# Patient Record
Sex: MALE | Race: BLACK OR AFRICAN AMERICAN | NOT HISPANIC OR LATINO | URBAN - METROPOLITAN AREA
[De-identification: names, ages, dates, MRNs, and addresses within clinical notes are randomized per-mention and may not be internally consistent; named-entity substitution may affect disease eponyms.]

---

## 2019-06-05 LAB — HBA1C MFR BLD HPLC: 5.1 %

## 2019-06-18 ENCOUNTER — TELEPHONE (OUTPATIENT)
Dept: NEPHROLOGY | Facility: CLINIC | Age: 39
End: 2019-06-18

## 2019-06-26 ENCOUNTER — CONSULT (OUTPATIENT)
Dept: NEPHROLOGY | Facility: CLINIC | Age: 39
End: 2019-06-26
Payer: COMMERCIAL

## 2019-06-26 VITALS
BODY MASS INDEX: 39.17 KG/M2 | HEART RATE: 74 BPM | DIASTOLIC BLOOD PRESSURE: 98 MMHG | WEIGHT: 315 LBS | HEIGHT: 75 IN | SYSTOLIC BLOOD PRESSURE: 136 MMHG

## 2019-06-26 DIAGNOSIS — I10 HYPERTENSION, UNSPECIFIED TYPE: ICD-10-CM

## 2019-06-26 DIAGNOSIS — I10 ESSENTIAL HYPERTENSION: ICD-10-CM

## 2019-06-26 DIAGNOSIS — R80.1 PERSISTENT PROTEINURIA: ICD-10-CM

## 2019-06-26 DIAGNOSIS — N18.30 STAGE 3 CHRONIC KIDNEY DISEASE (HCC): ICD-10-CM

## 2019-06-26 DIAGNOSIS — N17.9 AKI (ACUTE KIDNEY INJURY) (HCC): Primary | ICD-10-CM

## 2019-06-26 LAB
SL AMB  POCT GLUCOSE, UA: NEGATIVE
SL AMB LEUKOCYTE ESTERASE,UA: NEGATIVE
SL AMB POCT BILIRUBIN,UA: NEGATIVE
SL AMB POCT BLOOD,UA: NEGATIVE
SL AMB POCT CLARITY,UA: CLEAR
SL AMB POCT COLOR,UA: YELLOW
SL AMB POCT KETONES,UA: NEGATIVE
SL AMB POCT NITRITE,UA: NEGATIVE
SL AMB POCT PH,UA: 6
SL AMB POCT SPECIFIC GRAVITY,UA: 1.01
SL AMB POCT URINE PROTEIN: ABNORMAL
SL AMB POCT UROBILINOGEN: 0.2

## 2019-06-26 PROCEDURE — 99244 OFF/OP CNSLTJ NEW/EST MOD 40: CPT | Performed by: INTERNAL MEDICINE

## 2019-06-26 PROCEDURE — 81002 URINALYSIS NONAUTO W/O SCOPE: CPT | Performed by: INTERNAL MEDICINE

## 2019-06-26 RX ORDER — CHLORTHALIDONE 25 MG/1
12.5 TABLET ORAL DAILY
Qty: 30 TABLET | Refills: 5 | Status: SHIPPED | OUTPATIENT
Start: 2019-06-26 | End: 2021-06-22 | Stop reason: ALTCHOICE

## 2019-06-26 RX ORDER — AMLODIPINE BESYLATE 10 MG/1
10 TABLET ORAL DAILY
Refills: 3 | COMMUNITY
Start: 2019-06-04 | End: 2021-06-22 | Stop reason: SDUPTHER

## 2019-07-13 LAB
ALBUMIN SERPL-MCNC: 4.9 G/DL (ref 3.6–5.1)
ALBUMIN/GLOB SERPL: 1.6 (CALC) (ref 1–2.5)
ALP SERPL-CCNC: 83 U/L (ref 40–115)
ALT SERPL-CCNC: 31 U/L (ref 9–46)
APPEARANCE UR: CLEAR
AST SERPL-CCNC: 50 U/L (ref 10–40)
BACTERIA UR QL AUTO: ABNORMAL /HPF
BASOPHILS # BLD AUTO: 30 CELLS/UL (ref 0–200)
BASOPHILS NFR BLD AUTO: 0.4 %
BILIRUB SERPL-MCNC: 0.6 MG/DL (ref 0.2–1.2)
BILIRUB UR QL STRIP: NEGATIVE
BUN SERPL-MCNC: 16 MG/DL (ref 7–25)
BUN/CREAT SERPL: 11 (CALC) (ref 6–22)
CALCIUM SERPL-MCNC: 9.7 MG/DL (ref 8.6–10.3)
CHLORIDE SERPL-SCNC: 103 MMOL/L (ref 98–110)
CO2 SERPL-SCNC: 25 MMOL/L (ref 20–32)
COLOR UR: ABNORMAL
CREAT SERPL-MCNC: 1.43 MG/DL (ref 0.6–1.35)
EOSINOPHIL # BLD AUTO: 60 CELLS/UL (ref 15–500)
EOSINOPHIL NFR BLD AUTO: 0.8 %
ERYTHROCYTE [DISTWIDTH] IN BLOOD BY AUTOMATED COUNT: 11.7 % (ref 11–15)
GLOBULIN SER CALC-MCNC: 3.1 G/DL (CALC) (ref 1.9–3.7)
GLUCOSE SERPL-MCNC: 79 MG/DL (ref 65–99)
GLUCOSE UR QL STRIP: NEGATIVE
HCT VFR BLD AUTO: 40.1 % (ref 38.5–50)
HGB BLD-MCNC: 13.5 G/DL (ref 13.2–17.1)
HGB UR QL STRIP: NEGATIVE
HYALINE CASTS #/AREA URNS LPF: ABNORMAL /LPF
KETONES UR QL STRIP: ABNORMAL
LEUKOCYTE ESTERASE UR QL STRIP: NEGATIVE
LYMPHOCYTES # BLD AUTO: 1763 CELLS/UL (ref 850–3900)
LYMPHOCYTES NFR BLD AUTO: 23.5 %
MAGNESIUM SERPL-MCNC: 2.2 MG/DL (ref 1.5–2.5)
MCH RBC QN AUTO: 31.9 PG (ref 27–33)
MCHC RBC AUTO-ENTMCNC: 33.7 G/DL (ref 32–36)
MCV RBC AUTO: 94.8 FL (ref 80–100)
MONOCYTES # BLD AUTO: 578 CELLS/UL (ref 200–950)
MONOCYTES NFR BLD AUTO: 7.7 %
NEUTROPHILS # BLD AUTO: 5070 CELLS/UL (ref 1500–7800)
NEUTROPHILS NFR BLD AUTO: 67.6 %
NITRITE UR QL STRIP: NEGATIVE
PH UR STRIP: 6 [PH] (ref 5–8)
PHOSPHATE SERPL-MCNC: 3.3 MG/DL (ref 2.5–4.5)
PLATELET # BLD AUTO: 219 THOUSAND/UL (ref 140–400)
PMV BLD REES-ECKER: 11.4 FL (ref 7.5–12.5)
POTASSIUM SERPL-SCNC: 3.6 MMOL/L (ref 3.5–5.3)
PROT SERPL-MCNC: 8 G/DL (ref 6.1–8.1)
PROT UR QL STRIP: ABNORMAL
RBC # BLD AUTO: 4.23 MILLION/UL (ref 4.2–5.8)
RBC #/AREA URNS HPF: ABNORMAL /HPF
SERVICE CMNT-IMP: ABNORMAL
SL AMB EGFR AFRICAN AMERICAN: 71 ML/MIN/1.73M2
SL AMB EGFR NON AFRICAN AMERICAN: 62 ML/MIN/1.73M2
SODIUM SERPL-SCNC: 138 MMOL/L (ref 135–146)
SP GR UR STRIP: 1.02 (ref 1–1.03)
SQUAMOUS #/AREA URNS HPF: ABNORMAL /HPF
WBC # BLD AUTO: 7.5 THOUSAND/UL (ref 3.8–10.8)
WBC #/AREA URNS HPF: ABNORMAL /HPF

## 2019-07-15 ENCOUNTER — TELEPHONE (OUTPATIENT)
Dept: NEPHROLOGY | Facility: CLINIC | Age: 39
End: 2019-07-15

## 2019-07-15 NOTE — TELEPHONE ENCOUNTER
1st attempted to contact patient on 7/12  Left message for patient to schedule September follow up appointment with Dr Basilio Paz

## 2019-07-16 ENCOUNTER — TELEPHONE (OUTPATIENT)
Dept: NEPHROLOGY | Facility: CLINIC | Age: 39
End: 2019-07-16

## 2019-07-17 LAB
ALBUMIN/CREAT UR: 7 MCG/MG CREAT
ALDOST SERPL-MCNC: 6 NG/DL
CATECHOLS PLAS-MCNC: 691 PG/ML
CREAT UR-MCNC: 360 MG/DL (ref 20–320)
DOPAMINE 24H UR-MRATE: NORMAL PG/ML
EPINEPH PLAS-MCNC: 62 PG/ML
METANEPH FREE SERPL-MCNC: 49 PG/ML
METANEPHS SERPL-MCNC: 154 PG/ML
MICROALBUMIN UR-MCNC: 2.4 MG/DL
NOREPINEPH PLAS-MCNC: 629 PG/ML
NORMETANEPH FREE SERPL-MCNC: 105 PG/ML
RENIN PLAS-CCNC: 1.12 NG/ML/H (ref 0.25–5.82)

## 2021-06-13 ENCOUNTER — HOSPITAL ENCOUNTER (EMERGENCY)
Facility: HOSPITAL | Age: 41
Discharge: HOME | End: 2021-06-13
Attending: EMERGENCY MEDICINE | Admitting: EMERGENCY MEDICINE
Payer: COMMERCIAL

## 2021-06-13 VITALS
OXYGEN SATURATION: 100 % | TEMPERATURE: 98.1 F | HEART RATE: 111 BPM | HEIGHT: 75 IN | DIASTOLIC BLOOD PRESSURE: 113 MMHG | SYSTOLIC BLOOD PRESSURE: 174 MMHG | BODY MASS INDEX: 39.17 KG/M2 | RESPIRATION RATE: 20 BRPM | WEIGHT: 315 LBS

## 2021-06-13 DIAGNOSIS — L02.31 LEFT BUTTOCK ABSCESS: Primary | ICD-10-CM

## 2021-06-13 PROCEDURE — 99283 EMERGENCY DEPT VISIT LOW MDM: CPT

## 2021-06-13 PROCEDURE — 10061 I&D ABSCESS COMP/MULTIPLE: CPT | Performed by: PHYSICIAN ASSISTANT

## 2021-06-13 PROCEDURE — 99284 EMERGENCY DEPT VISIT MOD MDM: CPT | Performed by: PHYSICIAN ASSISTANT

## 2021-06-13 RX ORDER — BACITRACIN, NEOMYCIN, POLYMYXIN B 400; 3.5; 5 [USP'U]/G; MG/G; [USP'U]/G
1 OINTMENT TOPICAL ONCE
Status: COMPLETED | OUTPATIENT
Start: 2021-06-13 | End: 2021-06-13

## 2021-06-13 RX ORDER — LIDOCAINE HYDROCHLORIDE AND EPINEPHRINE 10; 10 MG/ML; UG/ML
10 INJECTION, SOLUTION INFILTRATION; PERINEURAL ONCE
Status: COMPLETED | OUTPATIENT
Start: 2021-06-13 | End: 2021-06-13

## 2021-06-13 RX ORDER — CLINDAMYCIN HYDROCHLORIDE 300 MG/1
300 CAPSULE ORAL EVERY 8 HOURS SCHEDULED
Qty: 21 CAPSULE | Refills: 0 | Status: SHIPPED | OUTPATIENT
Start: 2021-06-13 | End: 2021-06-20

## 2021-06-13 RX ORDER — OXYCODONE HYDROCHLORIDE AND ACETAMINOPHEN 5; 325 MG/1; MG/1
1 TABLET ORAL EVERY 6 HOURS PRN
Qty: 12 TABLET | Refills: 0 | Status: SHIPPED | OUTPATIENT
Start: 2021-06-13 | End: 2021-06-16

## 2021-06-13 RX ORDER — IBUPROFEN 600 MG/1
600 TABLET ORAL EVERY 6 HOURS PRN
Qty: 20 TABLET | Refills: 0 | Status: SHIPPED | OUTPATIENT
Start: 2021-06-13 | End: 2021-06-22 | Stop reason: ALTCHOICE

## 2021-06-13 RX ADMIN — LIDOCAINE HYDROCHLORIDE,EPINEPHRINE BITARTRATE 10 ML: 10; .01 INJECTION, SOLUTION INFILTRATION; PERINEURAL at 11:27

## 2021-06-13 RX ADMIN — BACITRACIN, NEOMYCIN, POLYMYXIN B 1 SMALL APPLICATION: 400; 3.5; 5 OINTMENT TOPICAL at 11:27

## 2021-06-13 NOTE — ED PROVIDER NOTES
History  Chief Complaint   Patient presents with    Abscess     "anal abscess" x 4 days, given hemorrhoid cream from urgent care, denies fevers, history of same 10 years ago  Patient with PMH: HTN , PSH: back surgery, knee surgery, shoulder arthroplasty, prior I&D left buttocks, presents to ED c/o 4 day history of gradual onset of painful enlarging mass to left perianal, buttock area  No fevers ,no NVD, no discharge, no erythema ; patient denies trying to open area or squeeze area at home  Prior to Admission Medications   Prescriptions Last Dose Informant Patient Reported? Taking? amLODIPine (NORVASC) 10 mg tablet 6/13/2021 at Unknown time  Yes Yes   Sig: Take 10 mg by mouth daily   chlorthalidone 25 mg tablet 6/13/2021 at Unknown time  No Yes   Sig: Take 0 5 tablets (12 5 mg total) by mouth daily   Patient taking differently: Take 25 mg by mouth daily       Facility-Administered Medications: None       Past Medical History:   Diagnosis Date    Hypertension        Past Surgical History:   Procedure Laterality Date    BACK SURGERY      FINGER SURGERY Right     KNEE SURGERY Right     SHOULDER ARTHROPLASTY Right        Family History   Problem Relation Age of Onset    Heart disease Mother     Hypertension Mother     No Known Problems Father      I have reviewed and agree with the history as documented  E-Cigarette/Vaping    E-Cigarette Use Never User      E-Cigarette/Vaping Substances     Social History     Tobacco Use    Smoking status: Current Every Day Smoker     Packs/day: 0 50     Types: Cigarettes    Smokeless tobacco: Never Used   Vaping Use    Vaping Use: Never used   Substance Use Topics    Alcohol use: Yes    Drug use: Yes     Types: Marijuana       Review of Systems   Constitutional: Negative for chills and fever  HENT: Negative for hearing loss and sore throat  Eyes: Negative for visual disturbance  Respiratory: Negative for shortness of breath      Cardiovascular: Negative for chest pain  Gastrointestinal: Negative for abdominal pain, nausea and vomiting  Genitourinary: Negative for dysuria, frequency and genital sores  Musculoskeletal: Positive for gait problem and myalgias  Negative for arthralgias  Skin: Negative for color change and pallor  Neurological: Negative for dizziness, weakness, light-headedness and headaches  Psychiatric/Behavioral: Negative for behavioral problems  All other systems reviewed and are negative  Physical Exam  Physical Exam  Vitals and nursing note reviewed  Constitutional:       General: He is in acute distress  Appearance: He is well-developed  He is obese  HENT:      Head: Normocephalic and atraumatic  Right Ear: External ear normal       Left Ear: External ear normal       Nose: Nose normal       Mouth/Throat:      Mouth: Mucous membranes are moist       Pharynx: Oropharynx is clear  Eyes:      Conjunctiva/sclera: Conjunctivae normal    Cardiovascular:      Rate and Rhythm: Normal rate  Pulmonary:      Effort: Pulmonary effort is normal  No respiratory distress  Abdominal:      General: Bowel sounds are normal       Palpations: Abdomen is soft  Genitourinary:         Comments: + raised, tender, fluctuant mass/abscess noted to left, upper, inner buttock, not involving the anus/rectum/perineum  Musculoskeletal:         General: Normal range of motion  Cervical back: Normal range of motion  Lymphadenopathy:      Cervical: No cervical adenopathy  Skin:     General: Skin is warm and dry  Capillary Refill: Capillary refill takes less than 2 seconds  Findings: Lesion present  Neurological:      General: No focal deficit present  Mental Status: He is alert     Psychiatric:         Behavior: Behavior normal          Vital Signs  ED Triage Vitals [06/13/21 1100]   Temperature Pulse Respirations Blood Pressure SpO2   98 1 °F (36 7 °C) (!) 111 20 (!) 174/113 100 %      Temp Source Heart Rate Source Patient Position - Orthostatic VS BP Location FiO2 (%)   Oral Monitor Sitting Left arm --      Pain Score       Worst Possible Pain           Vitals:    06/13/21 1100   BP: (!) 174/113   Pulse: (!) 111   Patient Position - Orthostatic VS: Sitting         Visual Acuity      ED Medications  Medications   lidocaine-epinephrine (XYLOCAINE/EPINEPHRINE) 1 %-1:100,000 injection 10 mL (10 mL Infiltration Given 6/13/21 1127)   neomycin-bacitracin-polymyxin b (NEOSPORIN) ointment 1 small application (1 small application Topical Given 6/13/21 1127)       Diagnostic Studies  Results Reviewed     None                 No orders to display              Procedures  Incision and drain    Date/Time: 6/13/2021 12:11 PM  Performed by: Cruz Verde PA-C  Authorized by: Cruz Verde PA-C   Universal Protocol:  Consent: Verbal consent obtained  Risks and benefits: risks, benefits and alternatives were discussed  Consent given by: patient  Time out: Immediately prior to procedure a "time out" was called to verify the correct patient, procedure, equipment, support staff and site/side marked as required  Patient understanding: patient states understanding of the procedure being performed  Patient identity confirmed: verbally with patient      Patient location:  ED  Location:     Type:  Abscess    Size:  3    Location: left buttock  Pre-procedure details:     Skin preparation:  Betadine  Anesthesia (see MAR for exact dosages): Anesthesia method:  Local infiltration    Local anesthetic:  Lidocaine 1% WITH epi  Procedure details:     Complexity:  Simple    Incision types:  Single straight    Scalpel blade:  11    Approach:  Open    Incision depth:  Subcutaneous    Wound management:  Probed and deloculated and irrigated with saline    Drainage:  Purulent and bloody    Drainage amount:   Moderate    Wound treatment:  Drain placed    Packing materials:  1/2 in iodoform gauze  Post-procedure details:     Patient tolerance of procedure: Tolerated well, no immediate complications             ED Course                                           MDM    Disposition  Final diagnoses:   Left buttock abscess     Time reflects when diagnosis was documented in both MDM as applicable and the Disposition within this note     Time User Action Codes Description Comment    6/13/2021 11:56 AM Rip Booth [L02 31] Left buttock abscess       ED Disposition     ED Disposition Condition Date/Time Comment    Discharge [place] Jun 13, 2021 11:55 AM Joesph Sacks discharge to home/self care  Follow-up Information     Follow up With Specialties Details Why Contact Info Additional [id number]  Emergency Department Emergency Medicine  For wound re-check/packing removal 24-48 hrs [address] [facility],[address] [facility] Emergency Department, [address], [address]          Discharge Medication List as of 6/13/2021 11:58 AM      START taking these medications    Details   clindamycin (CLEOCIN) 300 MG capsule Take 1 capsule (300 mg total) by mouth every 8 (eight) hours for 7 days, Starting Sun 6/13/2021, Until Sun 6/20/2021, Normal      ibuprofen (MOTRIN) 600 mg tablet Take 1 tablet (600 mg total) by mouth every 6 (six) hours as needed for mild pain, Starting Sun 6/13/2021, Normal      oxyCODONE-acetaminophen (PERCOCET) 5-325 mg per tablet Take 1 tablet by mouth every 6 (six) hours as needed for moderate pain for up to 3 daysMax Daily Amount: 4 tablets, Starting Sun 6/13/2021, Until Wed 6/16/2021 at 2359, Normal         CONTINUE these medications which have NOT CHANGED    Details   amLODIPine (NORVASC) 10 mg tablet Take 10 mg by mouth daily, Starting Tue 6/4/2019, Historical Med      chlorthalidone 25 mg tablet Take 0 5 tablets (12 5 mg total) by mouth daily, Starting Wed 6/26/2019, Normal           No discharge procedures on file      PDMP Review None          ED Provider  Electronically Signed by           Jimmy Horan PA-C  06/13/21 6194

## 2021-06-13 NOTE — DISCHARGE INSTRUCTIONS
Use Motrin every 6 hours for pain, if no improvement add percocet  Take all oral antibiotics until done

## 2021-06-16 ENCOUNTER — RA CDI HCC (OUTPATIENT)
Dept: OTHER | Facility: HOSPITAL | Age: 41
End: 2021-06-16

## 2021-06-16 NOTE — PROGRESS NOTES
[facility]  coding opportunities             Chart reviewed, (number of) suggestions sent to provider: 2            Number of suggestions actually used: 2         Patients insurance company: [employer] (Medicare Advantage and Commercial)     Visit status: Patient arrived for their scheduled appointment        [facility]  coding opportunities             Chart reviewed, (number of) suggestions sent to provider: 2                  Patients insurance company: [employer] (Medicare Advantage and Commercial)           1) N18 3: Stage 3 chronic kidney disease ([facility] ) - last assessed on 6/26/2019 Nephrology  2) E66 01: Morbid (severe) obesity due to excess calories ([facility] ) - please review if this is correct and assess and document if applicable

## 2021-06-22 ENCOUNTER — OFFICE VISIT (OUTPATIENT)
Dept: FAMILY MEDICINE CLINIC | Facility: CLINIC | Age: 41
End: 2021-06-22
Payer: COMMERCIAL

## 2021-06-22 ENCOUNTER — TELEPHONE (OUTPATIENT)
Dept: PSYCHIATRY | Facility: CLINIC | Age: 41
End: 2021-06-22

## 2021-06-22 VITALS
DIASTOLIC BLOOD PRESSURE: 100 MMHG | HEART RATE: 102 BPM | BODY MASS INDEX: 39.17 KG/M2 | WEIGHT: 315 LBS | HEIGHT: 75 IN | SYSTOLIC BLOOD PRESSURE: 168 MMHG | OXYGEN SATURATION: 96 % | RESPIRATION RATE: 16 BRPM

## 2021-06-22 DIAGNOSIS — F17.200 TOBACCO DEPENDENCE: ICD-10-CM

## 2021-06-22 DIAGNOSIS — F10.10 ALCOHOL ABUSE: ICD-10-CM

## 2021-06-22 DIAGNOSIS — Z00.00 ANNUAL PHYSICAL EXAM: Primary | ICD-10-CM

## 2021-06-22 DIAGNOSIS — R94.31 ABNORMAL EKG: ICD-10-CM

## 2021-06-22 DIAGNOSIS — F12.90 MARIJUANA USE: ICD-10-CM

## 2021-06-22 DIAGNOSIS — I10 ESSENTIAL HYPERTENSION: ICD-10-CM

## 2021-06-22 DIAGNOSIS — E66.01 MORBID OBESITY WITH BMI OF 40.0-44.9, ADULT (HCC): ICD-10-CM

## 2021-06-22 DIAGNOSIS — R00.0 TACHYCARDIA: ICD-10-CM

## 2021-06-22 DIAGNOSIS — N18.30 STAGE 3 CHRONIC KIDNEY DISEASE, UNSPECIFIED WHETHER STAGE 3A OR 3B CKD (HCC): ICD-10-CM

## 2021-06-22 DIAGNOSIS — Z13.1 SCREENING FOR DIABETES MELLITUS: ICD-10-CM

## 2021-06-22 DIAGNOSIS — Z13.6 SCREENING FOR CARDIOVASCULAR CONDITION: ICD-10-CM

## 2021-06-22 PROBLEM — N17.9 AKI (ACUTE KIDNEY INJURY) (HCC): Status: RESOLVED | Noted: 2019-06-26 | Resolved: 2021-06-22

## 2021-06-22 PROCEDURE — 99386 PREV VISIT NEW AGE 40-64: CPT | Performed by: FAMILY MEDICINE

## 2021-06-22 PROCEDURE — 4004F PT TOBACCO SCREEN RCVD TLK: CPT | Performed by: FAMILY MEDICINE

## 2021-06-22 PROCEDURE — 93000 ELECTROCARDIOGRAM COMPLETE: CPT | Performed by: FAMILY MEDICINE

## 2021-06-22 PROCEDURE — 99203 OFFICE O/P NEW LOW 30 MIN: CPT | Performed by: FAMILY MEDICINE

## 2021-06-22 PROCEDURE — 3725F SCREEN DEPRESSION PERFORMED: CPT | Performed by: FAMILY MEDICINE

## 2021-06-22 PROCEDURE — 3008F BODY MASS INDEX DOCD: CPT | Performed by: FAMILY MEDICINE

## 2021-06-22 RX ORDER — HYDROCHLOROTHIAZIDE 25 MG/1
TABLET ORAL
COMMUNITY
Start: 2021-05-22 | End: 2022-01-18 | Stop reason: SDUPTHER

## 2021-06-22 RX ORDER — METOPROLOL SUCCINATE 25 MG/1
25 TABLET, EXTENDED RELEASE ORAL DAILY
Qty: 90 TABLET | Refills: 0 | Status: SHIPPED | OUTPATIENT
Start: 2021-06-22 | End: 2021-09-16

## 2021-06-22 RX ORDER — AMLODIPINE BESYLATE 10 MG/1
10 TABLET ORAL DAILY
Qty: 90 TABLET | Refills: 1 | Status: SHIPPED | OUTPATIENT
Start: 2021-06-22 | End: 2022-01-18 | Stop reason: SDUPTHER

## 2021-06-22 NOTE — PROGRESS NOTES
[facility]    NAME: Zen Johnson  AGE: 36 y o  SEX: male  : 1980     DATE: 2021     Assessment and Plan:     Problem List Items Addressed This Visit        Cardiovascular and Mediastinum    Essential hypertension    Relevant Medications    hydrochlorothiazide (HYDRODIURIL) 25 mg tablet    amLODIPine (NORVASC) 10 mg tablet    metoprolol succinate (TOPROL-XL) 25 mg 24 hr tablet    Other Relevant Orders    CBC and Platelet    Microalbumin / creatinine urine ratio    Comprehensive metabolic panel    TSH, 3rd generation with Free T4 reflex    POCT ECG    Ambulatory referral to Cardiology       Genitourinary    Stage 3 chronic kidney disease (HCC)    Relevant Medications    hydrochlorothiazide (HYDRODIURIL) 25 mg tablet    Other Relevant Orders    Microalbumin / creatinine urine ratio    Ambulatory referral to Nephrology       Other    Screening for cardiovascular condition - Primary    Relevant Orders    Lipid panel    Microalbumin / creatinine urine ratio    Morbid obesity with BMI of 40 0-44 9, adult ([address] )    Relevant Orders    Ambulatory referral to Weight Management    Ambulatory referral to Bariatric Surgery    Tobacco dependence    Relevant Orders    Ambulatory referral to [facility]    Alcohol abuse    Relevant Orders    Ambulatory referral to Behavioral Health    Tachycardia    Relevant Orders    POCT ECG    Marijuana use    Abnormal EKG    Relevant Orders    Ambulatory referral to Cardiology       EKG showed nonspecific T-wave abnormality and normal sinus rhythm, he has uncontrolled hypertension will add beta-blocker and advised to see cardiologist for for the evaluation for his heart     he  Refused for tetanus vaccine    Immunizations and preventive care screenings were discussed with patient today  Appropriate education was printed on patient's after visit summary      Counseling:  Alcohol/drug use: discussed moderation in alcohol intake, the recommendations for healthy alcohol use, and avoidance of illicit drug use  Dental Health: discussed importance of regular tooth brushing, flossing, and dental visits  · Exercise: the importance of regular exercise/physical activity was discussed  Recommend exercise 3-5 times per week for at least 30 minutes  BMI Counseling: Body mass index is 41 87 kg/m²  The BMI is above normal  Nutrition recommendations include decreasing portion sizes, decreasing fast food intake, consuming healthier snacks, limiting drinks that contain sugar, moderation in carbohydrate intake and reducing intake of cholesterol  Exercise recommendations include vigorous physical activity 75 minutes/week and exercising 3-5 times per week  Tobacco Cessation Counseling: Tobacco cessation counseling was provided  The patient is sincerely urged to quit consumption of tobacco  He is not ready to quit tobacco  Medication options discussed  No follow-ups on file  Chief Complaint:     Chief Complaint   Patient presents with   [address] Annual Exam      History of Present Illness:     Adult Annual Physical   Patient here for a comprehensive physical exam  The patient reports problems -   [place] Simple He is a new patient, he smokes quarter pack per day cigarettes and his smokes marijuana, and he also use alcohol about half pack per day, and is willing to work on it,  He has chronic kidney disease and he has not seen the nephrologist in 2 years, he says he only takes hydrochlorothiazide and amlodipine and his blood pressure is high, he denies any palpitation or chest pain, he has no sleep problem  He is interested in weight management program   he had recent incision drainage and abscess drainage from his left buttock, he finished the clindamycin and he feels fine    Diet and Physical Activity  · Diet/Nutrition: poor diet  · Exercise: no formal exercise        Depression Screening  PHQ-9 Depression Screening    PHQ-9:   Frequency of the following problems over the past two weeks:      Little interest or pleasure in doing things: 0 - not at all  Feeling down, depressed, or hopeless: 0 - not at all  PHQ-2 Score: 0       General Health  · Sleep: sleeps well  · Hearing: normal - bilateral   · Vision: wears glasses  · Dental: regular dental visits   Health  · Symptoms include: none     Review of Systems:     Review of Systems   Constitutional: Negative for activity change, appetite change, chills, fatigue, fever and unexpected weight change  HENT: Negative for congestion, ear discharge, ear pain, nosebleeds, postnasal drip, rhinorrhea, sinus pressure, sneezing, sore throat, trouble swallowing and voice change  Eyes: Negative for photophobia, pain, discharge, redness and itching  Respiratory: Negative for cough, chest tightness, shortness of breath and wheezing  Cardiovascular: Negative for chest pain, palpitations and leg swelling  Gastrointestinal: Negative for abdominal pain, constipation, diarrhea, nausea and vomiting  Endocrine: Negative for polyuria  Genitourinary: Positive for frequency  Negative for dysuria and urgency  Musculoskeletal: Negative for arthralgias, back pain, myalgias and neck pain  Skin: Negative for color change, pallor and rash  Allergic/Immunologic: Negative for environmental allergies and food allergies  Neurological: Negative for dizziness, weakness, light-headedness and headaches  Hematological: Negative for adenopathy  Does not bruise/bleed easily  Psychiatric/Behavioral: Negative for behavioral problems and dysphoric mood  The patient is not nervous/anxious         Past Medical History:     Past Medical History:   Diagnosis Date    Hypertension       Past Surgical History:     Past Surgical History:   Procedure Laterality Date    BACK SURGERY      FINGER SURGERY Right     KNEE SURGERY Right     SHOULDER ARTHROPLASTY Right Family History:     Family History   Problem Relation Age of Onset    Heart disease Mother     Hypertension Mother     No Known Problems Father       Social History:     Social History     Socioeconomic History    Marital status: Single     Spouse name: None    Number of children: None    Years of education: None    Highest education level: None   Occupational History    None   Tobacco Use    Smoking status: Current Every Day Smoker     Packs/day: 0 25     Types: Cigarettes    Smokeless tobacco: Never Used   Vaping Use    Vaping Use: Never used   Substance and Sexual Activity    Alcohol use: Yes    Drug use: Yes     Types: Marijuana    Sexual activity: None   Other Topics Concern    None   Social History Narrative    None     Social Determinants of Health     Financial Resource Strain:     Difficulty of Paying Living Expenses:    Food Insecurity:     Worried About Running Out of Food in the Last Year:     [address] in the Last Year:    Transportation Needs:     Lack of Transportation (Medical):      Lack of Transportation (Non-Medical):    Physical Activity:     Days of Exercise per Week:     Minutes of Exercise per Session:    Stress:     Feeling of Stress :    Social Connections:     Frequency of Communication with Friends and Family:     Frequency of Social Gatherings with Friends and Family:     Attends Baptism Services:     Active Member of Clubs or Organizations:     Attends Club or Organization Meetings:     Marital Status:    Intimate Partner Violence:     Fear of Current or Ex-Partner:     Emotionally Abused:     Physically Abused:     Sexually Abused:       Current Medications:     Current Outpatient Medications   Medication Sig Dispense Refill    amLODIPine (NORVASC) 10 mg tablet Take 1 tablet (10 mg total) by mouth daily 90 tablet 1    hydrochlorothiazide (HYDRODIURIL) 25 mg tablet TAKE 1 TABLET BY MOUTH EVERY MORNING OR AS DIRECTED      metoprolol succinate (TOPROL-XL) 25 mg 24 hr tablet Take 1 tablet (25 mg total) by mouth daily 90 tablet 0     No current facility-administered medications for this visit  Allergies:     No Known Allergies   Physical Exam:     /100   Pulse 102   Resp 16   Ht 6' 3" (1 905 m)   Wt (!) 152 kg (335 lb)   SpO2 96%   BMI 41 87 kg/m²     Physical Exam  Vitals and nursing note reviewed  Constitutional:       General: He is not in acute distress  Appearance: He is obese  HENT:      Head: Normocephalic and atraumatic  Right Ear: There is impacted cerumen  Left Ear: Tympanic membrane and ear canal normal    Eyes:      Extraocular Movements: Extraocular movements intact  Conjunctiva/sclera: Conjunctivae normal       Pupils: Pupils are equal, round, and reactive to light  Cardiovascular:      Rate and Rhythm: Normal rate and regular rhythm  Heart sounds: No murmur heard  Pulmonary:      Effort: Pulmonary effort is normal       Breath sounds: Normal breath sounds  No wheezing or rales  Abdominal:      General: Abdomen is flat  Bowel sounds are normal  There is no distension  Palpations: Abdomen is soft  There is no mass  Tenderness: There is no abdominal tenderness  There is no guarding  Musculoskeletal:         General: No swelling, tenderness, deformity or signs of injury  Normal range of motion  Cervical back: Normal range of motion and neck supple  Skin:     Coloration: Skin is not jaundiced or pale  Findings: No rash  Neurological:      General: No focal deficit present  Mental Status: He is alert and oriented to person, place, and time  Motor: No weakness  Psychiatric:         Mood and Affect: Mood normal          Behavior: Behavior normal          Thought Content:  Thought content normal          Judgment: Judgment normal           KekeMD Jez Mejias 41

## 2021-06-22 NOTE — PROGRESS NOTES
.posterior vertex pilar cyst ruptured recently and in past.  Is settling down, but too fluctuant for removal.  Will reassess in 5 months to consider removal, or may prefer to just leave it.   Assessment/Plan:    Problem List Items Addressed This Visit        Cardiovascular and Mediastinum    Essential hypertension    Relevant Medications    hydrochlorothiazide (HYDRODIURIL) 25 mg tablet    amLODIPine (NORVASC) 10 mg tablet    metoprolol succinate (TOPROL-XL) 25 mg 24 hr tablet    Other Relevant Orders    CBC and Platelet    Microalbumin / creatinine urine ratio    Comprehensive metabolic panel    TSH, 3rd generation with Free T4 reflex    POCT ECG (Completed)    Ambulatory referral to Cardiology       Genitourinary    Stage 3 chronic kidney disease (HCC)    Relevant Medications    hydrochlorothiazide (HYDRODIURIL) 25 mg tablet    Other Relevant Orders    Microalbumin / creatinine urine ratio    Ambulatory referral to Nephrology       Other    Screening for cardiovascular condition - Primary    Relevant Orders    Lipid panel    Microalbumin / creatinine urine ratio    Morbid obesity with BMI of 40 0-44 9, adult ([facility] [address] )    Relevant Orders    Ambulatory referral to Weight Management    Ambulatory referral to Bariatric Surgery    Tobacco dependence    Relevant Orders    Ambulatory referral to [address] [place] [address]    Alcohol abuse    Relevant Orders    Ambulatory referral to Behavioral Health    Tachycardia    Relevant Orders    POCT ECG (Completed)    Marijuana use    Abnormal EKG    Relevant Orders    Ambulatory referral to Cardiology         after the labs he will follow-up again, he has EKG shows nonspecific T-wave abnormality, advised to see the cardiologist   referral made for be he will therapist to control on alcohol use and marijuana use,   And he will see the weight management program  Chief Complaint   Patient presents with   [facility]     Annual Exam       Subjective:   Patient ID: Abeba Dean is a 36 y o  male   Is a new patient, he has chronic kidney disease he has not seen the nephrologist in 2 years, but is currently taking amlodipine and hydrochlorothiazide, he says he is not taking chlorthalidone  his blood pressure remains uncontrolled, he has history of elevated creatinine in the past, he says he has no new insurance and came here for further treatment, he is a smoker quarter pack per day now and he drinks alcohol and marijuana and needs some help    his baseline creatinine is 1 4, is due for labs      Review of Systems   Constitutional: Negative for activity change, appetite change, chills, fatigue, fever and unexpected weight change  HENT: Negative for congestion, ear discharge, ear pain, nosebleeds, postnasal drip, rhinorrhea, sinus pressure, sneezing, sore throat, trouble swallowing and voice change  Eyes: Negative for photophobia, pain, discharge, redness and itching  Respiratory: Negative for cough, chest tightness, shortness of breath and wheezing  Cardiovascular: Negative for chest pain, palpitations and leg swelling  Gastrointestinal: Negative for abdominal pain, constipation, diarrhea, nausea and vomiting  Endocrine: Negative for polyuria  Genitourinary: Positive for frequency  Negative for dysuria and urgency  Musculoskeletal: Negative for arthralgias, back pain, myalgias and neck pain  Skin: Negative for color change, pallor and rash  Allergic/Immunologic: Negative for environmental allergies and food allergies  Neurological: Negative for dizziness, weakness, light-headedness and headaches  Hematological: Negative for adenopathy  Does not bruise/bleed easily  Psychiatric/Behavioral: Negative for behavioral problems, decreased concentration, dysphoric mood and sleep disturbance  The patient is not nervous/anxious  Objective:  Physical Exam  Vitals and nursing note reviewed  Constitutional:       Appearance: He is well-developed  He is obese  HENT:      Head: Normocephalic and atraumatic  Right Ear: Tympanic membrane and ear canal normal       Left Ear: Tympanic membrane and ear canal normal    Eyes:      General: No scleral icterus  Extraocular Movements: Extraocular movements intact  Conjunctiva/sclera: Conjunctivae normal    Neck:      Thyroid: No thyromegaly  Cardiovascular:      Rate and Rhythm: Normal rate and regular rhythm  Heart sounds: Normal heart sounds  Pulmonary:      Effort: Pulmonary effort is normal       Breath sounds: Normal breath sounds  No wheezing or rales  Musculoskeletal:         General: Normal range of motion  Cervical back: Normal range of motion and neck supple  Right lower leg: No edema  Left lower leg: No edema  Lymphadenopathy:      Cervical: No cervical adenopathy  Skin:     Coloration: Skin is not jaundiced  Findings: No erythema or rash  Neurological:      General: No focal deficit present  Mental Status: He is alert and oriented to person, place, and time        Gait: Gait normal    Psychiatric:         Mood and Affect: Mood normal             Past Surgical History:   Procedure Laterality Date    BACK SURGERY      FINGER SURGERY Right     KNEE SURGERY Right     SHOULDER ARTHROPLASTY Right        Family History   Problem Relation Age of Onset    Heart disease Mother     Hypertension Mother     No Known Problems Father          Current Outpatient Medications:     amLODIPine (NORVASC) 10 mg tablet, Take 1 tablet (10 mg total) by mouth daily, Disp: 90 tablet, Rfl: 1    hydrochlorothiazide (HYDRODIURIL) 25 mg tablet, TAKE 1 TABLET BY MOUTH EVERY MORNING OR AS DIRECTED, Disp: , Rfl:     metoprolol succinate (TOPROL-XL) 25 mg 24 hr tablet, Take 1 tablet (25 mg total) by mouth daily, Disp: 90 tablet, Rfl: 0    No Known Allergies    Vitals:    06/22/21 1256   BP: 168/100   Pulse: 102   Resp: 16   SpO2: 96%   Weight: (!) 152 kg (335 lb)   Height: 6' 3" (1 905 m)

## 2021-06-22 NOTE — PATIENT INSTRUCTIONS
Wellness Visit for Adults   AMBULATORY CARE:   A wellness visit  is when you see your healthcare provider to get screened for health problems  Your healthcare provider will also give you advice on how to stay healthy  Write down your questions so you remember to ask them  Ask your healthcare provider how often you should have a wellness visit  What happens at a wellness visit:  Your healthcare provider will ask about your health, and your family history of health problems  This includes high blood pressure, heart disease, and cancer  He or she will ask if you have symptoms that concern you, if you smoke, and about your mood  You may also be asked about your intake of medicines, supplements, food, and alcohol  Any of the following may be done:  · Your weight  will be checked  Your height may also be checked so your body mass index (BMI) can be calculated  Your BMI shows if you are at a healthy weight  · Your blood pressure  and heart rate will be checked  Your temperature may also be checked  · Blood and urine tests  may be done  Blood tests may be done to check your cholesterol levels  Abnormal cholesterol levels increase your risk for heart disease and stroke  You may also need a blood or urine test to check for diabetes if you are at increased risk  Urine tests may be done to look for signs of an infection or kidney disease  · A physical exam  includes checking your heartbeat and lungs with a stethoscope  Your healthcare provider may also check your skin to look for sun damage  · Screening tests  may be recommended  A screening test is done to check for diseases that may not cause symptoms  The screening tests you may need depend on your age, gender, family history, and lifestyle habits  For example, colorectal screening may be recommended if you are 48years old or older  Screening tests you need if you are a woman:   · A Pap smear  is used to screen for cervical cancer   Pap smears are usually done every 3 to 5 years depending on your age  You may need them more often if you have had abnormal Pap smear test results in the past  Ask your healthcare provider how often you should have a Pap smear  · A mammogram  is an x-ray of your breasts to screen for breast cancer  Experts recommend mammograms every 2 years starting at age 48 years  You may need a mammogram at age 52 years or younger if you have an increased risk for breast cancer  Talk to your healthcare provider about when you should start having mammograms and how often you need them  Vaccines you may need:   · Get an influenza vaccine  every year  The influenza vaccine protects you from the flu  Several types of viruses cause the flu  The viruses change over time, so new vaccines are made each year  · Get a tetanus-diphtheria (Td) booster vaccine  every 10 years  This vaccine protects you against tetanus and diphtheria  Tetanus is a severe infection that may cause painful muscle spasms and lockjaw  Diphtheria is a severe bacterial infection that causes a thick covering in the back of your mouth and throat  · Get a human papillomavirus (HPV) vaccine  if you are female and aged 23 to 32 or male 23 to 24 and never received it  This vaccine protects you from HPV infection  HPV is the most common infection spread by sexual contact  HPV may also cause vaginal, penile, and anal cancers  · Get a pneumococcal vaccine  if you are aged 72 years or older  The pneumococcal vaccine is an injection given to protect you from pneumococcal disease  Pneumococcal disease is an infection caused by pneumococcal bacteria  The infection may cause pneumonia, meningitis, or an ear infection  · Get a shingles vaccine  if you are 60 or older, even if you have had shingles before  The shingles vaccine is an injection to protect you from the varicella-zoster virus  This is the same virus that causes chickenpox   Shingles is a painful rash that develops in people who had chickenpox or have been exposed to the virus  How to eat healthy:  My Plate is a model for planning healthy meals  It shows the types and amounts of foods that should go on your plate  Fruits and vegetables make up about half of your plate, and grains and protein make up the other half  A serving of dairy is included on the side of your plate  The amount of calories and serving sizes you need depends on your age, gender, weight, and height  Examples of healthy foods are listed below:  · Eat a variety of vegetables  such as dark green, red, and orange vegetables  You can also include canned vegetables low in sodium (salt) and frozen vegetables without added butter or sauces  · Eat a variety of fresh fruits , canned fruit in 100% juice, frozen fruit, and dried fruit  · Include whole grains  At least half of the grains you eat should be whole grains  Examples include whole-wheat bread, wheat pasta, brown rice, and whole-grain cereals such as oatmeal     · Eat a variety of protein foods such as seafood (fish and shellfish), lean meat, and poultry without skin (turkey and chicken)  Examples of lean meats include pork leg, shoulder, or tenderloin, and beef round, sirloin, tenderloin, and extra lean ground beef  Other protein foods include eggs and egg substitutes, beans, peas, soy products, nuts, and seeds  · Choose low-fat dairy products such as skim or 1% milk or low-fat yogurt, cheese, and cottage cheese  · Limit unhealthy fats  such as butter, hard margarine, and shortening  Exercise:  Exercise at least 30 minutes per day on most days of the week  Some examples of exercise include walking, biking, dancing, and swimming  You can also fit in more physical activity by taking the stairs instead of the elevator or parking farther away from stores  Include muscle strengthening activities 2 days each week  Regular exercise provides many health benefits   It helps you manage your weight, and decreases your risk for type 2 diabetes, heart disease, stroke, and high blood pressure  Exercise can also help improve your mood  Ask your healthcare provider about the best exercise plan for you  General health and safety guidelines:   · Do not smoke  Nicotine and other chemicals in cigarettes and cigars can cause lung damage  Ask your healthcare provider for information if you currently smoke and need help to quit  E-cigarettes or smokeless tobacco still contain nicotine  Talk to your healthcare provider before you use these products  · Limit alcohol  A drink of alcohol is 12 ounces of beer, 5 ounces of wine, or 1½ ounces of liquor  · Lose weight, if needed  Being overweight increases your risk of certain health conditions  These include heart disease, high blood pressure, type 2 diabetes, and certain types of cancer  · Protect your skin  Do not sunbathe or use tanning beds  Use sunscreen with a SPF 15 or higher  Apply sunscreen at least 15 minutes before you go outside  Reapply sunscreen every 2 hours  Wear protective clothing, hats, and sunglasses when you are outside  · Drive safely  Always wear your seatbelt  Make sure everyone in your car wears a seatbelt  A seatbelt can save your life if you are in an accident  Do not use your cell phone when you are driving  This could distract you and cause an accident  Pull over if you need to make a call or send a text message  · Practice safe sex  Use latex condoms if are sexually active and have more than one partner  Your healthcare provider may recommend screening tests for sexually transmitted infections (STIs)  · Wear helmets, lifejackets, and protective gear  Always wear a helmet when you ride a bike or motorcycle, go skiing, or play sports that could cause a head injury  Wear protective equipment when you play sports  Wear a lifejacket when you are on a boat or doing water sports      © Copyright [employer] 2020 Information is for End User's use only and may not be sold, redistributed or otherwise used for commercial purposes  All illustrations and images included in CareNotes® are the copyrighted property of [employer] D A M [employer] Inc  or Yassine Stern  The above information is an  only  It is not intended as medical advice for individual conditions or treatments  Talk to your doctor, nurse or pharmacist before following any medical regimen to see if it is safe and effective for you  Obesity   AMBULATORY CARE:   Obesity  is when your body mass index (BMI) is greater than 30  Your healthcare provider will use your height and weight to measure your BMI  The risks of obesity include  many health problems, such as injuries or physical disability  You may need tests to check for the following:  · Diabetes    · High blood pressure or high cholesterol    · Heart disease    · Gallbladder or liver disease    · Cancer of the colon, breast, prostate, liver, or kidney    · Sleep apnea    · Arthritis or gout    Seek care immediately if:   · You have a severe headache, confusion, or difficulty speaking  · You have weakness on one side of your body  · You have chest pain, sweating, or shortness of breath  Contact your healthcare provider if:   · You have symptoms of gallbladder or liver disease, such as pain in your upper abdomen  · You have knee or hip pain and discomfort while walking  · You have symptoms of diabetes, such as intense hunger and thirst, and frequent urination  · You have symptoms of sleep apnea, such as snoring or daytime sleepiness  · You have questions or concerns about your condition or care  Treatment for obesity  focuses on helping you lose weight to improve your health  Even a small decrease in BMI can reduce the risk for many health problems  Your healthcare provider will help you set a weight-loss goal   · Lifestyle changes  are the first step in treating obesity   These include making healthy food choices and getting regular physical activity  Your healthcare provider may suggest a weight-loss program that involves coaching, education, and therapy  · Medicine  may help you lose weight when it is used with a healthy diet and physical activity  · Surgery  can help you lose weight if you are very obese and have other health problems  There are several types of weight-loss surgery  Ask your healthcare provider for more information  Be successful losing weight:   · Set small, realistic goals  An example of a small goal is to walk for 20 minutes 5 days a week  Anther goal is to lose 5% of your body weight  · Tell friends, family members, and coworkers about your goals  and ask for their support  Ask a friend to lose weight with you, or join a weight-loss support group  · Identify foods or triggers that may cause you to overeat , and find ways to avoid them  Remove tempting high-calorie foods from your home and workplace  Place a bowl of fresh fruit on your kitchen counter  If stress causes you to eat, then find other ways to cope with stress  · Keep a diary to track what you eat and drink  Also write down how many minutes of physical activity you do each day  Weigh yourself once a week and record it in your diary  Eating changes: You will need to eat 500 to 1,000 fewer calories each day than you currently eat to lose 1 to 2 pounds a week  The following changes will help you cut calories:  · Eat smaller portions  Use small plates, no larger than 9 inches in diameter  Fill your plate half full of fruits and vegetables  Measure your food using measuring cups until you know what a serving size looks like  · Eat 3 meals and 1 or 2 snacks each day  Plan your meals in advance  Ghassan Young and eat at home most of the time  Eat slowly  Do not skip meals  Skipping meals can lead to overeating later in the day  This can make it harder for you to lose weight   Talk with a dietitian to help you make a meal plan and schedule that is right for you  · Eat fruits and vegetables at every meal   They are low in calories and high in fiber, which makes you feel full  Do not add butter, margarine, or cream sauce to vegetables  Use herbs to season steamed vegetables  · Eat less fat and fewer fried foods  Eat more baked or grilled chicken and fish  These protein sources are lower in calories and fat than red meat  Limit fast food  Dress your salads with olive oil and vinegar instead of bottled dressing  · Limit the amount of sugar you eat  Do not drink sugary beverages  Limit alcohol  Activity changes:  Physical activity is good for your body in many ways  It helps you burn calories and build strong muscles  It decreases stress and depression, and improves your mood  It can also help you sleep better  Talk to your healthcare provider before you begin an exercise program   · Exercise for at least 30 minutes 5 days a week  Start slowly  Set aside time each day for physical activity that you enjoy and that is convenient for you  It is best to do both weight training and an activity that increases your heart rate, such as walking, bicycling, or swimming  · Find ways to be more active  Do yard work and housecleaning  Walk up the stairs instead of using elevators  Spend your leisure time going to events that require walking, such as outdoor festivals or fairs  This extra physical activity can help you lose weight and keep it off  Follow up with your healthcare provider as directed: You may need to meet with a dietitian  Write down your questions so you remember to ask them during your visits  © Copyright [address] Information is for End User's use only and may not be sold, redistributed or otherwise used for commercial purposes   All illustrations and images included in CareNotes® are the copyrighted property of A D A M , Inc  or [facility] Martin Frederick   The above information is an  only  It is not intended as medical advice for individual conditions or treatments  Talk to your doctor, nurse or pharmacist before following any medical regimen to see if it is safe and effective for you  Cigarette Smoking and Your Health   AMBULATORY CARE:   Risks to your health if you smoke:  Nicotine and other chemicals found in tobacco and e-cigarettes can damage every cell in your body  Even if you are a light smoker, you have an increased risk for cancer, heart disease, and lung disease  If you are pregnant or have diabetes, smoking increases your risk for complications  Nicotine can affect an adolescent's developing brain  This can lead to trouble thinking, learning, or paying attention  Benefits to your health if you stop smoking:   · You decrease respiratory symptoms such as coughing, wheezing, and shortness of breath  · You reduce your risk for cancers of the lung, mouth, throat, kidney, bladder, pancreas, stomach, and cervix  If you already have cancer, you increase the benefits of chemotherapy  You also reduce your risk for cancer returning or a second cancer from developing  · You reduce your risk for heart disease, blood clots, heart attack, and stroke  · You reduce your risk for lung infections, and diseases such as pneumonia, asthma, chronic bronchitis, and emphysema  · Your circulation improves  More oxygen can be delivered to your body  If you have diabetes, you lower your risk for complications, such as kidney, artery, and eye diseases  You also lower your risk for nerve damage  Nerve damage can lead to amputations, poor vision, and blindness  · You improve your body's ability to heal and to fight infections  · An adolescent can help his or her brain and body develop in a healthy way  Talk to your adolescent about all the health risks of nicotine  If you can, start talking about nicotine when your child is younger than 12 years   This may make it easier for him or her not to start using nicotine as a teenager or adult  Explain to him or her that it is best never to start  It can be hard to try to quit later  Benefits to the health of others if you stop smoking:  Tobacco is harmful to nonsmokers who breathe in your secondhand smoke  The following are ways the health of others around you may improve when you stop smoking:  · You lower the risks for lung cancer and heart disease in nonsmoking adults  · If you are pregnant, you lower the risk for miscarriage, early delivery, low birth weight, and stillbirth  You also lower your baby's risk for SIDS, obesity, developmental delay, and neurobehavioral problems, such as ADHD  · If you have children, you lower their risk for ear infections, colds, pneumonia, bronchitis, and asthma  Follow up with your doctor as directed:  Write down your questions so you remember to ask them during your visits  For more information and support to stop smoking:   · [employer]  Phone: 9- 074 - 904-7268  Web Address: [employer]  [facility] [address] Information is for End User's use only and may not be sold, redistributed or otherwise used for commercial purposes  All illustrations and images included in CareNotes® are the copyrighted property of A D A M , Inc  or [facility] Martin Frederick   The above information is an  only  It is not intended as medical advice for individual conditions or treatments  Talk to your doctor, nurse or pharmacist before following any medical regimen to see if it is safe and effective for you

## 2021-06-23 ENCOUNTER — APPOINTMENT (OUTPATIENT)
Dept: LAB | Facility: CLINIC | Age: 41
End: 2021-06-23
Payer: COMMERCIAL

## 2021-06-23 DIAGNOSIS — N18.30 STAGE 3 CHRONIC KIDNEY DISEASE, UNSPECIFIED WHETHER STAGE 3A OR 3B CKD (HCC): ICD-10-CM

## 2021-06-23 DIAGNOSIS — Z13.1 SCREENING FOR DIABETES MELLITUS: ICD-10-CM

## 2021-06-23 DIAGNOSIS — I10 ESSENTIAL HYPERTENSION: ICD-10-CM

## 2021-06-23 DIAGNOSIS — Z13.6 SCREENING FOR CARDIOVASCULAR CONDITION: ICD-10-CM

## 2021-06-23 LAB
ALBUMIN SERPL BCP-MCNC: 4.2 G/DL (ref 3.5–5)
ALP SERPL-CCNC: 92 U/L (ref 46–116)
ALT SERPL W P-5'-P-CCNC: 57 U/L (ref 12–78)
ANION GAP SERPL CALCULATED.3IONS-SCNC: 4 MMOL/L (ref 4–13)
AST SERPL W P-5'-P-CCNC: 48 U/L (ref 5–45)
BILIRUB SERPL-MCNC: 0.51 MG/DL (ref 0.2–1)
BUN SERPL-MCNC: 16 MG/DL (ref 5–25)
CALCIUM SERPL-MCNC: 9.6 MG/DL (ref 8.3–10.1)
CHLORIDE SERPL-SCNC: 103 MMOL/L (ref 100–108)
CHOLEST SERPL-MCNC: 226 MG/DL (ref 50–200)
CO2 SERPL-SCNC: 31 MMOL/L (ref 21–32)
CREAT SERPL-MCNC: 1.47 MG/DL (ref 0.6–1.3)
CREAT UR-MCNC: 487 MG/DL
ERYTHROCYTE [DISTWIDTH] IN BLOOD BY AUTOMATED COUNT: 11.6 % (ref 11.6–15.1)
EST. AVERAGE GLUCOSE BLD GHB EST-MCNC: 103 MG/DL
GFR SERPL CREATININE-BSD FRML MDRD: 68 ML/MIN/1.73SQ M
GLUCOSE P FAST SERPL-MCNC: 98 MG/DL (ref 65–99)
HBA1C MFR BLD: 5.2 %
HCT VFR BLD AUTO: 42 % (ref 36.5–49.3)
HDLC SERPL-MCNC: 40 MG/DL
HGB BLD-MCNC: 13.7 G/DL (ref 12–17)
LDLC SERPL CALC-MCNC: 136 MG/DL (ref 0–100)
MCH RBC QN AUTO: 32.5 PG (ref 26.8–34.3)
MCHC RBC AUTO-ENTMCNC: 32.6 G/DL (ref 31.4–37.4)
MCV RBC AUTO: 100 FL (ref 82–98)
MICROALBUMIN UR-MCNC: 56.3 MG/L (ref 0–20)
MICROALBUMIN/CREAT 24H UR: 12 MG/G CREATININE (ref 0–30)
NONHDLC SERPL-MCNC: 186 MG/DL
PLATELET # BLD AUTO: 300 THOUSANDS/UL (ref 149–390)
PMV BLD AUTO: 10.9 FL (ref 8.9–12.7)
POTASSIUM SERPL-SCNC: 4 MMOL/L (ref 3.5–5.3)
PROT SERPL-MCNC: 8.5 G/DL (ref 6.4–8.2)
RBC # BLD AUTO: 4.21 MILLION/UL (ref 3.88–5.62)
SODIUM SERPL-SCNC: 138 MMOL/L (ref 136–145)
TRIGL SERPL-MCNC: 252 MG/DL
TSH SERPL DL<=0.05 MIU/L-ACNC: 2.32 UIU/ML (ref 0.36–3.74)
WBC # BLD AUTO: 11.78 THOUSAND/UL (ref 4.31–10.16)

## 2021-06-23 PROCEDURE — 84443 ASSAY THYROID STIM HORMONE: CPT

## 2021-06-23 PROCEDURE — 83036 HEMOGLOBIN GLYCOSYLATED A1C: CPT

## 2021-06-23 PROCEDURE — 82043 UR ALBUMIN QUANTITATIVE: CPT

## 2021-06-23 PROCEDURE — 36415 COLL VENOUS BLD VENIPUNCTURE: CPT

## 2021-06-23 PROCEDURE — 85027 COMPLETE CBC AUTOMATED: CPT

## 2021-06-23 PROCEDURE — 82570 ASSAY OF URINE CREATININE: CPT

## 2021-06-23 PROCEDURE — 80053 COMPREHEN METABOLIC PANEL: CPT

## 2021-06-23 PROCEDURE — 80061 LIPID PANEL: CPT

## 2021-08-10 ENCOUNTER — TELEPHONE (OUTPATIENT)
Dept: NEPHROLOGY | Facility: CLINIC | Age: 41
End: 2021-08-10

## 2021-08-10 DIAGNOSIS — N18.30 STAGE 3 CHRONIC KIDNEY DISEASE, UNSPECIFIED WHETHER STAGE 3A OR 3B CKD (HCC): Primary | ICD-10-CM

## 2021-08-10 NOTE — TELEPHONE ENCOUNTER
Attempted to contact patient at home# it went straight to  and VM box not set up  I called patient contact Griselda Artist at [phone] to [facility] Ieft detailed message regarding labs/urine studies to be done prior to f/u appt  informed if going through [facility] lab scripts are in system if going elsewhere asked to call us so we can send accordingly

## 2021-08-11 ENCOUNTER — APPOINTMENT (OUTPATIENT)
Dept: LAB | Facility: CLINIC | Age: 41
End: 2021-08-11
Payer: COMMERCIAL

## 2021-08-11 DIAGNOSIS — N18.30 STAGE 3 CHRONIC KIDNEY DISEASE, UNSPECIFIED WHETHER STAGE 3A OR 3B CKD (HCC): ICD-10-CM

## 2021-08-11 LAB
ANION GAP SERPL CALCULATED.3IONS-SCNC: 4 MMOL/L (ref 4–13)
BACTERIA UR QL AUTO: ABNORMAL /HPF
BILIRUB UR QL STRIP: NEGATIVE
BUN SERPL-MCNC: 16 MG/DL (ref 5–25)
CALCIUM SERPL-MCNC: 9.1 MG/DL (ref 8.3–10.1)
CHLORIDE SERPL-SCNC: 105 MMOL/L (ref 100–108)
CLARITY UR: CLEAR
CO2 SERPL-SCNC: 27 MMOL/L (ref 21–32)
COLOR UR: ABNORMAL
CREAT SERPL-MCNC: 1.43 MG/DL (ref 0.6–1.3)
GFR SERPL CREATININE-BSD FRML MDRD: 70 ML/MIN/1.73SQ M
GLUCOSE P FAST SERPL-MCNC: 98 MG/DL (ref 65–99)
GLUCOSE UR STRIP-MCNC: NEGATIVE MG/DL
HGB UR QL STRIP.AUTO: NEGATIVE
HYALINE CASTS #/AREA URNS LPF: ABNORMAL /LPF
KETONES UR STRIP-MCNC: ABNORMAL MG/DL
LEUKOCYTE ESTERASE UR QL STRIP: NEGATIVE
NITRITE UR QL STRIP: NEGATIVE
NON-SQ EPI CELLS URNS QL MICRO: ABNORMAL /HPF
PH UR STRIP.AUTO: 6 [PH]
POTASSIUM SERPL-SCNC: 4.3 MMOL/L (ref 3.5–5.3)
PROT UR STRIP-MCNC: ABNORMAL MG/DL
PTH-INTACT SERPL-MCNC: 84.6 PG/ML (ref 18.4–80.1)
RBC #/AREA URNS AUTO: ABNORMAL /HPF
SODIUM SERPL-SCNC: 136 MMOL/L (ref 136–145)
SP GR UR STRIP.AUTO: 1.02 (ref 1–1.03)
UROBILINOGEN UR QL STRIP.AUTO: 0.2 E.U./DL
WBC #/AREA URNS AUTO: ABNORMAL /HPF

## 2021-08-11 PROCEDURE — 81001 URINALYSIS AUTO W/SCOPE: CPT

## 2021-08-11 PROCEDURE — 36415 COLL VENOUS BLD VENIPUNCTURE: CPT

## 2021-08-11 PROCEDURE — 80048 BASIC METABOLIC PNL TOTAL CA: CPT

## 2021-08-11 PROCEDURE — 83970 ASSAY OF PARATHORMONE: CPT

## 2021-08-12 ENCOUNTER — TELEPHONE (OUTPATIENT)
Dept: NEPHROLOGY | Facility: CLINIC | Age: 41
End: 2021-08-12

## 2021-09-15 DIAGNOSIS — I10 ESSENTIAL HYPERTENSION: ICD-10-CM

## 2021-09-16 RX ORDER — METOPROLOL SUCCINATE 25 MG/1
TABLET, EXTENDED RELEASE ORAL
Qty: 90 TABLET | Refills: 0 | Status: SHIPPED | OUTPATIENT
Start: 2021-09-16 | End: 2022-01-18 | Stop reason: DRUGHIGH

## 2022-01-18 ENCOUNTER — OFFICE VISIT (OUTPATIENT)
Dept: FAMILY MEDICINE CLINIC | Facility: CLINIC | Age: 42
End: 2022-01-18
Payer: COMMERCIAL

## 2022-01-18 VITALS
BODY MASS INDEX: 39.17 KG/M2 | OXYGEN SATURATION: 96 % | SYSTOLIC BLOOD PRESSURE: 132 MMHG | HEART RATE: 88 BPM | RESPIRATION RATE: 16 BRPM | HEIGHT: 75 IN | DIASTOLIC BLOOD PRESSURE: 84 MMHG | WEIGHT: 315 LBS

## 2022-01-18 DIAGNOSIS — R00.0 TACHYCARDIA: ICD-10-CM

## 2022-01-18 DIAGNOSIS — N18.30 STAGE 3 CHRONIC KIDNEY DISEASE, UNSPECIFIED WHETHER STAGE 3A OR 3B CKD (HCC): ICD-10-CM

## 2022-01-18 DIAGNOSIS — I10 ESSENTIAL HYPERTENSION: ICD-10-CM

## 2022-01-18 DIAGNOSIS — I10 BENIGN ESSENTIAL HYPERTENSION: Primary | ICD-10-CM

## 2022-01-18 DIAGNOSIS — R80.1 PERSISTENT PROTEINURIA: ICD-10-CM

## 2022-01-18 PROCEDURE — 99214 OFFICE O/P EST MOD 30 MIN: CPT | Performed by: FAMILY MEDICINE

## 2022-01-18 PROCEDURE — 3725F SCREEN DEPRESSION PERFORMED: CPT | Performed by: FAMILY MEDICINE

## 2022-01-18 PROCEDURE — 3008F BODY MASS INDEX DOCD: CPT | Performed by: FAMILY MEDICINE

## 2022-01-18 PROCEDURE — 4004F PT TOBACCO SCREEN RCVD TLK: CPT | Performed by: FAMILY MEDICINE

## 2022-01-18 RX ORDER — AMLODIPINE BESYLATE 10 MG/1
10 TABLET ORAL DAILY
Qty: 90 TABLET | Refills: 1 | Status: SHIPPED | OUTPATIENT
Start: 2022-01-18

## 2022-01-18 RX ORDER — HYDROCHLOROTHIAZIDE 25 MG/1
25 TABLET ORAL DAILY
Qty: 90 TABLET | Refills: 1 | Status: SHIPPED | OUTPATIENT
Start: 2022-01-18

## 2022-01-18 RX ORDER — METOPROLOL SUCCINATE 25 MG/1
25 TABLET, EXTENDED RELEASE ORAL DAILY
Qty: 90 TABLET | Refills: 1 | Status: SHIPPED | OUTPATIENT
Start: 2022-01-18

## 2022-01-18 NOTE — PROGRESS NOTES
Assessment/Plan:    Problem List Items Addressed This Visit        Cardiovascular and Mediastinum    Essential hypertension - Primary    Relevant Medications    metoprolol succinate (TOPROL-XL) 25 mg 24 hr tablet    amLODIPine (NORVASC) 10 mg tablet    hydrochlorothiazide (HYDRODIURIL) 25 mg tablet  Discussed about quitting smoking and low-salt diet and drink more fluids and follow-up after the labs       Genitourinary    Stage 3 chronic kidney disease (HCC)    Relevant Medications    hydrochlorothiazide (HYDRODIURIL) 25 mg tablet    Other Relevant Orders    CBC and differential    Comprehensive metabolic panel       Other    Persistent proteinuria    Tachycardia          Return in about 3 weeks (around 2/8/2022) for Recheck  Chief Complaint   Patient presents with    Follow-up       Subjective:   Patient ID: Mauro Harrington is a 39 y o  male  He is here follow-up on hypertension, he says for last 1 month he was out of amlodipine and the hydrochlorothiazide and for last few days he is out of metoprolol, his blood pressure remains high, he says he saw the eye doctor and he told him he has some effect on his eyes due to hypertension, he still smoke, drinks alcohol and marijuana use and he says there is no change in it, he denies any heart racing palpitation chest pain or leg swelling      Review of Systems   Constitutional: Negative for activity change, appetite change, chills, fatigue, fever and unexpected weight change  HENT: Negative for congestion, ear discharge, ear pain, nosebleeds, postnasal drip, rhinorrhea, sinus pressure, sneezing, sore throat, trouble swallowing and voice change  Eyes: Negative for photophobia, pain, discharge, redness and itching  Respiratory: Negative for cough, chest tightness, shortness of breath and wheezing  Cardiovascular: Negative for chest pain, palpitations and leg swelling  Gastrointestinal: Negative for abdominal pain, constipation, diarrhea, nausea and vomiting  Endocrine: Negative for polyuria  Genitourinary: Negative for dysuria, frequency and urgency  Musculoskeletal: Negative for arthralgias, back pain, myalgias and neck pain  Skin: Negative for color change, pallor and rash  Allergic/Immunologic: Negative for environmental allergies and food allergies  Neurological: Negative for dizziness, weakness, light-headedness and headaches  Hematological: Negative for adenopathy  Does not bruise/bleed easily  Psychiatric/Behavioral: Negative for behavioral problems  The patient is not nervous/anxious  Objective:  Physical Exam  Vitals and nursing note reviewed  Constitutional:       Appearance: He is well-developed  HENT:      Head: Normocephalic and atraumatic  Eyes:      General: No scleral icterus  Conjunctiva/sclera: Conjunctivae normal       Pupils: Pupils are equal, round, and reactive to light  Neck:      Thyroid: No thyromegaly  Cardiovascular:      Rate and Rhythm: Normal rate and regular rhythm  Heart sounds: Normal heart sounds  No murmur heard  Pulmonary:      Effort: Pulmonary effort is normal       Breath sounds: Normal breath sounds  No wheezing or rales  Musculoskeletal:      Cervical back: Normal range of motion and neck supple  Lymphadenopathy:      Cervical: No cervical adenopathy  Skin:     Findings: No erythema or rash  Neurological:      Mental Status: He is alert     Psychiatric:         Mood and Affect: Mood normal             Past Surgical History:   Procedure Laterality Date    BACK SURGERY      FINGER SURGERY Right     KNEE SURGERY Right     SHOULDER ARTHROPLASTY Right        Family History   Problem Relation Age of Onset    Heart disease Mother     Hypertension Mother     No Known Problems Father          Current Outpatient Medications:     amLODIPine (NORVASC) 10 mg tablet, Take 1 tablet (10 mg total) by mouth daily, Disp: 90 tablet, Rfl: 1    hydrochlorothiazide (HYDRODIURIL) 25 mg tablet, Take 1 tablet (25 mg total) by mouth daily, Disp: 90 tablet, Rfl: 1    metoprolol succinate (TOPROL-XL) 25 mg 24 hr tablet, Take 1 tablet (25 mg total) by mouth daily, Disp: 90 tablet, Rfl: 1    No Known Allergies    Vitals:    01/18/22 1047 01/18/22 1105   BP: (!) 182/110 132/84   Pulse: (!) 106 88   Resp: 16    SpO2: 96%    Weight: (!) 151 kg (332 lb)    Height: 6' 3" (1 905 m)

## 2022-02-07 ENCOUNTER — TELEPHONE (OUTPATIENT)
Dept: BARIATRICS | Facility: CLINIC | Age: 42
End: 2022-02-07

## 2022-03-08 ENCOUNTER — TELEPHONE (OUTPATIENT)
Dept: PSYCHIATRY | Facility: CLINIC | Age: 42
End: 2022-03-08

## 2022-09-06 ENCOUNTER — TELEPHONE (OUTPATIENT)
Dept: PSYCHIATRY | Facility: CLINIC | Age: 42
End: 2022-09-06

## 2022-09-24 DIAGNOSIS — I10 ESSENTIAL HYPERTENSION: ICD-10-CM

## 2022-09-24 DIAGNOSIS — I10 BENIGN ESSENTIAL HYPERTENSION: ICD-10-CM

## 2022-09-27 RX ORDER — METOPROLOL SUCCINATE 25 MG/1
25 TABLET, EXTENDED RELEASE ORAL DAILY
Qty: 90 TABLET | Refills: 1 | Status: SHIPPED | OUTPATIENT
Start: 2022-09-27

## 2022-09-27 RX ORDER — HYDROCHLOROTHIAZIDE 25 MG/1
25 TABLET ORAL DAILY
Qty: 90 TABLET | Refills: 1 | Status: SHIPPED | OUTPATIENT
Start: 2022-09-27

## 2022-09-27 RX ORDER — AMLODIPINE BESYLATE 10 MG/1
TABLET ORAL
Qty: 90 TABLET | Refills: 1 | Status: SHIPPED | OUTPATIENT
Start: 2022-09-27

## 2022-09-27 NOTE — TELEPHONE ENCOUNTER
Left a detailed message for patient that he is past due for a follow up with Dr Harley Rm   Needs to schedule an appointment

## 2022-10-12 PROBLEM — Z13.6 SCREENING FOR CARDIOVASCULAR CONDITION: Status: RESOLVED | Noted: 2021-06-22 | Resolved: 2022-10-12

## 2023-09-01 ENCOUNTER — APPOINTMENT (INPATIENT)
Dept: NON INVASIVE DIAGNOSTICS | Facility: HOSPITAL | Age: 43
End: 2023-09-01
Payer: COMMERCIAL

## 2023-09-01 ENCOUNTER — HOSPITAL ENCOUNTER (INPATIENT)
Facility: HOSPITAL | Age: 43
LOS: 3 days | Discharge: HOME | End: 2023-09-04
Attending: EMERGENCY MEDICINE | Admitting: INTERNAL MEDICINE
Payer: COMMERCIAL

## 2023-09-01 ENCOUNTER — APPOINTMENT (EMERGENCY)
Dept: RADIOLOGY | Facility: HOSPITAL | Age: 43
End: 2023-09-01
Payer: COMMERCIAL

## 2023-09-01 DIAGNOSIS — E66.01 MORBID OBESITY WITH BMI OF 40.0-44.9, ADULT (HCC): ICD-10-CM

## 2023-09-01 DIAGNOSIS — R06.01 ORTHOPNEA: ICD-10-CM

## 2023-09-01 DIAGNOSIS — I10 ESSENTIAL HYPERTENSION: ICD-10-CM

## 2023-09-01 DIAGNOSIS — I50.9 ACUTE EXACERBATION OF CHF (CONGESTIVE HEART FAILURE) (HCC): Primary | ICD-10-CM

## 2023-09-01 DIAGNOSIS — I50.9 ACUTE HEART FAILURE (HCC): ICD-10-CM

## 2023-09-01 PROBLEM — R03.0 ELEVATED BLOOD PRESSURE READING: Status: ACTIVE | Noted: 2023-09-01

## 2023-09-01 LAB
4HR DELTA HS TROPONIN: 1 NG/L
ALBUMIN SERPL BCP-MCNC: 4.4 G/DL (ref 3.5–5)
ALP SERPL-CCNC: 75 U/L (ref 34–104)
ALT SERPL W P-5'-P-CCNC: 32 U/L (ref 7–52)
ANION GAP SERPL CALCULATED.3IONS-SCNC: 8 MMOL/L
AORTIC ROOT: 3.9 CM
ASCENDING AORTA: 4.1 CM
AST SERPL W P-5'-P-CCNC: 44 U/L (ref 13–39)
AV REGURGITATION PRESSURE HALF TIME: 386 MS
BACTERIA UR QL AUTO: ABNORMAL /HPF
BASE EX.OXY STD BLDV CALC-SCNC: 94.9 % (ref 60–80)
BASE EXCESS BLDV CALC-SCNC: -0.2 MMOL/L
BASOPHILS # BLD AUTO: 0.05 THOUSANDS/ÂΜL (ref 0–0.1)
BASOPHILS NFR BLD AUTO: 1 % (ref 0–1)
BILIRUB SERPL-MCNC: 0.66 MG/DL (ref 0.2–1)
BILIRUB UR QL STRIP: NEGATIVE
BNP SERPL-MCNC: 713 PG/ML (ref 0–100)
BUN SERPL-MCNC: 14 MG/DL (ref 5–25)
CALCIUM SERPL-MCNC: 9 MG/DL (ref 8.4–10.2)
CARDIAC TROPONIN I PNL SERPL HS: 21 NG/L
CARDIAC TROPONIN I PNL SERPL HS: 22 NG/L
CHLORIDE SERPL-SCNC: 108 MMOL/L (ref 96–108)
CLARITY UR: CLEAR
CO2 SERPL-SCNC: 23 MMOL/L (ref 21–32)
COLOR UR: ABNORMAL
CREAT SERPL-MCNC: 1.38 MG/DL (ref 0.6–1.3)
E WAVE DECELERATION TIME: 96 MS
EOSINOPHIL # BLD AUTO: 0.11 THOUSAND/ÂΜL (ref 0–0.61)
EOSINOPHIL NFR BLD AUTO: 1 % (ref 0–6)
ERYTHROCYTE [DISTWIDTH] IN BLOOD BY AUTOMATED COUNT: 12.4 % (ref 11.6–15.1)
FLUAV RNA RESP QL NAA+PROBE: NEGATIVE
FLUBV RNA RESP QL NAA+PROBE: NEGATIVE
FRACTIONAL SHORTENING: 23 (ref 28–44)
GFR SERPL CREATININE-BSD FRML MDRD: 62 ML/MIN/1.73SQ M
GLUCOSE SERPL-MCNC: 109 MG/DL (ref 65–140)
GLUCOSE UR STRIP-MCNC: NEGATIVE MG/DL
HCO3 BLDV-SCNC: 23.7 MMOL/L (ref 24–30)
HCT VFR BLD AUTO: 41.9 % (ref 36.5–49.3)
HGB BLD-MCNC: 13.2 G/DL (ref 12–17)
HGB UR QL STRIP.AUTO: NEGATIVE
IMM GRANULOCYTES # BLD AUTO: 0.02 THOUSAND/UL (ref 0–0.2)
IMM GRANULOCYTES NFR BLD AUTO: 0 % (ref 0–2)
INTERVENTRICULAR SEPTUM IN DIASTOLE (PARASTERNAL SHORT AXIS VIEW): 1.2 CM
INTERVENTRICULAR SEPTUM: 1.2 CM (ref 0.6–1.1)
KETONES UR STRIP-MCNC: NEGATIVE MG/DL
LAAS-AP2: 33.4 CM2
LAAS-AP4: 33.4 CM2
LACTATE SERPL-SCNC: 1.2 MMOL/L (ref 0.5–2)
LEFT ATRIUM SIZE: 5.6 CM
LEFT ATRIUM VOLUME (MOD BIPLANE): 133 ML
LEFT INTERNAL DIMENSION IN SYSTOLE: 5.1 CM (ref 2.1–4)
LEFT VENTRICULAR INTERNAL DIMENSION IN DIASTOLE: 6.6 CM (ref 3.5–6)
LEFT VENTRICULAR POSTERIOR WALL IN END DIASTOLE: 1.2 CM
LEFT VENTRICULAR STROKE VOLUME: 99 ML
LEUKOCYTE ESTERASE UR QL STRIP: NEGATIVE
LV EF: 41 %
LVSV (TEICH): 99 ML
LYMPHOCYTES # BLD AUTO: 1.8 THOUSANDS/ÂΜL (ref 0.6–4.47)
LYMPHOCYTES NFR BLD AUTO: 20 % (ref 14–44)
MAGNESIUM SERPL-MCNC: 2.2 MG/DL (ref 1.9–2.7)
MCH RBC QN AUTO: 31.7 PG (ref 26.8–34.3)
MCHC RBC AUTO-ENTMCNC: 31.5 G/DL (ref 31.4–37.4)
MCV RBC AUTO: 101 FL (ref 82–98)
MONOCYTES # BLD AUTO: 0.61 THOUSAND/ÂΜL (ref 0.17–1.22)
MONOCYTES NFR BLD AUTO: 7 % (ref 4–12)
MUCOUS THREADS UR QL AUTO: ABNORMAL
MV E'TISSUE VEL-SEP: 9 CM/S
MV PEAK A VEL: 0.51 M/S
MV PEAK E VEL: 113 CM/S
MV STENOSIS PRESSURE HALF TIME: 28 MS
MV VALVE AREA P 1/2 METHOD: 7.86
NEUTROPHILS # BLD AUTO: 6.53 THOUSANDS/ÂΜL (ref 1.85–7.62)
NEUTS SEG NFR BLD AUTO: 71 % (ref 43–75)
NITRITE UR QL STRIP: NEGATIVE
NON-SQ EPI CELLS URNS QL MICRO: ABNORMAL /HPF
NRBC BLD AUTO-RTO: 0 /100 WBCS
O2 CT BLDV-SCNC: 19.5 ML/DL
PCO2 BLDV: 36.2 MM HG (ref 42–50)
PH BLDV: 7.43 [PH] (ref 7.3–7.4)
PH UR STRIP.AUTO: 6 [PH]
PLATELET # BLD AUTO: 189 THOUSANDS/UL (ref 149–390)
PMV BLD AUTO: 11.2 FL (ref 8.9–12.7)
PO2 BLDV: 107.1 MM HG (ref 35–45)
POTASSIUM SERPL-SCNC: 3.7 MMOL/L (ref 3.5–5.3)
PROT SERPL-MCNC: 7.4 G/DL (ref 6.4–8.4)
PROT UR STRIP-MCNC: ABNORMAL MG/DL
RA PRESSURE ESTIMATED: 15 MMHG
RBC # BLD AUTO: 4.16 MILLION/UL (ref 3.88–5.62)
RBC #/AREA URNS AUTO: ABNORMAL /HPF
RIGHT ATRIUM AREA SYSTOLE A4C: 23.4 CM2
RIGHT VENTRICLE ID DIMENSION: 4.1 CM
RSV RNA RESP QL NAA+PROBE: NEGATIVE
RV PSP: 70 MMHG
SARS-COV-2 RNA RESP QL NAA+PROBE: NEGATIVE
SL CV AV DECELERATION TIME RETROGRADE: 1331 MS
SL CV AV PEAK GRADIENT RETROGRADE: 62 MMHG
SL CV LEFT ATRIUM LENGTH A2C: 7 CM
SL CV LV EF: 40
SL CV PED ECHO LEFT VENTRICLE DIASTOLIC VOLUME (MOD BIPLANE) 2D: 224 ML
SL CV PED ECHO LEFT VENTRICLE SYSTOLIC VOLUME (MOD BIPLANE) 2D: 125 ML
SODIUM SERPL-SCNC: 139 MMOL/L (ref 135–147)
SP GR UR STRIP.AUTO: 1.02 (ref 1–1.03)
TR MAX PG: 55 MMHG
TR PEAK VELOCITY: 3.7 M/S
TRICUSPID ANNULAR PLANE SYSTOLIC EXCURSION: 2.1 CM
TRICUSPID VALVE PEAK REGURGITATION VELOCITY: 3.89 M/S
UROBILINOGEN UR STRIP-ACNC: <2 MG/DL
WBC # BLD AUTO: 9.12 THOUSAND/UL (ref 4.31–10.16)
WBC #/AREA URNS AUTO: ABNORMAL /HPF

## 2023-09-01 PROCEDURE — 83735 ASSAY OF MAGNESIUM: CPT

## 2023-09-01 PROCEDURE — 71045 X-RAY EXAM CHEST 1 VIEW: CPT

## 2023-09-01 PROCEDURE — 83880 ASSAY OF NATRIURETIC PEPTIDE: CPT

## 2023-09-01 PROCEDURE — 93306 TTE W/DOPPLER COMPLETE: CPT

## 2023-09-01 PROCEDURE — 99285 EMERGENCY DEPT VISIT HI MDM: CPT | Performed by: EMERGENCY MEDICINE

## 2023-09-01 PROCEDURE — 93306 TTE W/DOPPLER COMPLETE: CPT | Performed by: INTERNAL MEDICINE

## 2023-09-01 PROCEDURE — 82805 BLOOD GASES W/O2 SATURATION: CPT

## 2023-09-01 PROCEDURE — 36415 COLL VENOUS BLD VENIPUNCTURE: CPT

## 2023-09-01 PROCEDURE — 99223 1ST HOSP IP/OBS HIGH 75: CPT | Performed by: INTERNAL MEDICINE

## 2023-09-01 PROCEDURE — 0241U HB NFCT DS VIR RESP RNA 4 TRGT: CPT

## 2023-09-01 PROCEDURE — 99285 EMERGENCY DEPT VISIT HI MDM: CPT

## 2023-09-01 PROCEDURE — 93005 ELECTROCARDIOGRAM TRACING: CPT

## 2023-09-01 PROCEDURE — 96374 THER/PROPH/DIAG INJ IV PUSH: CPT

## 2023-09-01 PROCEDURE — 85025 COMPLETE CBC W/AUTO DIFF WBC: CPT

## 2023-09-01 PROCEDURE — 96375 TX/PRO/DX INJ NEW DRUG ADDON: CPT

## 2023-09-01 PROCEDURE — 84484 ASSAY OF TROPONIN QUANT: CPT

## 2023-09-01 PROCEDURE — 83605 ASSAY OF LACTIC ACID: CPT

## 2023-09-01 PROCEDURE — 80053 COMPREHEN METABOLIC PANEL: CPT

## 2023-09-01 PROCEDURE — 81001 URINALYSIS AUTO W/SCOPE: CPT

## 2023-09-01 RX ORDER — METOPROLOL SUCCINATE 25 MG/1
25 TABLET, EXTENDED RELEASE ORAL DAILY
Status: DISCONTINUED | OUTPATIENT
Start: 2023-09-01 | End: 2023-09-01

## 2023-09-01 RX ORDER — LABETALOL HYDROCHLORIDE 5 MG/ML
20 INJECTION, SOLUTION INTRAVENOUS ONCE
Status: COMPLETED | OUTPATIENT
Start: 2023-09-01 | End: 2023-09-01

## 2023-09-01 RX ORDER — ENOXAPARIN SODIUM 100 MG/ML
40 INJECTION SUBCUTANEOUS 2 TIMES DAILY
Status: DISCONTINUED | OUTPATIENT
Start: 2023-09-01 | End: 2023-09-04 | Stop reason: HOSPADM

## 2023-09-01 RX ORDER — NITROGLYCERIN 0.4 MG/1
0.4 TABLET SUBLINGUAL
Status: DISCONTINUED | OUTPATIENT
Start: 2023-09-01 | End: 2023-09-04 | Stop reason: HOSPADM

## 2023-09-01 RX ORDER — POTASSIUM CHLORIDE 20 MEQ/1
40 TABLET, EXTENDED RELEASE ORAL ONCE
Status: COMPLETED | OUTPATIENT
Start: 2023-09-01 | End: 2023-09-01

## 2023-09-01 RX ORDER — NICOTINE 21 MG/24HR
1 PATCH, TRANSDERMAL 24 HOURS TRANSDERMAL DAILY
Status: DISCONTINUED | OUTPATIENT
Start: 2023-09-01 | End: 2023-09-04 | Stop reason: HOSPADM

## 2023-09-01 RX ORDER — METOPROLOL TARTRATE 5 MG/5ML
2.5 INJECTION INTRAVENOUS EVERY 6 HOURS PRN
Status: DISCONTINUED | OUTPATIENT
Start: 2023-09-01 | End: 2023-09-04 | Stop reason: HOSPADM

## 2023-09-01 RX ORDER — FUROSEMIDE 10 MG/ML
20 INJECTION INTRAMUSCULAR; INTRAVENOUS ONCE
Status: COMPLETED | OUTPATIENT
Start: 2023-09-01 | End: 2023-09-01

## 2023-09-01 RX ORDER — METOPROLOL SUCCINATE 25 MG/1
25 TABLET, EXTENDED RELEASE ORAL DAILY
Status: DISCONTINUED | OUTPATIENT
Start: 2023-09-01 | End: 2023-09-02

## 2023-09-01 RX ORDER — ASPIRIN 81 MG/1
324 TABLET, CHEWABLE ORAL ONCE
Status: DISCONTINUED | OUTPATIENT
Start: 2023-09-01 | End: 2023-09-01

## 2023-09-01 RX ORDER — HYDROCHLOROTHIAZIDE 25 MG/1
25 TABLET ORAL DAILY
Status: DISCONTINUED | OUTPATIENT
Start: 2023-09-01 | End: 2023-09-01

## 2023-09-01 RX ORDER — FUROSEMIDE 10 MG/ML
40 INJECTION INTRAMUSCULAR; INTRAVENOUS
Status: DISCONTINUED | OUTPATIENT
Start: 2023-09-01 | End: 2023-09-03

## 2023-09-01 RX ORDER — ACETAMINOPHEN 325 MG/1
650 TABLET ORAL EVERY 6 HOURS PRN
Status: DISCONTINUED | OUTPATIENT
Start: 2023-09-01 | End: 2023-09-04 | Stop reason: HOSPADM

## 2023-09-01 RX ADMIN — FUROSEMIDE 20 MG: 10 INJECTION, SOLUTION INTRAMUSCULAR; INTRAVENOUS at 04:45

## 2023-09-01 RX ADMIN — Medication 20 MG: at 04:40

## 2023-09-01 RX ADMIN — NICOTINE 1 PATCH: 14 PATCH, EXTENDED RELEASE TRANSDERMAL at 10:19

## 2023-09-01 RX ADMIN — ENOXAPARIN SODIUM 40 MG: 40 INJECTION SUBCUTANEOUS at 10:19

## 2023-09-01 RX ADMIN — METOROPROLOL TARTRATE 2.5 MG: 5 INJECTION, SOLUTION INTRAVENOUS at 16:19

## 2023-09-01 RX ADMIN — METOPROLOL SUCCINATE 25 MG: 25 TABLET, EXTENDED RELEASE ORAL at 06:30

## 2023-09-01 RX ADMIN — NITROGLYCERIN 0.5 INCH: 20 OINTMENT TOPICAL at 03:49

## 2023-09-01 RX ADMIN — FUROSEMIDE 20 MG: 10 INJECTION, SOLUTION INTRAMUSCULAR; INTRAVENOUS at 06:30

## 2023-09-01 RX ADMIN — ACETAMINOPHEN 650 MG: 325 TABLET ORAL at 16:18

## 2023-09-01 RX ADMIN — ENOXAPARIN SODIUM 40 MG: 40 INJECTION SUBCUTANEOUS at 17:32

## 2023-09-01 RX ADMIN — FUROSEMIDE 40 MG: 10 INJECTION, SOLUTION INTRAMUSCULAR; INTRAVENOUS at 15:23

## 2023-09-01 RX ADMIN — POTASSIUM CHLORIDE 40 MEQ: 1500 TABLET, EXTENDED RELEASE ORAL at 06:30

## 2023-09-01 NOTE — PLAN OF CARE
Problem: PAIN - ADULT  Goal: Verbalizes/displays adequate comfort level or baseline comfort level  Description: Interventions:  - Encourage patient to monitor pain and request assistance  - Assess pain using appropriate pain scale  - Administer analgesics based on type and severity of pain and evaluate response  - Implement non-pharmacological measures as appropriate and evaluate response  - Consider cultural and social influences on pain and pain management  - Notify physician/advanced practitioner if interventions unsuccessful or patient reports new pain  Outcome: Progressing     Problem: INFECTION - ADULT  Goal: Absence or prevention of progression during hospitalization  Description: INTERVENTIONS:  - Assess and monitor for signs and symptoms of infection  - Monitor lab/diagnostic results  - Monitor all insertion sites, i.e. indwelling lines, tubes, and drains  - Monitor endotracheal if appropriate and nasal secretions for changes in amount and color  - [place] appropriate cooling/warming therapies per order  - Administer medications as ordered  - Instruct and encourage patient and family to use good hand hygiene technique  - Identify and instruct in appropriate isolation precautions for identified infection/condition  Outcome: Progressing  Goal: Absence of fever/infection during neutropenic period  Description: INTERVENTIONS:  - Monitor WBC    Outcome: Progressing     Problem: SAFETY ADULT  Goal: Patient will remain free of falls  Description: INTERVENTIONS:  - Educate patient/family on patient safety including physical limitations  - Instruct patient to call for assistance with activity   - Consult OT/PT to assist with strengthening/mobility   - Keep Call bell within reach  - Keep bed low and locked with side rails adjusted as appropriate  - Keep care items and personal belongings within reach  - Initiate and maintain comfort rounds  - Make Fall Risk Sign visible to staff  - Offer Toileting every  Hours, in advance of need  - Initiate/Maintain alarm  - Obtain necessary fall risk management equipment:   - Apply yellow socks and bracelet for high fall risk patients  - Consider moving patient to room near nurses station  Outcome: Progressing  Goal: Maintain or return to baseline ADL function  Description: INTERVENTIONS:  -  Assess patient's ability to carry out ADLs; assess patient's baseline for ADL function and identify physical deficits which impact ability to perform ADLs (bathing, care of mouth/teeth, toileting, grooming, dressing, etc.)  - Assess/evaluate cause of self-care deficits   - Assess range of motion  - Assess patient's mobility; develop plan if impaired  - Assess patient's need for assistive devices and provide as appropriate  - Encourage maximum independence but intervene and supervise when necessary  - Involve family in performance of ADLs  - Assess for home care needs following discharge   - Consider OT consult to assist with ADL evaluation and planning for discharge  - Provide patient education as appropriate  Outcome: Progressing  Goal: Maintains/Returns to pre admission functional level  Description: INTERVENTIONS:  - Perform BMAT or MOVE assessment daily.   - Set and communicate daily mobility goal to care team and patient/family/caregiver. - Collaborate with rehabilitation services on mobility goals if consulted  - Perform Range of Motion  times a day. - Reposition patient every  hours.   - Dangle patient  times a day  - Stand patient  times a day  - Ambulate patient  times a day  - Out of bed to chair  times a day   - Out of bed for meals  times a day  - Out of bed for toileting  - Record patient progress and toleration of activity level   Outcome: Progressing     Problem: DISCHARGE PLANNING  Goal: Discharge to home or other facility with appropriate resources  Description: INTERVENTIONS:  - Identify barriers to discharge w/patient and caregiver  - Arrange for needed discharge resources and transportation as appropriate  - Identify discharge learning needs (meds, wound care, etc.)  - Arrange for interpretive services to assist at discharge as needed  - Refer to Case Management Department for coordinating discharge planning if the patient needs post-hospital services based on physician/advanced practitioner order or complex needs related to functional status, cognitive ability, or social support system  Outcome: Progressing

## 2023-09-01 NOTE — H&P
[facility]  H&P  Name: Carmen Bird 43 y.o. male I MRN: [id number]  Unit/Bed#: ED-22 I Date of Admission: 9/1/2023   Date of Service: 9/1/2023 I Hospital Day: 0      Assessment/Plan   * Acute heart failure [facility])  Assessment & Plan  Wt Readings from Last 3 Encounters:   09/01/23 (!) 146 kg (322 lb 1.5 oz)   01/18/22 (!) 151 kg (332 lb)   06/22/21 (!) 152 kg (335 lb)      Presents with increased shortness of breath, orthopnea, denies weight gain. Patient has lost 5 pounds. Lab Results   Component Value Date     (H) 09/01/2023     Plan:  Continue to trend troponins  Lasix 40 IV BID. Patient received 20 ready we will give another 20  Echo   maintain Mg > 2 and K > 4; Replete as needed  Elevate head of bed to at least 30°, Daily weights, Measure I/Os  Consult nutrition services for dietary education  Sodium less than 2 g, fluids less than 1.8 L      Morbid obesity with BMI of 40.0-44.9, adult ([address])  Assessment & Plan  Advised weight loss    Essential hypertension  Assessment & Plan  Patient previously used to take amlodipine, hydrochlorothiazide, metoprolol however he has lost insurance  Currently on Medicaid  Wants to speak to   Restart metoprolol succinate, hydrochlorothiazide    Stage 3 chronic kidney disease [facility])  Assessment & Plan  Lab Results   Component Value Date    EGFR 62 09/01/2023    EGFR 70 08/11/2021    EGFR 68 06/23/2021    CREATININE 1.38 (H) 09/01/2023    CREATININE 1.43 (H) 08/11/2021    CREATININE 1.47 (H) 06/23/2021   Creatinine 1.30-1.4, currently at baseline  Continue to monitor        VTE Pharmacologic Prophylaxis: VTE Score: 6 High Risk (Score >/= 5) - Pharmacological DVT Prophylaxis Ordered: enoxaparin (Lovenox). Sequential Compression Devices Ordered. Code Status: level 1 full code  Discussion with family: Updated  (wife) at bedside.     Anticipated Length of Stay: Patient will be admitted on an inpatient basis with an anticipated length of stay of greater than 2 midnights secondary to acute CHF. Chief Complaint: Shortness of breath    History of Present Illness:  Lis Plaza is a 43 y.o. male with a PMH of hypertension, obesity, stage III CKD, has been lost to follow-up with PCP who presents with shortness of breath. Patient notes 2-day history of orthopnea, worsening dyspnea with exertion, short of breath with short walks. ,  Dry cough. No fevers, no chills, no chest pain, abdominal pain, no lower leg edema. Patient has not been following up with PCP and has stopped taking his hypertension medications since last year because he is no longer insured. Currently however he does have Medicaid  In the emergency department patient had initial blood pressure 195/196. Patient was given 1 dose of labetalol. Blood pressure went down to 167/102. Patient is afebrile. X-ray appears to have pulmonary congestion. Review of Systems   Constitutional: Negative for chills and fever. HENT: Negative for ear pain and sore throat. Eyes: Negative for pain and visual disturbance. Respiratory: Positive for cough and shortness of breath. Cardiovascular: Negative for chest pain and palpitations. Gastrointestinal: Negative for abdominal pain and vomiting. Genitourinary: Negative for dysuria and hematuria. Musculoskeletal: Negative for arthralgias and back pain. Skin: Negative for color change and rash. Neurological: Negative for seizures and syncope. All other systems reviewed and are negative. Past Medical and Surgical History:   Past Medical History:   Diagnosis Date   • Hypertension    • Morbid obesity with BMI of 40.0-44.9, adult [facility])        Past Surgical History:   Procedure Laterality Date   • BACK SURGERY     • FINGER SURGERY Right    • KNEE SURGERY Right    • SHOULDER ARTHROPLASTY Right        Meds/Allergies:  Prior to Admission medications    Medication Sig Start Date End Date Taking?  Authorizing Provider amLODIPine (NORVASC) 10 mg tablet TAKE 1 TABLET BY MOUTH EVERY DAY 9/27/22   Genesis Quarles MD   hydrochlorothiazide (HYDRODIURIL) 25 mg tablet TAKE 1 TABLET (25 MG TOTAL) BY MOUTH DAILY. 9/27/22   Genesis Quarles MD   metoprolol succinate (TOPROL-XL) 25 mg 24 hr tablet TAKE 1 TABLET (25 MG TOTAL) BY MOUTH DAILY. 9/27/22   Genesis Quarles MD     I have reviewed home medications with patient personally. Allergies: No Known Allergies    Social History:  Marital Status: /Civil Union   Occupation: warehouse  Patient Pre-hospital Living Situation: Home  Patient Pre-hospital Level of Mobility: walks  Patient Pre-hospital Diet Restrictions: none  Substance Use History:   Social History     Substance and Sexual Activity   Alcohol Use Yes     Social History     Tobacco Use   Smoking Status Every Day   • Packs/day: 0.25   • Types: Cigarettes   Smokeless Tobacco Never     Social History     Substance and Sexual Activity   Drug Use Yes   • Types: Marijuana       Family History:  Family History   Problem Relation Age of Onset   • Heart disease Mother    • Hypertension Mother    • No Known Problems Father        Physical Exam:     Vitals:   Blood Pressure: (!) 161/108 (09/01/23 0500)  Pulse: 89 (09/01/23 0500)  Temperature: 98.1 °F (36.7 °C) (09/01/23 0329)  Temp Source: Oral (09/01/23 0329)  Respirations: 20 (09/01/23 0500)  Height: 6' 3" (190.5 cm) (09/01/23 0329)  Weight - Scale: (!) 146 kg (322 lb 1.5 oz) (09/01/23 0329)  SpO2: 98 % (09/01/23 0500)    Physical Exam  Vitals and nursing note reviewed. Constitutional:       Appearance: He is well-developed and normal weight. He is ill-appearing. Comments: Patient appears comfortably lying down at a 30 degree angle   HENT:      Head: Normocephalic and atraumatic. Nose: Nose normal.      Mouth/Throat:      Mouth: Mucous membranes are moist.   Eyes:      Conjunctiva/sclera: Conjunctivae normal.   Cardiovascular:      Rate and Rhythm: Normal rate and regular rhythm. Heart sounds: Normal heart sounds. No murmur heard. Pulmonary:      Effort: Pulmonary effort is normal. No respiratory distress. Breath sounds: Rales (Bilateral lung fields) present. Abdominal:      General: Abdomen is flat. Palpations: Abdomen is soft. Tenderness: There is no abdominal tenderness. Musculoskeletal:         General: No swelling. Cervical back: Neck supple. Right lower leg: No edema. Left lower leg: No edema. Skin:     General: Skin is warm and dry. Capillary Refill: Capillary refill takes less than 2 seconds. Neurological:      General: No focal deficit present. Mental Status: He is alert and oriented to person, place, and time. Mental status is at baseline. Psychiatric:         Mood and Affect: Mood normal.          Additional Data:     Lab Results:  Results from last 7 days   Lab Units 09/01/23  0344   WBC Thousand/uL 9.12   HEMOGLOBIN g/dL 13.2   HEMATOCRIT % 41.9   PLATELETS Thousands/uL 189   NEUTROS PCT % 71   LYMPHS PCT % 20   MONOS PCT % 7   EOS PCT % 1     Results from last 7 days   Lab Units 09/01/23  0344   SODIUM mmol/L 139   POTASSIUM mmol/L 3.7   CHLORIDE mmol/L 108   CO2 mmol/L 23   BUN mg/dL 14   CREATININE mg/dL 1.38*   ANION GAP mmol/L 8   CALCIUM mg/dL 9.0   ALBUMIN g/dL 4.4   TOTAL BILIRUBIN mg/dL 0.66   ALK PHOS U/L 75   ALT U/L 32   AST U/L 44*   GLUCOSE RANDOM mg/dL 109                 Results from last 7 days   Lab Units 09/01/23  0344   LACTIC ACID mmol/L 1.2       Lines/Drains:  Invasive Devices     Peripheral Intravenous Line  Duration           Peripheral IV 09/01/23 Right;Ventral (anterior) Forearm <1 day                    Imaging: Reviewed radiology reports from this admission including: chest xray  XR chest 1 view portable   ED Interpretation by Dieudonne Hinkle MD (09/01 [id number])   Diffuse alveolar infiltrates likely 2/2 pulmonary edema          EKG and Other Studies Reviewed on Admission:   · EKG: NSR.  HR 80.    ** Please Note: This note has been constructed using a voice recognition system.  **

## 2023-09-01 NOTE — ASSESSMENT & PLAN NOTE
Lab Results   Component Value Date    EGFR 62 09/01/2023    EGFR 70 08/11/2021    EGFR 68 06/23/2021    CREATININE 1.38 (H) 09/01/2023    CREATININE 1.43 (H) 08/11/2021    CREATININE 1.47 (H) 06/23/2021   Creatinine 1.30-1.4, currently at baseline  Continue to monitor

## 2023-09-01 NOTE — ASSESSMENT & PLAN NOTE
Wt Readings from Last 3 Encounters:   09/01/23 (!) 146 kg (322 lb 1.5 oz)   01/18/22 (!) 151 kg (332 lb)   06/22/21 (!) 152 kg (335 lb)      Presents with increased shortness of breath, orthopnea, denies weight gain. Patient has lost 5 pounds. Lab Results   Component Value Date     (H) 09/01/2023     Plan:  Continue to trend troponins  Lasix 40 IV BID.   Patient received 20 ready we will give another 20  Echo   maintain Mg > 2 and K > 4; Replete as needed  Elevate head of bed to at least 30°, Daily weights, Measure I/Os  Consult nutrition services for dietary education  Sodium less than 2 g, fluids less than 1.8 L

## 2023-09-01 NOTE — ED PROVIDER NOTES
History  Chief Complaint   Patient presents with   • Shortness of Breath     Pt reports SOB x 2 days "at night" with cough and runny nose today; worsened tonight; denies CP/dizziness; hx htn - no medication x 1 year due to loss of insurance     27-year-old male with history of hypertension, obesity, CKD presenting with shortness of breath. Patient states 2-day history of intermittent shortness of breath with associated cough that only occurs while laying flat or going to sleep. Has been unable to sleep laying flat and has now been sleeping sitting up. History of hypertension with noncompliance to medication X1 year due to loss of insurance. 1 pint of beer a day. Denies tobacco use. 2 blunts of marijuana a day, last used 30 minutes ago. Denies fevers, chills, chest pain, leg swelling, abdominal pain, nausea, vomiting, dysuria, frequency, urgency, decreased urine output, constipation, diarrhea, recent long travel, recent surgery, leg pain, hormone use, history of VTE, hemoptysis. Prior to Admission Medications   Prescriptions Last Dose Informant Patient Reported? Taking? amLODIPine (NORVASC) 10 mg tablet   No No   Sig: TAKE 1 TABLET BY MOUTH EVERY DAY   hydrochlorothiazide (HYDRODIURIL) 25 mg tablet   No No   Sig: TAKE 1 TABLET (25 MG TOTAL) BY MOUTH DAILY. metoprolol succinate (TOPROL-XL) 25 mg 24 hr tablet   No No   Sig: TAKE 1 TABLET (25 MG TOTAL) BY MOUTH DAILY. Facility-Administered Medications: None       Past Medical History:   Diagnosis Date   • Hypertension    • Morbid obesity with BMI of 40.0-44.9, adult (HCC)        Past Surgical History:   Procedure Laterality Date   • BACK SURGERY     • FINGER SURGERY Right    • KNEE SURGERY Right    • SHOULDER ARTHROPLASTY Right        Family History   Problem Relation Age of Onset   • Heart disease Mother    • Hypertension Mother    • No Known Problems Father      I have reviewed and agree with the history as documented.     E-Cigarette/Vaping • E-Cigarette Use Never User      E-Cigarette/Vaping Substances     Social History     Tobacco Use   • Smoking status: Every Day     Packs/day: 0.25     Types: Cigarettes   • Smokeless tobacco: Never   Vaping Use   • Vaping Use: Never used   Substance Use Topics   • Alcohol use: Yes   • Drug use: Yes     Types: Marijuana        Review of Systems   All other systems reviewed and are negative. Physical Exam  ED Triage Vitals [09/01/23 0329]   Temperature Pulse Respirations Blood Pressure SpO2   98.1 °F (36.7 °C) (!) 115 (!) 24 (!) 195/126 95 %      Temp Source Heart Rate Source Patient Position - Orthostatic VS BP Location FiO2 (%)   Oral Monitor Sitting Left arm --      Pain Score       No Pain             Orthostatic Vital Signs  Vitals:    09/01/23 0500 09/01/23 0530 09/01/23 0610 09/01/23 0706   BP: (!) 161/108 (!) 177/110 (!) 161/110 (!) 159/105   Pulse: 89 92  90   Patient Position - Orthostatic VS:           Physical Exam  Vitals and nursing note reviewed. Constitutional:       General: He is in acute distress. Appearance: Normal appearance. He is obese. He is ill-appearing and diaphoretic. He is not toxic-appearing. Interventions: He is not intubated. HENT:      Head: Normocephalic and atraumatic. Right Ear: External ear normal.      Left Ear: External ear normal.      Nose: Nose normal.      Mouth/Throat:      Mouth: Mucous membranes are moist.      Pharynx: Oropharynx is clear. Eyes:      General: No scleral icterus. Right eye: No discharge. Left eye: No discharge. Extraocular Movements: Extraocular movements intact. Neck:      Trachea: No tracheal deviation. Cardiovascular:      Rate and Rhythm: Regular rhythm. Tachycardia present. Pulses: Normal pulses. Heart sounds: No murmur heard. Pulmonary:      Effort: Tachypnea and accessory muscle usage present. No bradypnea or respiratory distress. He is not intubated. Breath sounds: No stridor. Examination of the right-lower field reveals rales. Examination of the left-lower field reveals rales. Rales present. Chest:      Chest wall: No mass, deformity, tenderness, crepitus or edema. Abdominal:      Palpations: Abdomen is soft. There is no hepatomegaly, splenomegaly or mass. Tenderness: There is no abdominal tenderness. There is no guarding or rebound. Musculoskeletal:         General: Normal range of motion. Cervical back: Normal range of motion and neck supple. Right lower leg: No tenderness. Edema (1+) present. Left lower leg: No tenderness. Edema (1+) present. Skin:     General: Skin is warm. Capillary Refill: Capillary refill takes less than 2 seconds. Coloration: Skin is not cyanotic, jaundiced or pale. Findings: No bruising, ecchymosis, erythema, lesion, petechiae or rash. Nails: There is no clubbing. Neurological:      General: No focal deficit present. Mental Status: He is alert and oriented to person, place, and time. Mental status is at baseline. Psychiatric:         Mood and Affect: Mood is anxious.          Behavior: Behavior normal.         ED Medications  Medications   metoprolol succinate (TOPROL-XL) 24 hr tablet 25 mg (25 mg Oral Given 9/1/23 0630)   nicotine (NICODERM CQ) 14 mg/24hr TD 24 hr patch 1 patch (has no administration in time range)   enoxaparin (LOVENOX) subcutaneous injection 40 mg (has no administration in time range)   furosemide (LASIX) injection 40 mg (has no administration in time range)   nitroglycerin (NITROSTAT) SL tablet 0.4 mg (has no administration in time range)   nitroglycerin (NITRO-BID) 2 % TD ointment 0.5 inch (0.5 inches Topical Given 9/1/23 8299)   labetalol (NORMODYNE) injection 20 mg (20 mg Intravenous Given 9/1/23 0440)   furosemide (LASIX) injection 20 mg (20 mg Intravenous Given 9/1/23 0445)   potassium chloride (K-DUR,KLOR-CON) CR tablet 40 mEq (40 mEq Oral Given 9/1/23 0630)   furosemide (LASIX) injection 20 mg (20 mg Intravenous Given 9/1/23 0630)       Diagnostic Studies  Results Reviewed     Procedure Component Value Units Date/Time    CBC and differential [[id number]]     Lab Status: No result Specimen: Blood     Magnesium [[id number]]     Lab Status: No result Specimen: Blood     FLU/RSV/COVID - if FLU/RSV clinically relevant [[id number]]  (Normal) Collected: 09/01/23 0344    Lab Status: Final result Specimen: Nares from Nose Updated: 09/01/23 0455     SARS-CoV-2 Negative     INFLUENZA A PCR Negative     INFLUENZA B PCR Negative     RSV PCR Negative    Narrative:      FOR PEDIATRIC PATIENTS - copy/paste COVID Guidelines URL to browser: [email or web address]    SARS-CoV-2 assay is a Nucleic Acid Amplification assay intended for the  qualitative detection of nucleic acid from SARS-CoV-2 in nasopharyngeal  swabs. Results are for the presumptive identification of SARS-CoV-2 RNA. Positive results are indicative of infection with SARS-CoV-2, the virus  causing COVID-19, but do not rule out bacterial infection or co-infection  with other viruses. Laboratories within the [facility] and its  territories are required to report all positive results to the appropriate  public health authorities. Negative results do not preclude SARS-CoV-2  infection and should not be used as the sole basis for treatment or other  patient management decisions. Negative results must be combined with  clinical observations, patient history, and epidemiological information. This test has not been FDA cleared or approved. This test has been authorized by FDA under an Emergency Use Authorization  (EUA).  This test is only authorized for the duration of time the  declaration that circumstances exist justifying the authorization of the  emergency use of an in vitro diagnostic tests for detection of SARS-CoV-2  virus and/or diagnosis of COVID-19 infection under section 564(b)(1) of  the Act, 21 U. S.C. 060AHN-8(T)(0), unless the authorization is terminated  or revoked sooner. The test has been validated but independent review by FDA  and CLIA is pending. Test performed using [employer] GeneXpert: This RT-PCR assay targets N2,  a region unique to SARS-CoV-2. A conserved region in the E-gene was chosen  for pan-Sarbecovirus detection which includes SARS-CoV-2. According to CMS-2020-01-R, this platform meets the definition of high-throughput technology.     B-Type Natriuretic Peptide(BNP) [[id number]]  (Abnormal) Collected: 09/01/23 0344    Lab Status: Final result Specimen: Blood from Arm, Right Updated: 09/01/23 0431      pg/mL     Urine Microscopic [[id number]]  (Abnormal) Collected: 09/01/23 0410    Lab Status: Final result Specimen: Urine, Clean Catch Updated: 09/01/23 0421     RBC, UA None Seen /hpf      WBC, UA 2-4 /hpf      Epithelial Cells None Seen /hpf      Bacteria, UA None Seen /hpf      MUCUS THREADS Occasional    UA w Reflex to Microscopic w Reflex to Culture [[id number]]  (Abnormal) Collected: 09/01/23 0410    Lab Status: Final result Specimen: Urine, Clean Catch Updated: 09/01/23 0420     Color, UA Light Yellow     Clarity, UA Clear     Specific Gravity, UA 1.021     pH, UA 6.0     Leukocytes, UA Negative     Nitrite, UA Negative     Protein, UA Trace mg/dl      Glucose, UA Negative mg/dl      Ketones, UA Negative mg/dl      Urobilinogen, UA <2.0 mg/dl      Bilirubin, UA Negative     Occult Blood, UA Negative    HS Troponin 0hr (reflex protocol) [[id number]]  (Normal) Collected: 09/01/23 0344    Lab Status: Final result Specimen: Blood from Arm, Right Updated: 09/01/23 0415     hs TnI 0hr 21 ng/L     HS Troponin I 2hr [[id number]]     Lab Status: No result Specimen: Blood     HS Troponin I 4hr [[id number]]     Lab Status: No result Specimen: Blood     Comprehensive metabolic panel [[id number]]  (Abnormal) Collected: 09/01/23 0344    Lab Status: Final result Specimen: Blood from Arm, Right Updated: 09/01/23 0406     Sodium 139 mmol/L      Potassium 3.7 mmol/L      Chloride 108 mmol/L      CO2 23 mmol/L      ANION GAP 8 mmol/L      BUN 14 mg/dL      Creatinine 1.38 mg/dL      Glucose 109 mg/dL      Calcium 9.0 mg/dL      AST 44 U/L      ALT 32 U/L      Alkaline Phosphatase 75 U/L      Total Protein 7.4 g/dL      Albumin 4.4 g/dL      Total Bilirubin 0.66 mg/dL      eGFR 62 ml/min/1.73sq m     Narrative:      [place] guidelines for Chronic Kidney Disease (CKD):   •  Stage 1 with normal or high GFR (GFR > 90 mL/min/1.73 square meters)  •  Stage 2 Mild CKD (GFR = 60-89 mL/min/1.73 square meters)  •  Stage 3A Moderate CKD (GFR = 45-59 mL/min/1.73 square meters)  •  Stage 3B Moderate CKD (GFR = 30-44 mL/min/1.73 square meters)  •  Stage 4 Severe CKD (GFR = 15-29 mL/min/1.73 square meters)  •  Stage 5 End Stage CKD (GFR <15 mL/min/1.73 square meters)  Note: GFR calculation is accurate only with a steady state creatinine    Magnesium [[id number]]  (Normal) Collected: 09/01/23 0344    Lab Status: Final result Specimen: Blood from Arm, Right Updated: 09/01/23 0406     Magnesium 2.2 mg/dL     Lactic acid, plasma (w/reflex if result > 2.0) [[id number]]  (Normal) Collected: 09/01/23 0344    Lab Status: Final result Specimen: Blood from Arm, Right Updated: 09/01/23 0406     LACTIC ACID 1.2 mmol/L     Narrative:      Result may be elevated if tourniquet was used during collection.     CBC and differential [[id number]]  (Abnormal) Collected: 09/01/23 0344    Lab Status: Final result Specimen: Blood from Arm, Right Updated: 09/01/23 0351     WBC 9.12 Thousand/uL      RBC 4.16 Million/uL      Hemoglobin 13.2 g/dL      Hematocrit 41.9 %       fL      MCH 31.7 pg      MCHC 31.5 g/dL      RDW 12.4 %      MPV 11.2 fL      Platelets 200 Thousands/uL      nRBC 0 /100 WBCs      Neutrophils Relative 71 %      Immat GRANS % 0 %      Lymphocytes Relative 20 %      Monocytes Relative 7 %      Eosinophils Relative 1 %      Basophils Relative 1 %      Neutrophils Absolute 6.53 Thousands/µL      Immature Grans Absolute 0.02 Thousand/uL      Lymphocytes Absolute 1.80 Thousands/µL      Monocytes Absolute 0.61 Thousand/µL      Eosinophils Absolute 0.11 Thousand/µL      Basophils Absolute 0.05 Thousands/µL     Blood gas, venous [[id number]]  (Abnormal) Collected: 09/01/23 0344    Lab Status: Final result Specimen: Blood from Arm, Right Updated: 09/01/23 0351     pH, Asim 7.433     pCO2, Asim 36.2 mm Hg      pO2, Asim 107.1 mm Hg      HCO3, Asim 23.7 mmol/L      Base Excess, Asim -0.2 mmol/L      O2 Content, Asim 19.5 ml/dL      O2 HGB, VENOUS 94.9 %                  XR chest 1 view portable   ED Interpretation by Darryl Watkins MD (09/01 0405)   Diffuse alveolar infiltrates likely 2/2 pulmonary edema            Procedures  Procedures      ED Course  ED Course as of 09/01/23 0710   Fri Sep 01, 2023   0348 EKG personally interpreted by me sinus tachycardia rate of 110, normal MS interval, normal QRS interval, QTc 495, left axis, no ST elevations, no ST depressions, no STEMI, no previous EKG for comparison. 0507 Reached out to internal medicine team regarding admission                                       Medical Decision Making  80-year-old male presenting with shortness of breath and cough that are exacerbated by lying flat or sleeping. Long history of noncompliance with hypertensive medications. Differential includes but not limited to ACS, CHF exacerbation, renal failure, liver failure    Patient placed on 2 L nasal cannula due to pulse ox in the low 90s. Patient diaphoretic on exam with increased work of breathing and retractions, bibasilar Rales. High high suspicion for heart failure at this time. Started on Nitropaste and given Lasix and labetalol. Kidney function noted to be at baseline on labs. Normal LFTs. BNP in the 700s. EKG without STEMI, initial troponin 21.   Improvement after medication therapy in the emergency department. Patient will benefit from admission for further management and evaluation of CHF exacerbation with new O2 requirement. Discussed case with internal medicine team and in agreement with plan. Amount and/or Complexity of Data Reviewed  Labs: ordered. Radiology: ordered and independent interpretation performed. Risk  Prescription drug management. Disposition  Final diagnoses:   Acute exacerbation of CHF (congestive heart failure) ([address])   Orthopnea     Time reflects when diagnosis was documented in both MDM as applicable and the Disposition within this note     Time User Action Codes Description Comment    9/1/2023  5:31 AM Eliazar Resendiz Add [I50.9] Acute heart failure ([address])     9/1/2023  6:20 AM Hector Mccarthy Add [I50.9] Acute exacerbation of CHF (congestive heart failure) ([address])     9/1/2023  6:20 AM Qamar Mccarthy Add [R06.01] Orthopnea     9/1/2023  6:21 AM Maximiliano Mccarthy Modify [I50.9] Acute heart failure ([address])     9/1/2023  6:21 AM Maximiliano Mccarthy Modify [I50.9] Acute exacerbation of CHF (congestive heart failure) ([address])       ED Disposition     None      Follow-up Information    None         Current Discharge Medication List      CONTINUE these medications which have NOT CHANGED    Details   amLODIPine (NORVASC) 10 mg tablet TAKE 1 TABLET BY MOUTH EVERY DAY  Qty: 90 tablet, Refills: 1    Associated Diagnoses: Essential hypertension      hydrochlorothiazide (HYDRODIURIL) 25 mg tablet TAKE 1 TABLET (25 MG TOTAL) BY MOUTH DAILY. Qty: 90 tablet, Refills: 1    Associated Diagnoses: Benign essential hypertension      metoprolol succinate (TOPROL-XL) 25 mg 24 hr tablet TAKE 1 TABLET (25 MG TOTAL) BY MOUTH DAILY. Qty: 90 tablet, Refills: 1    Associated Diagnoses: Essential hypertension           No discharge procedures on file. PDMP Review     None           ED Provider  Attending physically available and evaluated Adam Dennison.  I managed the patient along with the ED Attending.     Electronically Signed by         Ruddy Blevins MD  09/01/23 7610

## 2023-09-01 NOTE — CONSULTS
Instructed patient on Heart Failure Nutrition therapy from AND diet manual. Previously he was trying to lose wt following Keto Diet. Advised to follow the information provided and not follow fad diet.  Verbalized understanding and interest in compliance

## 2023-09-01 NOTE — CONSULTS
Consultation - Cardiology Team One  Roxanne Talamantes 43 y.o. male MRN: [id number]  Unit/Bed#: S -01 Encounter: [id number]    Inpatient consult to Cardiology  Consult performed by: JESSICA Zhang  Consult ordered by: Amy Snell MD          Physician Requesting Consult: Huyen Hebert MD     Reason for Consult / Principal Problem: CHF      Assessment/ Plan:     1. Hypertensive Urgency: /126 on presentation; improved with Nitropaste and IV labetalol; most recent 159/105  Has hx of htn; has been off meds for over a year due to lack fo insurance, now has medicaid  - previously on metoprolol succinate 25mg daily, HCTZ 25mg daily and Norvasc 10mg daily  Agree with restarting BB and Norvasc; getting IV diuresis for #1  2. Acute CHF/flash pulmonary edema: pt presented with two episodse of failryl caute onset SOB. BP very elevated upon admission. + vascular congestion on cxray  BNP elevated  Likely due to hypertensive urgency; has been off all meds  Agree with IV diuresis; he has responded well thus far from 40mg IV lasix and would continue 40g IV BID  - strict I/os, daily wts, low Na diet  - repeat BMP in AM  Check echo to eval EF, wall motion; has risk factors for both CAD and cardiomyopathy with HTN, smoking/polysubstance abuse and family hx. 3. CKD Stage 3: Cr 1.38; repeat in AM  4. Polysubstance abuse: current 1/2 ppd smoker for many years, smokes marijuana daily, drinks one pint of cognac daily; denies any w/d symptoms at this time. Last drink 2 days ago. Advised to notify RN if any arise; cessation of all discussed and advised. He has nicotine patch in place now. History of Present Illness      HPI: Roxanne Talamantes is a 43y.o. year old male who has a history of hypertension, CKD stage III, obesity and current tobacco use. He does not follow with cardiologist.    Patient presented to the emergency department today with complaints of shortness of breath and orthopnea.   He has been feeling in his normal state of health until 2 nights ago he awoke from sleep with SOB. Could not lay flat. No chest pain. He stayed up the rest of the night and felt somewhat better in the morning and was actually able to go to the gym and do his lifting as he normally does without any significant symptoms. He went to bed that same night and woke up at approximately 2 AM with the same symptoms of significant shortness of breath and orthopnea. This prompted him to come to the emergency department. On presentation EKG showed sinus tachycardia with lateral T wave inversions (seen on prior EKG 2021) he was hypertensive with a blood pressure of 195/126, afebrile, SPO2 98% on 2 L. Chest x-ray showed mild cardiomegaly and vascular congestion  Labs are significant for creatinine 1.38, sodium 139, potassium 3.7 and mag 2.2. NT proBNP 713  High-sensitivity troponin negative x2  CBC unremarkable    Patient was felt to be in acute heart failure, no prior history of this. He was given Lasix 20 mg IV in the emergency department and started on 40 mg IV twice daily. Given labetalol 20 mg IV for elevated blood pressure as well as nitro paste. echocardiogram has been ordered and cardiology was asked to see for further management. At time my exam patient reports feeling somewhat better. He has urinated a lot since getting the Lasix. He denies any prior history of CHF, CAD or MI. He does report a strong family history of hypertension and his mother had heart failure as well. He carries a personal history of hypertension, has been off his medications for at least a year due to lack of insurance and follow-up prior antihypertensive medications included amlodipine 10 mg daily, HCTZ 25 mg daily and metoprolol succinate 25 mg daily. He has not been taking medications for at least a year and has not had his blood pressure checked in that same time period.     He is a longtime half a pack a day smoker, currently still smoking. He smokes marijuana daily and drinks a pint of cognac daily; last drink 2 days ago. 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 EKG reviewed personally:  9/1/2023  Sinus rhythm  Lateral twave inversions; seen on prior OP ekg 2021    Telemetry reviewed personally:   Sinus rhythm, no significant events    Review of Systems   Constitutional: Negative for decreased appetite and fever. Cardiovascular: Positive for chest pain, dyspnea on exertion and orthopnea. Negative for irregular heartbeat, leg swelling, near-syncope, palpitations and syncope. Respiratory: Negative for cough and shortness of breath. Gastrointestinal: Negative for nausea and vomiting. Genitourinary: Negative for dysuria. Neurological: Negative for dizziness and light-headedness. Psychiatric/Behavioral: Negative for altered mental status. All other systems reviewed and are negative.      Historical Information   Past Medical History:   Diagnosis Date   • Hypertension    • Morbid obesity with BMI of 40.0-44.9, adult ([facility])      Past Surgical History:   Procedure Laterality Date   • BACK SURGERY     • FINGER SURGERY Right    • KNEE SURGERY Right    • SHOULDER ARTHROPLASTY Right      Social History     Substance and Sexual Activity   Alcohol Use Yes     Social History     Substance and Sexual Activity   Drug Use Yes   • Types: Marijuana     Social History     Tobacco Use   Smoking Status Every Day   • Packs/day: 0.25   • Types: Cigarettes   Smokeless Tobacco Never     Family History:   Family History   Problem Relation Age of Onset   • Heart disease Mother    • Hypertension Mother    • No Known Problems Father      Meds/Allergies   current meds:   Current Facility-Administered Medications   Medication Dose Route Frequency   • enoxaparin (LOVENOX) subcutaneous injection 40 mg  40 mg Subcutaneous BID   • furosemide (LASIX) injection 40 mg  40 mg Intravenous BID (diuretic)   • metoprolol succinate (TOPROL-XL) 24 hr tablet 25 mg  25 mg Oral Daily   • nicotine (NICODERM CQ) 14 mg/24hr TD 24 hr patch 1 patch  1 patch Transdermal Daily   • nitroglycerin (NITROSTAT) SL tablet 0.4 mg  0.4 mg Sublingual Q5 Min PRN     No Known Allergies    Objective   Vitals: Blood pressure (!) 159/105, pulse 90, temperature 98.3 °F (36.8 °C), resp. rate 20, height 6' 3" (1.905 m), weight (!) 146 kg (322 lb 1.5 oz), SpO2 91 %.,     Body mass index is 40.26 kg/m². ,     Systolic (32DBQ), REU:392 , Min:159 , ZCC:699     Diastolic (98WGK), IDM:916, Min:105, Max:126    No intake or output data in the 24 hours ending 09/01/23 1159  Weight (last 2 days)     Date/Time Weight    09/01/23 0329 146 (322.09)        Invasive Devices     Peripheral Intravenous Line  Duration           Peripheral IV 09/01/23 Right;Ventral (anterior) Forearm <1 day              Physical Exam  Vitals reviewed. Constitutional:       General: He is not in acute distress. Appearance: Normal appearance. He is obese. Comments: Patient lying nearly flat in bed in NAD alert and cooperative with exam   HENT:      Head: Normocephalic. Mouth/Throat:      Mouth: Mucous membranes are moist.   Cardiovascular:      Rate and Rhythm: Normal rate and regular rhythm. Heart sounds: S1 normal and S2 normal. No murmur heard. Pulmonary:      Effort: Pulmonary effort is normal.      Breath sounds: Rales present. Comments: On o2 via NC  Abdominal:      Palpations: Abdomen is soft. Musculoskeletal:      Right lower leg: No edema. Left lower leg: No edema. Skin:     General: Skin is warm. Neurological:      Mental Status: He is alert and oriented to person, place, and time.    Psychiatric:         Mood and Affect: Mood normal.       LABORATORY RESULTS:      CBC with diff:   Results from last 7 days   Lab Units 09/01/23  0344   WBC Thousand/uL 9.12   HEMOGLOBIN g/dL 13.2   HEMATOCRIT % 41.9   MCV fL 101*   PLATELETS Thousands/uL 189   RBC Million/uL 4.16   MCH pg 31.7   MCHC g/dL 31.5   RDW % 12.4   MPV fL 11.2   NRBC AUTO /100 WBCs 0     CMP:  Results from last 7 days   Lab Units 09/01/23  0344   POTASSIUM mmol/L 3.7   CHLORIDE mmol/L 108   CO2 mmol/L 23   BUN mg/dL 14   CREATININE mg/dL 1.38*   CALCIUM mg/dL 9.0   AST U/L 44*   ALT U/L 32   ALK PHOS U/L 75   EGFR ml/min/1.73sq m 62     BMP:  Results from last 7 days   Lab Units 09/01/23  0344   POTASSIUM mmol/L 3.7   CHLORIDE mmol/L 108   CO2 mmol/L 23   BUN mg/dL 14   CREATININE mg/dL 1.38*   CALCIUM mg/dL 9.0      Results from last 7 days   Lab Units 09/01/23  0344   MAGNESIUM mg/dL 2.2         Lipid Profile:   No results found for: "CHOL"  Lab Results   Component Value Date    HDL 40 06/23/2021     Lab Results   Component Value Date    LDLCALC 136 (H) 06/23/2021     Lab Results   Component Value Date    TRIG 252 (H) 06/23/2021     Imaging: I have personally reviewed pertinent reports. XR chest 1 view portable    Result Date: 9/1/2023  Narrative: CHEST INDICATION:   sob. COMPARISON:  None EXAM PERFORMED/VIEWS:  XR CHEST PORTABLE FINDINGS: Mild fairly diffuse bilateral opacity, likely vascular congestion Cardiomediastinal silhouette is mildly enlarged No pneumothorax or pleural effusion. Osseous structures appear within normal limits for patient age. Impression: Mild cardiomegaly and probable vascular congestion Workstation performed: [id number]     Assessment  Principal Problem:    Acute heart failure (HCC)  Active Problems:    Stage 3 chronic kidney disease ([address] )    Essential hypertension    Morbid obesity with BMI of 40.0-44.9, adult ([facility])    Thank you for allowing us to participate in this patient's care. Counseling / Coordination of Care  Total floor / unit time spent today 45 minutes. Greater than 50% of total time was spent with the patient and / or family counseling and / or coordination of care.   A description of the counseling / coordination of care: Review of history, current assessment, development of a plan. Code Status: Level 1 - Full Code    ** Please Note: Dragon 360 Dictation voice to text software may have been used in the creation of this document.  **

## 2023-09-01 NOTE — ASSESSMENT & PLAN NOTE
Patient previously used to take amlodipine, hydrochlorothiazide, metoprolol however he has lost insurance  Currently on Medicaid  Wants to speak to   Restart metoprolol succinate, hydrochlorothiazide

## 2023-09-02 LAB
ALBUMIN SERPL BCP-MCNC: 4.2 G/DL (ref 3.5–5)
ALP SERPL-CCNC: 70 U/L (ref 34–104)
ALT SERPL W P-5'-P-CCNC: 21 U/L (ref 7–52)
ANION GAP SERPL CALCULATED.3IONS-SCNC: 10 MMOL/L
ANION GAP SERPL CALCULATED.3IONS-SCNC: 11 MMOL/L
AST SERPL W P-5'-P-CCNC: 24 U/L (ref 13–39)
ATRIAL RATE: 110 BPM
ATRIAL RATE: 90 BPM
ATRIAL RATE: 90 BPM
BASOPHILS # BLD AUTO: 0.05 THOUSANDS/ÂΜL (ref 0–0.1)
BASOPHILS NFR BLD AUTO: 1 % (ref 0–1)
BILIRUB SERPL-MCNC: 1.29 MG/DL (ref 0.2–1)
BUN SERPL-MCNC: 11 MG/DL (ref 5–25)
BUN SERPL-MCNC: 12 MG/DL (ref 5–25)
CALCIUM SERPL-MCNC: 9 MG/DL (ref 8.4–10.2)
CALCIUM SERPL-MCNC: 9.8 MG/DL (ref 8.4–10.2)
CHLORIDE SERPL-SCNC: 100 MMOL/L (ref 96–108)
CHLORIDE SERPL-SCNC: 102 MMOL/L (ref 96–108)
CO2 SERPL-SCNC: 27 MMOL/L (ref 21–32)
CO2 SERPL-SCNC: 30 MMOL/L (ref 21–32)
CREAT SERPL-MCNC: 1.33 MG/DL (ref 0.6–1.3)
CREAT SERPL-MCNC: 1.39 MG/DL (ref 0.6–1.3)
EOSINOPHIL # BLD AUTO: 0.07 THOUSAND/ÂΜL (ref 0–0.61)
EOSINOPHIL NFR BLD AUTO: 1 % (ref 0–6)
ERYTHROCYTE [DISTWIDTH] IN BLOOD BY AUTOMATED COUNT: 12.2 % (ref 11.6–15.1)
GFR SERPL CREATININE-BSD FRML MDRD: 62 ML/MIN/1.73SQ M
GFR SERPL CREATININE-BSD FRML MDRD: 65 ML/MIN/1.73SQ M
GLUCOSE SERPL-MCNC: 104 MG/DL (ref 65–140)
GLUCOSE SERPL-MCNC: 104 MG/DL (ref 65–140)
HCT VFR BLD AUTO: 44.8 % (ref 36.5–49.3)
HGB BLD-MCNC: 14.4 G/DL (ref 12–17)
IMM GRANULOCYTES # BLD AUTO: 0.01 THOUSAND/UL (ref 0–0.2)
IMM GRANULOCYTES NFR BLD AUTO: 0 % (ref 0–2)
LYMPHOCYTES # BLD AUTO: 1.45 THOUSANDS/ÂΜL (ref 0.6–4.47)
LYMPHOCYTES NFR BLD AUTO: 19 % (ref 14–44)
MAGNESIUM SERPL-MCNC: 2.1 MG/DL (ref 1.9–2.7)
MCH RBC QN AUTO: 31.9 PG (ref 26.8–34.3)
MCHC RBC AUTO-ENTMCNC: 32.1 G/DL (ref 31.4–37.4)
MCV RBC AUTO: 99 FL (ref 82–98)
MONOCYTES # BLD AUTO: 0.6 THOUSAND/ÂΜL (ref 0.17–1.22)
MONOCYTES NFR BLD AUTO: 8 % (ref 4–12)
NEUTROPHILS # BLD AUTO: 5.6 THOUSANDS/ÂΜL (ref 1.85–7.62)
NEUTS SEG NFR BLD AUTO: 71 % (ref 43–75)
NRBC BLD AUTO-RTO: 0 /100 WBCS
P AXIS: 61 DEGREES
P AXIS: 65 DEGREES
P AXIS: 67 DEGREES
PHOSPHATE SERPL-MCNC: 3 MG/DL (ref 2.7–4.5)
PLATELET # BLD AUTO: 191 THOUSANDS/UL (ref 149–390)
PMV BLD AUTO: 11.8 FL (ref 8.9–12.7)
POTASSIUM SERPL-SCNC: 3 MMOL/L (ref 3.5–5.3)
POTASSIUM SERPL-SCNC: 3.6 MMOL/L (ref 3.5–5.3)
PR INTERVAL: 142 MS
PR INTERVAL: 144 MS
PR INTERVAL: 150 MS
PROT SERPL-MCNC: 7.3 G/DL (ref 6.4–8.4)
QRS AXIS: -11 DEGREES
QRS AXIS: -39 DEGREES
QRS AXIS: -9 DEGREES
QRSD INTERVAL: 84 MS
QRSD INTERVAL: 88 MS
QRSD INTERVAL: 88 MS
QT INTERVAL: 366 MS
QT INTERVAL: 372 MS
QT INTERVAL: 390 MS
QTC INTERVAL: 455 MS
QTC INTERVAL: 477 MS
QTC INTERVAL: 495 MS
RBC # BLD AUTO: 4.51 MILLION/UL (ref 3.88–5.62)
SODIUM SERPL-SCNC: 140 MMOL/L (ref 135–147)
SODIUM SERPL-SCNC: 140 MMOL/L (ref 135–147)
T WAVE AXIS: -8 DEGREES
T WAVE AXIS: 0 DEGREES
T WAVE AXIS: 53 DEGREES
VENTRICULAR RATE: 110 BPM
VENTRICULAR RATE: 90 BPM
VENTRICULAR RATE: 90 BPM
WBC # BLD AUTO: 7.78 THOUSAND/UL (ref 4.31–10.16)

## 2023-09-02 PROCEDURE — 80048 BASIC METABOLIC PNL TOTAL CA: CPT

## 2023-09-02 PROCEDURE — 93010 ELECTROCARDIOGRAM REPORT: CPT | Performed by: INTERNAL MEDICINE

## 2023-09-02 PROCEDURE — 99232 SBSQ HOSP IP/OBS MODERATE 35: CPT | Performed by: INTERNAL MEDICINE

## 2023-09-02 PROCEDURE — 85025 COMPLETE CBC W/AUTO DIFF WBC: CPT

## 2023-09-02 PROCEDURE — 83735 ASSAY OF MAGNESIUM: CPT

## 2023-09-02 PROCEDURE — 80053 COMPREHEN METABOLIC PANEL: CPT

## 2023-09-02 PROCEDURE — 84100 ASSAY OF PHOSPHORUS: CPT

## 2023-09-02 RX ORDER — LOSARTAN POTASSIUM 50 MG/1
50 TABLET ORAL DAILY
Status: DISCONTINUED | OUTPATIENT
Start: 2023-09-02 | End: 2023-09-04

## 2023-09-02 RX ORDER — AMLODIPINE BESYLATE 5 MG/1
5 TABLET ORAL DAILY
Status: DISCONTINUED | OUTPATIENT
Start: 2023-09-02 | End: 2023-09-02

## 2023-09-02 RX ORDER — AMLODIPINE BESYLATE 10 MG/1
10 TABLET ORAL DAILY
Status: DISCONTINUED | OUTPATIENT
Start: 2023-09-02 | End: 2023-09-02

## 2023-09-02 RX ORDER — POTASSIUM CHLORIDE 20 MEQ/1
40 TABLET, EXTENDED RELEASE ORAL ONCE
Status: COMPLETED | OUTPATIENT
Start: 2023-09-02 | End: 2023-09-02

## 2023-09-02 RX ORDER — METOPROLOL SUCCINATE 25 MG/1
25 TABLET, EXTENDED RELEASE ORAL 2 TIMES DAILY
Status: DISCONTINUED | OUTPATIENT
Start: 2023-09-02 | End: 2023-09-04

## 2023-09-02 RX ORDER — AMLODIPINE BESYLATE 5 MG/1
5 TABLET ORAL 2 TIMES DAILY
Status: DISCONTINUED | OUTPATIENT
Start: 2023-09-02 | End: 2023-09-04 | Stop reason: HOSPADM

## 2023-09-02 RX ADMIN — POTASSIUM CHLORIDE 40 MEQ: 1500 TABLET, EXTENDED RELEASE ORAL at 14:56

## 2023-09-02 RX ADMIN — ENOXAPARIN SODIUM 40 MG: 40 INJECTION SUBCUTANEOUS at 17:14

## 2023-09-02 RX ADMIN — POTASSIUM CHLORIDE 40 MEQ: 1500 TABLET, EXTENDED RELEASE ORAL at 09:48

## 2023-09-02 RX ADMIN — FUROSEMIDE 40 MG: 10 INJECTION, SOLUTION INTRAMUSCULAR; INTRAVENOUS at 14:56

## 2023-09-02 RX ADMIN — ENOXAPARIN SODIUM 40 MG: 40 INJECTION SUBCUTANEOUS at 08:25

## 2023-09-02 RX ADMIN — FUROSEMIDE 40 MG: 10 INJECTION, SOLUTION INTRAMUSCULAR; INTRAVENOUS at 08:25

## 2023-09-02 RX ADMIN — AMLODIPINE BESYLATE 5 MG: 5 TABLET ORAL at 08:25

## 2023-09-02 RX ADMIN — LOSARTAN POTASSIUM 50 MG: 50 TABLET, FILM COATED ORAL at 08:25

## 2023-09-02 RX ADMIN — POTASSIUM CHLORIDE 40 MEQ: 1500 TABLET, EXTENDED RELEASE ORAL at 08:32

## 2023-09-02 RX ADMIN — METOPROLOL SUCCINATE 25 MG: 25 TABLET, EXTENDED RELEASE ORAL at 21:08

## 2023-09-02 RX ADMIN — NICOTINE 1 PATCH: 14 PATCH, EXTENDED RELEASE TRANSDERMAL at 08:25

## 2023-09-02 RX ADMIN — METOPROLOL SUCCINATE 25 MG: 25 TABLET, EXTENDED RELEASE ORAL at 08:25

## 2023-09-02 RX ADMIN — AMLODIPINE BESYLATE 5 MG: 5 TABLET ORAL at 21:08

## 2023-09-02 NOTE — PLAN OF CARE
Problem: PAIN - ADULT  Goal: Verbalizes/displays adequate comfort level or baseline comfort level  Description: Interventions:  - Encourage patient to monitor pain and request assistance  - Assess pain using appropriate pain scale  - Administer analgesics based on type and severity of pain and evaluate response  - Implement non-pharmacological measures as appropriate and evaluate response  - Consider cultural and social influences on pain and pain management  - Notify physician/advanced practitioner if interventions unsuccessful or patient reports new pain  Outcome: Progressing     Problem: INFECTION - ADULT  Goal: Absence or prevention of progression during hospitalization  Description: INTERVENTIONS:  - Assess and monitor for signs and symptoms of infection  - Monitor lab/diagnostic results  - Monitor all insertion sites, i.e. indwelling lines, tubes, and drains  - Monitor endotracheal if appropriate and nasal secretions for changes in amount and color  - [place] appropriate cooling/warming therapies per order  - Administer medications as ordered  - Instruct and encourage patient and family to use good hand hygiene technique  - Identify and instruct in appropriate isolation precautions for identified infection/condition  Outcome: Progressing  Goal: Absence of fever/infection during neutropenic period  Description: INTERVENTIONS:  - Monitor WBC    Outcome: Progressing     Problem: SAFETY ADULT  Goal: Patient will remain free of falls  Description: INTERVENTIONS:  - Educate patient/family on patient safety including physical limitations  - Instruct patient to call for assistance with activity   - Consult OT/PT to assist with strengthening/mobility   - Keep Call bell within reach  - Keep bed low and locked with side rails adjusted as appropriate  - Keep care items and personal belongings within reach  - Initiate and maintain comfort rounds  - Make Fall Risk Sign visible to staff  - Offer Toileting every  Hours, in advance of need  - Initiate/Maintain alarm  - Obtain necessary fall risk management equipment:   - Apply yellow socks and bracelet for high fall risk patients  - Consider moving patient to room near nurses station  Outcome: Progressing  Goal: Maintain or return to baseline ADL function  Description: INTERVENTIONS:  -  Assess patient's ability to carry out ADLs; assess patient's baseline for ADL function and identify physical deficits which impact ability to perform ADLs (bathing, care of mouth/teeth, toileting, grooming, dressing, etc.)  - Assess/evaluate cause of self-care deficits   - Assess range of motion  - Assess patient's mobility; develop plan if impaired  - Assess patient's need for assistive devices and provide as appropriate  - Encourage maximum independence but intervene and supervise when necessary  - Involve family in performance of ADLs  - Assess for home care needs following discharge   - Consider OT consult to assist with ADL evaluation and planning for discharge  - Provide patient education as appropriate  Outcome: Progressing  Goal: Maintains/Returns to pre admission functional level  Description: INTERVENTIONS:  - Perform BMAT or MOVE assessment daily.   - Set and communicate daily mobility goal to care team and patient/family/caregiver. - Collaborate with rehabilitation services on mobility goals if consulted  - Perform Range of Motion  times a day. - Reposition patient every  hours.   - Dangle patient  times a day  - Stand patient  times a day  - Ambulate patient  times a day  - Out of bed to chair  times a day   - Out of bed for meals  times a day  - Out of bed for toileting  - Record patient progress and toleration of activity level   Outcome: Progressing     Problem: DISCHARGE PLANNING  Goal: Discharge to home or other facility with appropriate resources  Description: INTERVENTIONS:  - Identify barriers to discharge w/patient and caregiver  - Arrange for needed discharge resources and transportation as appropriate  - Identify discharge learning needs (meds, wound care, etc.)  - Arrange for interpretive services to assist at discharge as needed  - Refer to Case Management Department for coordinating discharge planning if the patient needs post-hospital services based on physician/advanced practitioner order or complex needs related to functional status, cognitive ability, or social support system  Outcome: Progressing     Problem: Knowledge Deficit  Goal: Patient/family/caregiver demonstrates understanding of disease process, treatment plan, medications, and discharge instructions  Description: Complete learning assessment and assess knowledge base. Interventions:  - Provide teaching at level of understanding  - Provide teaching via preferred learning methods  Outcome: Progressing     Problem: Nutrition/Hydration-ADULT  Goal: Nutrient/Hydration intake appropriate for improving, restoring or maintaining nutritional needs  Description: Monitor and assess patient's nutrition/hydration status for malnutrition. Collaborate with interdisciplinary team and initiate plan and interventions as ordered. Monitor patient's weight and dietary intake as ordered or per policy. Utilize nutrition screening tool and intervene as necessary. Determine patient's food preferences and provide high-protein, high-caloric foods as appropriate.      INTERVENTIONS:  - Monitor oral intake, urinary output, labs, and treatment plans  - Assess nutrition and hydration status and recommend course of action  - Evaluate amount of meals eaten  - Assist patient with eating if necessary   - Allow adequate time for meals  - Recommend/ encourage appropriate diets, oral nutritional supplements, and vitamin/mineral supplements  - Order, calculate, and assess calorie counts as needed  - Recommend, monitor, and adjust tube feedings and TPN/PPN based on assessed needs  - Assess need for intravenous fluids  - Provide specific nutrition/hydration education as appropriate  - Include patient/family/caregiver in decisions related to nutrition  Outcome: Progressing

## 2023-09-02 NOTE — ASSESSMENT & PLAN NOTE
Lab Results   Component Value Date    EGFR 65 09/02/2023    EGFR 62 09/01/2023    EGFR 70 08/11/2021    CREATININE 1.33 (H) 09/02/2023    CREATININE 1.38 (H) 09/01/2023    CREATININE 1.43 (H) 08/11/2021   Creatinine 1.30-1.4, currently at baseline  Continue to monitor

## 2023-09-02 NOTE — ASSESSMENT & PLAN NOTE
Patient previously used to take amlodipine, hydrochlorothiazide, metoprolol however he has lost insurance  Currently on Medicaid  Wants to speak to     Plan:  Cardiology following  Restart Toprol-XL 25 mg BID, amlodipine 5 mg daily, and add Losartan 50 daily due to cardiomyopathy

## 2023-09-02 NOTE — PROGRESS NOTES
Cardiology Progress Note - Marli Marine 43 y.o. male MRN: [id number]    Unit/Bed#: S -01 Encounter: [id number]  Assessment and plan  #1 hypertensive urgency  #2 cardiomyopathy ejection fraction 40%  #3 severe pulmonary hypertension  #4 moderate mitral regurgitation  #5 acute on chronic combined systolic and diastolic congestive heart failure  #6 CKD stage III creatinine at baseline  #7 polysubstance abuse    Recommendations: Echocardiogram shows cardiomyopathy with elevated filling pressures and pulmonary hypertension. Needs ongoing diuresis. Continue IV Lasix today. Due to cardiomyopathy add losartan 50 mg daily increase Toprol-XL to twice daily dosing. He will have his first dose of amlodipine 5 mg this morning. Follow blood pressure closely. Will eventually need ischemic evaluation. Subjective:    No significant events overnight. Feels much better breathing has been improving he is making a fair amount of urine. Denies any chest pain or anginal sounding symptoms laying flat comfortably in bed. There is been no lightheadedness or dizziness. Denies any lower extremity edema. ROS    Objective:   Vitals: Blood pressure (!) 163/120, pulse 89, temperature 97.8 °F (36.6 °C), temperature source Oral, resp. rate 18, height 6' 3" (1.905 m), weight (!) 139 kg (306 lb), SpO2 99 %. , Body mass index is 38.25 kg/m².,   Orthostatic Blood Pressures    Flowsheet Row Most Recent Value   Blood Pressure 163/120 filed at 09/02/2023 0746   Patient Position - Orthostatic VS Lying filed at 09/02/2023 0672         Systolic (15JRH), USK:511 , Min:159 , ANTOINE:162     Diastolic (53HOY), ZLX:709, Min:105, Max:127      Intake/Output Summary (Last 24 hours) at 9/2/2023 0750  Last data filed at 9/2/2023 0732  Gross per 24 hour   Intake 480 ml   Output 125 ml   Net 355 ml     Weight (last 2 days)     Date/Time Weight    09/02/23 0600 139 (306)    09/01/23 1351 146 (322)    09/01/23 0329 146 (322.09)            Telemetry Review: No significant arrhythmias seen on telemetry review. EKG personally reviewed by Jordan Camacho DO. Physical Exam  Vitals and nursing note reviewed. Constitutional:       General: He is not in acute distress. Appearance: He is well-developed. HENT:      Head: Normocephalic and atraumatic. Eyes:      Conjunctiva/sclera: Conjunctivae normal.      Pupils: Pupils are equal, round, and reactive to light. Cardiovascular:      Rate and Rhythm: Normal rate and regular rhythm. Heart sounds: Normal heart sounds. No murmur heard. No friction rub. Pulmonary:      Effort: Pulmonary effort is normal. No respiratory distress. Breath sounds: Normal breath sounds. No wheezing or rales. Abdominal:      General: Bowel sounds are normal. There is no distension. Palpations: Abdomen is soft. Tenderness: There is no abdominal tenderness. There is no rebound. Musculoskeletal:         General: No tenderness or deformity. Normal range of motion. Cervical back: Neck supple. Skin:     General: Skin is warm and dry. Findings: No erythema. Neurological:      Mental Status: He is alert and oriented to person, place, and time. Cranial Nerves: No cranial nerve deficit.            Laboratory Results:        CBC with diff:   Results from last 7 days   Lab Units 09/02/23  0503 09/01/23  0344   WBC Thousand/uL 7.78 9.12   HEMOGLOBIN g/dL 14.4 13.2   HEMATOCRIT % 44.8 41.9   MCV fL 99* 101*   PLATELETS Thousands/uL 191 189   RBC Million/uL 4.51 4.16   MCH pg 31.9 31.7   MCHC g/dL 32.1 31.5   RDW % 12.2 12.4   MPV fL 11.8 11.2   NRBC AUTO /100 WBCs 0 0         CMP:  Results from last 7 days   Lab Units 09/01/23  0344   POTASSIUM mmol/L 3.7   CHLORIDE mmol/L 108   CO2 mmol/L 23   BUN mg/dL 14   CREATININE mg/dL 1.38*   CALCIUM mg/dL 9.0   AST U/L 44*   ALT U/L 32   ALK PHOS U/L 75   EGFR ml/min/1.73sq m 62         BMP:  Results from last 7 days   Lab Units 09/01/23  0344 POTASSIUM mmol/L 3.7   CHLORIDE mmol/L 108   CO2 mmol/L 23   BUN mg/dL 14   CREATININE mg/dL 1.38*   CALCIUM mg/dL 9.0       BNP:   Recent Labs     09/01/23  0344   *       Magnesium:   Results from last 7 days   Lab Units 09/01/23  0344   MAGNESIUM mg/dL 2.2       Coags:       TSH:        Hemoglobin A1C       Lipid Profile:       Cardiac testing:   No results found for this or any previous visit. No results found for this or any previous visit. No results found for this or any previous visit. No results found for this or any previous visit. Meds/Allergies   all current active meds have been reviewed  Medications Prior to Admission   Medication   • amLODIPine (NORVASC) 10 mg tablet   • hydrochlorothiazide (HYDRODIURIL) 25 mg tablet   • metoprolol succinate (TOPROL-XL) 25 mg 24 hr tablet          Assessment:  Principal Problem:    Acute heart failure (HCC)  Active Problems:    Stage 3 chronic kidney disease ([address])    Essential hypertension    Morbid obesity with BMI of 40.0-44.9, adult ([address])            Counseling / Coordination of Care  Total floor / unit time spent today 25 minutes. Greater than 50% of total time was spent with the patient and / or family counseling and / or coordination of care. A description of the counseling / coordination of care: Toi Stover

## 2023-09-02 NOTE — ED ATTENDING ATTESTATION
9/1/2023  I, Thiago Valentine MD, saw and evaluated the patient. I have discussed the patient with the resident/non-physician practitioner and agree with the resident's/non-physician practitioner's findings, Plan of Care, and MDM as documented in the resident's/non-physician practitioner's note, except where noted. All available labs and Radiology studies were reviewed. I was present for key portions of any procedure(s) performed by the resident/non-physician practitioner and I was immediately available to provide assistance. At this point I agree with the current assessment done in the Emergency Department. I have conducted an independent evaluation of this patient a history and physical is as follows:    Patient is a 12-year-old male who presents to the emergency department for evaluation after experiencing dyspnea and cough 2 minutes on arrival.  He brings up white foamy sputum coughing and notes that symptoms are much worse when supine. He has had trouble sleeping as a result and today had profound diaphoresis. No fevers, nasal congestion, nausea or vomiting. No palpitations. He denies recent change in intake or activity with exception of having felt profoundly fatigued and sleeping during the day for several hours 2 days ago. He has recently been following a keto diet. Past medical history significant for hypertension and CKD. Blood pressure had been well controlled up until approximately a year ago with triple therapy. Medications were not continued after his loss of insurance. He recently did regain this. On exam patient appears comfortable at rest.  He does appear slightly fatigued upon changing position-sitting upright for pulmonary exam.  Back is moist from diaphoresis. Heart sounds regular. Rales appreciated bibasilar. Decreased air movement elsewhere throughout the lungs. O2 sats had been in the upper 80s.   These are in the low to mid 90s on supplemental O2 at 2 L chest wall and abdomen are nontender. Lower extremities nontender nonedematous. Blood pressure is significantly elevated. Differential diagnosis of patient's orthopnea  and rales includes but is not limited to hypertensive urgency/emergency, ACS (especially concerning given fatigue a couple of days ago with resultant night symptoms), volume overload, CHF exacerbation, cardiomyopathy, worsening of renal insufficiency, PE.    ED Course  No evidence of acute ischemia on EKG. Chest x-ray reveals diffuse pulmonary edema. ED Course as of 09/02/23 0313   Fri Sep 01, 2023   0444 Renal function at baseline. IV labetalol ordered to decrease blood pressure and furosemide for diuresis. Currently on 2 L of supplemental O2.      Critical Care Time  Procedures

## 2023-09-02 NOTE — PROGRESS NOTES
[id number]  Progress Note  Name: Mindi Richardson  MRN: [id number]  Unit/Bed#: S -01 I Date of Admission: 9/1/2023   Date of Service: 9/2/2023 I Hospital Day: 1    Assessment/Plan   * Acute heart failure [facility])  Assessment & Plan  Wt Readings from Last 3 Encounters:   09/02/23 (!) 139 kg (306 lb)   01/18/22 (!) 151 kg (332 lb)   06/22/21 (!) 152 kg (335 lb)      Presents with increased shortness of breath, orthopnea, denies weight gain. Patient has lost 5 pounds. Lab Results   Component Value Date     (H) 09/01/2023    LVEF 40 09/01/2023     Plan:  Cardiology  Continue Lasix 40 IV BID through today    Echo completed. Revealed LVEF 40%   maintain Mg > 2 and K > 4; Replete as needed  Elevate head of bed to at least 30°, Daily weights, Measure I/Os  Consult nutrition services for dietary education  Sodium less than 2 g, fluids less than 1.8 L      Essential hypertension  Assessment & Plan  Patient previously used to take amlodipine, hydrochlorothiazide, metoprolol however he has lost insurance  Currently on Medicaid  Wants to speak to     Plan:  Cardiology following  Restart Toprol-XL 25 mg BID, amlodipine 5 mg daily, and add Losartan 50 daily due to cardiomyopathy    Stage 3 chronic kidney disease [facility])  Assessment & Plan  Lab Results   Component Value Date    EGFR 65 09/02/2023    EGFR 62 09/01/2023    EGFR 70 08/11/2021    CREATININE 1.33 (H) 09/02/2023    CREATININE 1.38 (H) 09/01/2023    CREATININE 1.43 (H) 08/11/2021   Creatinine 1.30-1.4, currently at baseline  Continue to monitor    Morbid obesity with BMI of 40.0-44.9, adult ([address])  Assessment & Plan  Advised weight loss      VTE Pharmacologic Prophylaxis: VTE Score: 6 High Risk (Score >/= 5) - Pharmacological DVT Prophylaxis Ordered: enoxaparin (Lovenox). Sequential Compression Devices Ordered. Patient Centered Rounds: I performed bedside rounds with nursing staff today.   Discussions with Specialists or Other Care Team Provider: Cardiology    Education and Discussions with Family / Patient: Updated  (wife) at bedside. Current Length of Stay: 1 day(s)  Current Patient Status: Inpatient   Discharge Plan: Anticipate discharge in 24-48 hrs to home. Code Status: Level 1 - Full Code    Subjective:   Josiane Pastor denies all symptoms this morning, including chest pain, SOB, abdominal pain, and leg swelling. He remains on 1 L NC with O2 sats in high 90s. Objective:     Vitals:   Temp (24hrs), Av.9 °F (36.6 °C), Min:97.8 °F (36.6 °C), Max:98.1 °F (36.7 °C)    Temp:  [97.8 °F (36.6 °C)-98.1 °F (36.7 °C)] 97.8 °F (36.6 °C)  HR:  [87-96] 89  Resp:  [18] 18  BP: (159-183)/(100-127) 162/100  SpO2:  [95 %-100 %] 100 %  Body mass index is 38.25 kg/m². Input and Output Summary (last 24 hours): Intake/Output Summary (Last 24 hours) at 2023 0825  Last data filed at 2023 0732  Gross per 24 hour   Intake 480 ml   Output 125 ml   Net 355 ml       Physical Exam:   Physical Exam  Vitals and nursing note reviewed. Constitutional:       General: He is not in acute distress. Appearance: He is well-developed. HENT:      Head: Normocephalic and atraumatic. Eyes:      Extraocular Movements: Extraocular movements intact. Conjunctiva/sclera: Conjunctivae normal.   Cardiovascular:      Rate and Rhythm: Normal rate and regular rhythm. Heart sounds: No murmur heard. Pulmonary:      Effort: Pulmonary effort is normal. No respiratory distress. Breath sounds: Normal breath sounds. Abdominal:      Palpations: Abdomen is soft. Tenderness: There is no abdominal tenderness. Musculoskeletal:         General: No swelling. Cervical back: Neck supple. Skin:     General: Skin is warm and dry. Neurological:      Mental Status: He is alert.    Psychiatric:         Mood and Affect: Mood normal.          Additional Data:     Labs:  Results from last 7 days   Lab Units 23  0503 WBC Thousand/uL 7.78   HEMOGLOBIN g/dL 14.4   HEMATOCRIT % 44.8   PLATELETS Thousands/uL 191   NEUTROS PCT % 71   LYMPHS PCT % 19   MONOS PCT % 8   EOS PCT % 1     Results from last 7 days   Lab Units 09/02/23  0503   SODIUM mmol/L 140   POTASSIUM mmol/L 3.0*   CHLORIDE mmol/L 102   CO2 mmol/L 27   BUN mg/dL 11   CREATININE mg/dL 1.33*   ANION GAP mmol/L 11   CALCIUM mg/dL 9.0   ALBUMIN g/dL 4.2   TOTAL BILIRUBIN mg/dL 1.29*   ALK PHOS U/L 70   ALT U/L 21   AST U/L 24   GLUCOSE RANDOM mg/dL 104                 Results from last 7 days   Lab Units 09/01/23  0344   LACTIC ACID mmol/L 1.2       Lines/Drains:  Invasive Devices     Peripheral Intravenous Line  Duration           Peripheral IV 09/01/23 Right;Ventral (anterior) Forearm 1 day                  Telemetry:  Telemetry Orders (From admission, onward)             24 Hour Telemetry Monitoring  Continuous x 24 Hours (Telem)        Question:  Reason for 24 Hour Telemetry  Answer:  PCI/EP study (including pacer and ICD implementation), Cardiac surgery, MI, abnormal cardiac cath, and chest pain- rule out MI                 Telemetry Reviewed: Normal Sinus Rhythm  Indication for Continued Telemetry Use: New diagnosis of acute HR with reduced EF and cardiomyopathy              Imaging: Reviewed radiology reports from this admission including: ECHO    Recent Cultures (last 7 days):         Last 24 Hours Medication List:   Current Facility-Administered Medications   Medication Dose Route Frequency Provider Last Rate   • acetaminophen  650 mg Oral Q6H PRN Musa Pandya MD     • amLODIPine  5 mg Oral Daily Yassine Cuadra DO     • enoxaparin  40 mg Subcutaneous BID Jose Martin Brasher MD     • furosemide  40 mg Intravenous BID (diuretic) Jose Martin Brasher MD     • losartan  50 mg Oral Daily Yassine Cuadra DO     • metoprolol  2.5 mg Intravenous Q6H PRN Brianna Zamorano MD     • metoprolol succinate  25 mg Oral BID Jordan Camacho DO • nicotine  1 patch Transdermal Daily Venessa Resendez MD     • nitroglycerin  0.4 mg Sublingual Q5 Min PRN Venessa Resendez MD     • potassium chloride  40 mEq Oral Once Shawnee Amezquita MD          Today, Patient Was Seen By: Shawnee Amezquita MD    **Please Note: This note may have been constructed using a voice recognition system. **

## 2023-09-02 NOTE — PLAN OF CARE
Problem: PAIN - ADULT  Goal: Verbalizes/displays adequate comfort level or baseline comfort level  Description: Interventions:  - Encourage patient to monitor pain and request assistance  - Assess pain using appropriate pain scale  - Administer analgesics based on type and severity of pain and evaluate response  - Implement non-pharmacological measures as appropriate and evaluate response  - Consider cultural and social influences on pain and pain management  - Notify physician/advanced practitioner if interventions unsuccessful or patient reports new pain  Outcome: Progressing     Problem: INFECTION - ADULT  Goal: Absence or prevention of progression during hospitalization  Description: INTERVENTIONS:  - Assess and monitor for signs and symptoms of infection  - Monitor lab/diagnostic results  - Monitor all insertion sites, i.e. indwelling lines, tubes, and drains  - Monitor endotracheal if appropriate and nasal secretions for changes in amount and color  - [place] appropriate cooling/warming therapies per order  - Administer medications as ordered  - Instruct and encourage patient and family to use good hand hygiene technique  - Identify and instruct in appropriate isolation precautions for identified infection/condition  Outcome: Progressing  Goal: Absence of fever/infection during neutropenic period  Description: INTERVENTIONS:  - Monitor WBC    Outcome: Progressing     Problem: SAFETY ADULT  Goal: Patient will remain free of falls  Description: INTERVENTIONS:  - Educate patient/family on patient safety including physical limitations  - Instruct patient to call for assistance with activity   - Consult OT/PT to assist with strengthening/mobility   - Keep Call bell within reach  - Keep bed low and locked with side rails adjusted as appropriate  - Keep care items and personal belongings within reach  - Initiate and maintain comfort rounds  - Make Fall Risk Sign visible to staff  - Apply yellow socks and bracelet for high fall risk patients  - Consider moving patient to room near nurses station  Outcome: Progressing  Goal: Maintain or return to baseline ADL function  Description: INTERVENTIONS:  -  Assess patient's ability to carry out ADLs; assess patient's baseline for ADL function and identify physical deficits which impact ability to perform ADLs (bathing, care of mouth/teeth, toileting, grooming, dressing, etc.)  - Assess/evaluate cause of self-care deficits   - Assess range of motion  - Assess patient's mobility; develop plan if impaired  - Assess patient's need for assistive devices and provide as appropriate  - Encourage maximum independence but intervene and supervise when necessary  - Involve family in performance of ADLs  - Assess for home care needs following discharge   - Consider OT consult to assist with ADL evaluation and planning for discharge  - Provide patient education as appropriate  Outcome: Progressing  Goal: Maintains/Returns to pre admission functional level  Description: INTERVENTIONS:  - Perform BMAT or MOVE assessment daily.   - Set and communicate daily mobility goal to care team and patient/family/caregiver.    - Collaborate with rehabilitation services on mobility goals if consulted  - Out of bed for toileting  - Record patient progress and toleration of activity level   Outcome: Progressing     Problem: DISCHARGE PLANNING  Goal: Discharge to home or other facility with appropriate resources  Description: INTERVENTIONS:  - Identify barriers to discharge w/patient and caregiver  - Arrange for needed discharge resources and transportation as appropriate  - Identify discharge learning needs (meds, wound care, etc.)  - Arrange for interpretive services to assist at discharge as needed  - Refer to Case Management Department for coordinating discharge planning if the patient needs post-hospital services based on physician/advanced practitioner order or complex needs related to functional status, cognitive ability, or social support system  Outcome: Progressing     Problem: Knowledge Deficit  Goal: Patient/family/caregiver demonstrates understanding of disease process, treatment plan, medications, and discharge instructions  Description: Complete learning assessment and assess knowledge base. Interventions:  - Provide teaching at level of understanding  - Provide teaching via preferred learning methods  Outcome: Progressing     Problem: Nutrition/Hydration-ADULT  Goal: Nutrient/Hydration intake appropriate for improving, restoring or maintaining nutritional needs  Description: Monitor and assess patient's nutrition/hydration status for malnutrition. Collaborate with interdisciplinary team and initiate plan and interventions as ordered. Monitor patient's weight and dietary intake as ordered or per policy. Utilize nutrition screening tool and intervene as necessary. Determine patient's food preferences and provide high-protein, high-caloric foods as appropriate.      INTERVENTIONS:  - Monitor oral intake, urinary output, labs, and treatment plans  - Assess nutrition and hydration status and recommend course of action  - Evaluate amount of meals eaten  - Assist patient with eating if necessary   - Allow adequate time for meals  - Recommend/ encourage appropriate diets, oral nutritional supplements, and vitamin/mineral supplements  - Order, calculate, and assess calorie counts as needed  - Recommend, monitor, and adjust tube feedings and TPN/PPN based on assessed needs  - Assess need for intravenous fluids  - Provide specific nutrition/hydration education as appropriate  - Include patient/family/caregiver in decisions related to nutrition  Outcome: Progressing

## 2023-09-02 NOTE — ASSESSMENT & PLAN NOTE
Patient previously used to take amlodipine, hydrochlorothiazide, metoprolol however he has lost insurance  Currently on Medicaid  Wants to speak to     Plan:  Cardiology following  Continue Toprol-XL 25 mg BID, amlodipine 5 mg daily, and Losartan 50 mg daily due to cardiomyopathy

## 2023-09-02 NOTE — ASSESSMENT & PLAN NOTE
Wt Readings from Last 3 Encounters:   09/02/23 (!) 139 kg (306 lb)   01/18/22 (!) 151 kg (332 lb)   06/22/21 (!) 152 kg (335 lb)      Presents with increased shortness of breath, orthopnea, denies weight gain. Patient has lost 5 pounds. Lab Results   Component Value Date     (H) 09/01/2023    LVEF 40 09/01/2023     Plan:  Cardiology consulted  Continue diuresis per cardiology, per my exam appears euvolemic no JVD, no LE edema, no crackles  Echo completed. Revealed LVEF 40%. Systolic function moderate reduced. grade 2 DD.   Maintain Mg > 2 and K > 4; Replete as needed  Elevate head of bed to at least 30°, Daily weights, Measure I/Os  Consult nutrition services for dietary education  Sodium less than 2 g, fluids less than 1.8 L  On 1 L NC O2, ambulatory pulse ox ordered

## 2023-09-02 NOTE — ASSESSMENT & PLAN NOTE
Wt Readings from Last 3 Encounters:   09/02/23 (!) 139 kg (306 lb)   01/18/22 (!) 151 kg (332 lb)   06/22/21 (!) 152 kg (335 lb)      Presents with increased shortness of breath, orthopnea, denies weight gain. Patient has lost 5 pounds. Lab Results   Component Value Date     (H) 09/01/2023    LVEF 40 09/01/2023     Plan:  Cardiology  Continue Lasix 40 IV BID through today    Echo completed.  Revealed LVEF 40%   maintain Mg > 2 and K > 4; Replete as needed  Elevate head of bed to at least 30°, Daily weights, Measure I/Os  Consult nutrition services for dietary education  Sodium less than 2 g, fluids less than 1.8 L

## 2023-09-02 NOTE — ASSESSMENT & PLAN NOTE
Lab Results   Component Value Date    EGFR 62 09/02/2023    EGFR 65 09/02/2023    EGFR 62 09/01/2023    CREATININE 1.39 (H) 09/02/2023    CREATININE 1.33 (H) 09/02/2023    CREATININE 1.38 (H) 09/01/2023   Creatinine 1.30-1.4, currently at baseline  Continue to monitor

## 2023-09-02 NOTE — DISCHARGE SUMMARY
[facility]  Discharge- Yordan Coup 1980, 43 y.o. male MRN: [id number]  Unit/Bed#: S -01 Encounter: [id number]  Primary Care Provider: Alesha Calhoun MD   Date and time admitted to hospital: 9/1/2023  3:26 AM    * Acute heart failure [facility])  Assessment & Plan  Wt Readings from Last 3 Encounters:   09/04/23 135 kg (297 lb 6.4 oz)   01/18/22 (!) 151 kg (332 lb)   06/22/21 (!) 152 kg (335 lb)      Presents with increased shortness of breath, orthopnea, denies weight gain. Patient has lost about 25 pounds. His dry weight appears to be around 297 pounds    Lab Results   Component Value Date     (H) 09/01/2023    LVEF 40 09/01/2023     Plan:  Cardiology consulted  Continue Lasix 40 mg oral  Echo completed. Revealed LVEF 40%. Systolic function moderate reduced. grade 2 DD. Potassium mildly decreased. Will prescribe 5 days of K-Dur 40 mEq. We will give 1 dose K-Dur 40 mEq before he goes.         Essential hypertension  Assessment & Plan  Patient previously used to take amlodipine, hydrochlorothiazide, metoprolol however he has lost insurance  Currently on Medicaid      Plan:  Cardiology following  Continue Toprol-XL 50 mg BID, amlodipine 5 mg twice daily, and Losartan 100 mg daily due to cardiomyopathy    Stage 3 chronic kidney disease [facility])  Assessment & Plan  Lab Results   Component Value Date    EGFR 58 09/04/2023    EGFR 56 09/03/2023    EGFR 62 09/02/2023    CREATININE 1.46 (H) 09/04/2023    CREATININE 1.51 (H) 09/03/2023    CREATININE 1.39 (H) 09/02/2023   Creatinine 1.30-1.4, currently at baseline  Continue to monitor    Morbid obesity with BMI of 40.0-44.9, adult [facility])  Assessment & Plan  Encouraged lifestyle modification    Medical Problems     Resolved Problems  Date Reviewed: 9/3/2023   None       Discharging Resident: Max Callaway MD  Discharging Attending: Emmanuel May MD  PCP: Alesha Calhoun MD  Admission Date:   Admission Orders (From admission, onward)     Ordered        09/01/23 0532  Inpatient Admission  Once                      Discharge Date: 09/04/23    Consultations During Hospital Stay:  · Cardiology    Procedures Performed:   · Echo    Significant Findings / Test Results:   · Echocardiogram revealing cardiomyopathy with elevated filling pressures and pulmonary hypertension. Left ventricular ejection fraction 40%. Incidental Findings:   · None    Test Results Pending at Discharge (will require follow up): · None     Outpatient Tests Requested:  · BMP in 1 week after discharge    Complications: None    Reason for Admission: Acute heart failure    Hospital Course:   Joneen Angelucci is a 43 y.o. male patient with a past medical history of hypertension, obesity, & stage III CKD who originally presented to the hospital on 9/1/2023 due to a 2-day history of orthopnea, worsening dyspnea with exertion, and dry cough. Avni Palmer reported being noncompliant with his hypertension medications due to previous insurance issues. In the ED, vitals were significant for a blood pressure of 195/196. He was given 1 dose of labetalol and his blood pressure went to 167/102. Labs were significant for a BNP of 713. Creatinine was at baseline of 1.38. Troponins were negative. checks x-ray revealed pulmonary congestion and mild cardiomegaly. Avni Palmer was diagnosed with an acute heart failure exacerbation. An echo was performed and revealed cardiomyopathy, pulmonary hypertension, and LVEF 40%. He was restarted on his home hypertension medications of Toprol XL 25 mg twice daily and amlodipine 5 mg daily. He was started on IV Lasix 40 mg twice daily. Cardiology was consulted and they began losartan 50 mg daily due to his cardiomyopathy. He required 2 L O2 nasal cannula initially but was eventually weaned down to room air. Per cardiology, patient will need eventual Lexiscan to rule out ischemia.   He was transitioned to Lasix 40 mg oral.  On discharge his BP regimen is: Toprol XL 50 mg twice daily, losartan 100 mg daily, amlodipine 5 mg twice daily, and Lasix 40 mg daily. Weight on admission was 322 lb; weight on discharge was 297.5 lb. We will give 5 days of K-Dur 40 mEq given hypokalemia. Recheck BMP in 1 week      Please see above list of diagnoses and related plan for additional information. Condition at Discharge: stable    Discharge Day Visit / Exam:   Subjective: He feels well. Looks forward to possibly having Labor Day barbecue event. He states he feels ready to go home today. Vitals: Blood Pressure: 133/96 (09/04/23 0707)  Pulse: 81 (09/04/23 0707)  Temperature: 98.3 °F (36.8 °C) (09/04/23 0707)  Temp Source: Oral (09/04/23 0706)  Respirations: 17 (09/04/23 0707)  Height: 6' 3" (190.5 cm) (09/01/23 1351)  Weight - Scale: 135 kg (297 lb 6.4 oz) (09/04/23 0600)  SpO2: 91 % (09/04/23 0707)  Exam:   Physical Exam  Vitals and nursing note reviewed. Constitutional:       General: He is not in acute distress. Appearance: He is obese. He is not ill-appearing. HENT:      Mouth/Throat:      Mouth: Mucous membranes are moist.   Eyes:      General: No scleral icterus. Conjunctiva/sclera: Conjunctivae normal.   Cardiovascular:      Rate and Rhythm: Normal rate and regular rhythm. Pulses: Normal pulses. Heart sounds: No murmur heard. Pulmonary:      Effort: Pulmonary effort is normal. No respiratory distress. Breath sounds: Normal breath sounds. No wheezing or rales. Abdominal:      Palpations: Abdomen is soft. Tenderness: There is no abdominal tenderness. Musculoskeletal:         General: No swelling. Right lower leg: No edema. Left lower leg: No edema. Skin:     General: Skin is warm. Findings: No rash. Neurological:      General: No focal deficit present. Mental Status: He is alert and oriented to person, place, and time.    Psychiatric:         Mood and Affect: Mood normal.         Behavior: Behavior normal. Discussion with Family: Patient declined call to . Discharge instructions/Information to patient and family:   See after visit summary for information provided to patient and family. Provisions for Follow-Up Care:  See after visit summary for information related to follow-up care and any pertinent home health orders. Disposition:   Home    Planned Readmission: No    Discharge Medications:  See after visit summary for reconciled discharge medications provided to patient and/or family.       **Please Note: This note may have been constructed using a voice recognition system**

## 2023-09-02 NOTE — UTILIZATION REVIEW
Initial Clinical Review    Admission: Date/Time/Statement:   Admission Orders (From admission, onward)     Ordered        09/01/23 0532  Inpatient Admission  Once                      Orders Placed This Encounter   Procedures   • Inpatient Admission     Standing Status:   Standing     Number of Occurrences:   1     Order Specific Question:   Level of Care     Answer:   Med Surg [16]     Order Specific Question:   Estimated length of stay     Answer:   More than 2 Midnights     Order Specific Question:   Certification     Answer:   I certify that inpatient services are medically necessary for this patient for a duration of greater than two midnights. See H&P and MD Progress Notes for additional information about the patient's course of treatment. ED Arrival Information     Expected   -    Arrival   9/1/2023 03:22    Acuity   Emergent            Means of arrival   Walk-In    Escorted by   Family Member    Service   Hospitalist    Admission type   Emergency            Arrival complaint   SOB           Chief Complaint   Patient presents with   • Shortness of Breath     Pt reports SOB x 2 days "at night" with cough and runny nose today; worsened tonight; denies CP/dizziness; hx htn - no medication x 1 year due to loss of insurance       Initial Presentation: 43 y.o. male from home to ED admitted inpatient due to new heart failure, likely diastolic due to uncontrolled hypertension. Presented due to shortness of breath starting 2 days prior to arrival.  Unable to lay flat.  + cough. Non compliant with medications due to loss of insurance. Drinks 1 pint cognac and smokes 2 blunts marijuana daily. On exam acute distress. Obese. Diaphoretic. Tachycardic. Hypertensive. Tachypnea and accessory muscle use. Lungs rales. Bilateral + 1 edema. Anxious. . Bun 14. Creatinine 1.38 with baseline of 1.3 - 1.4. CxR pulmonary edema. In the ED placed on oxygen 2 liters.   Started on Nitropaste and given Lasix and labetalol. Plan is trend troponin. Check echo. Telemetry. Diuresis with IV lasix. Sodium and fluid restriction. Restart metoprolol succinate and hctz. 9/1/23 per Cardiology - patient with hypertensive urgency/acute CHF/flash pulmonary edema/CKD stage 3. Plan is increase antihypertensives. Continue diuresis.  echo    Date: 9/2/23    Day 2: feels better than admission. titrating oxygen down. On exam bilateral trace edema LE. Echocardiogram shows cardiomyopathy with elevated filling pressures and pulmonary hypertension Plan is diuresis. Add losartan. Increase toprol XL to twice daily. Follow BP  Eventual ischemic evaluation.       ED Triage Vitals [09/01/23 0329]   Temperature Pulse Respirations Blood Pressure SpO2   98.1 °F (36.7 °C) (!) 115 (!) 24 (!) 195/126 95 %      Temp Source Heart Rate Source Patient Position - Orthostatic VS BP Location FiO2 (%)   Oral Monitor Sitting Left arm --      Pain Score       No Pain          Wt Readings from Last 1 Encounters:   09/02/23 (!) 139 kg (306 lb)     Additional Vital Signs:   09/02/23 14:57:12 98.7 °F (37.1 °C) 95 18 154/110 Abnormal  125 92 % -- -- -- --   09/02/23 1104 -- -- -- 148/100 -- -- -- -- -- Lying   09/02/23 11:02:27 98.4 °F (36.9 °C) 94 -- 157/109 Abnormal  125 99 % -- -- -- Lying   09/02/23 0752 -- -- -- 162/100 -- 100 % -- -- -- Lying   09/02/23 07:46:03 97.8 °F (36.6 °C) 89 18 163/120 Abnormal  134 99 % 24 1 L/min Nasal cannula Lying   09/02/23 0734 -- -- -- -- -- -- -- -- None (Room air) --   09/01/23 21:56:34 98.1 °F (36.7 °C) 87 -- 164/116 Abnormal  132 95 % -- -- -- --   09/01/23 2130 -- -- -- -- -- 96 % -- -- None (Room air) --   09/01/23 1608 -- -- -- 170/118 Abnormal  -- -- -- -- -- Sitting   09/01/23 15:55:49 97.8 °F (36.6 °C) 91 18 183/127 Abnormal  146 96 % -- -- None (Room air) Sitting   09/01/23 1351 -- 96 -- 159/105 Abnormal  -- -- -- -- -- --   09/01/23 1000 -- -- -- -- -- -- -- -- None (Room air) --   09/01/23 07:06:03 98.3 °F (36.8 °C) 90 -- 159/105 Abnormal  123 91 % -- -- -- --   09/01/23 06:10:19 -- -- -- 161/110 Abnormal  127 -- -- -- -- --   09/01/23 0500 -- 89 20 161/108 Abnormal  129 98 % 28 2 L/min Nasal cannula      Pertinent Labs/Diagnostic Test Results:   XR chest 1 view portable   ED Interpretation by Penny Wood MD (09/01 0405)   Diffuse alveolar infiltrates likely 2/2 pulmonary edema      Final Result by Lucinda Prakash MD (09/01 0720)   Mild cardiomegaly and probable vascular congestion                  Workstation performed: [id number]           9/1/23 ecg  Sinus tachycardia  Left axis deviation  Poor anterior R wave progression is noted  Abnormal ECG  No previous ECGs available    9/1/23 ecg Normal sinus rhythm  Possible Left atrial enlargement  T wave abnormality, consider lateral ischemia  Abnormal ECG  When compared with ECG of 01-SEP-2023 03:36, (unconfirmed)  QRS axis Shifted right  Nonspecific T wave abnormality now evident in Inferior leads  T wave inversion now evident in Anterior leads    9/1/23 ecg Normal sinus rhythm  Possible Left atrial enlargement  T wave abnormality, consider lateral ischemia  Abnormal ECG  When compared with ECG of 01-SEP-2023 06:15, (unconfirmed)  No significant change was found    9/1/23 echo Left Ventricle: Left ventricular cavity size is moderately dilated. Wall thickness is mildly increased. There is eccentric hypertrophy. The left ventricular ejection fraction is 40%. Systolic function is moderately reduced. There is moderate global hypokinesis. Diastolic function is moderately abnormal, consistent with grade II (pseudonormal) relaxation. •  Right Ventricle: Right ventricular cavity size is at the upper limit of normal. Systolic function is normal.  •  Left Atrium: The atrium is moderately dilated. •  Right Atrium: The atrium is mildly dilated. •  Aortic Valve: There is mild regurgitation. •  Mitral Valve: There is moderate regurgitation.   •  Tricuspid Valve: There is mild regurgitation.  The estimated right ventricular systolic pressure is 74.50 mmHg    Results from last 7 days   Lab Units 09/01/23  0344   SARS-COV-2  Negative     Results from last 7 days   Lab Units 09/02/23  0503 09/01/23  0344   WBC Thousand/uL 7.78 9.12   HEMOGLOBIN g/dL 14.4 13.2   HEMATOCRIT % 44.8 41.9   PLATELETS Thousands/uL 191 189   NEUTROS ABS Thousands/µL 5.60 6.53     Results from last 7 days   Lab Units 09/02/23  1154 09/02/23  0503 09/01/23  0344   SODIUM mmol/L 140 140 139   POTASSIUM mmol/L 3.6 3.0* 3.7   CHLORIDE mmol/L 100 102 108   CO2 mmol/L 30 27 23   ANION GAP mmol/L 10 11 8   BUN mg/dL 12 11 14   CREATININE mg/dL 1.39* 1.33* 1.38*   EGFR ml/min/1.73sq m 62 65 62   CALCIUM mg/dL 9.8 9.0 9.0   MAGNESIUM mg/dL  --  2.1 2.2   PHOSPHORUS mg/dL  --  3.0  --      Results from last 7 days   Lab Units 09/02/23  0503 09/01/23  0344   AST U/L 24 44*   ALT U/L 21 32   ALK PHOS U/L 70 75   TOTAL PROTEIN g/dL 7.3 7.4   ALBUMIN g/dL 4.2 4.4   TOTAL BILIRUBIN mg/dL 1.29* 0.66     Results from last 7 days   Lab Units 09/02/23  1154 09/02/23  0503 09/01/23  0344   GLUCOSE RANDOM mg/dL 104 104 109     Results from last 7 days   Lab Units 09/01/23  0344   PH CLARISSA  7.433*   PCO2 CLARISSA mm Hg 36.2*   PO2 CLARISSA mm Hg 107.1*   HCO3 CLARISSA mmol/L 23.7*   BASE EXC CLARISSA mmol/L -0.2   O2 CONTENT CLARISSA ml/dL 19.5   O2 HGB, VENOUS % 94.9*     Results from last 7 days   Lab Units 09/01/23  0903 09/01/23  0344   HS TNI 0HR ng/L  --  21   HS TNI 4HR ng/L 22  --    HSTNI D4 ng/L 1  --      Results from last 7 days   Lab Units 09/01/23  0344   LACTIC ACID mmol/L 1.2     Results from last 7 days   Lab Units 09/01/23  0344   BNP pg/mL 713*     Results from last 7 days   Lab Units 09/01/23  0410   CLARITY UA  Clear   COLOR UA  Light Yellow   SPEC GRAV UA  1.021   PH UA  6.0   GLUCOSE UA mg/dl Negative   KETONES UA mg/dl Negative   BLOOD UA  Negative   PROTEIN UA mg/dl Trace*   NITRITE UA  Negative   BILIRUBIN UA  Negative UROBILINOGEN UA (BE) mg/dl <2.0   LEUKOCYTES UA  Negative   WBC UA /hpf 2-4*   RBC UA /hpf None Seen   BACTERIA UA /hpf None Seen   EPITHELIAL CELLS WET PREP /hpf None Seen   MUCUS THREADS  Occasional*     Results from last 7 days   Lab Units 09/01/23  0344   INFLUENZA A PCR  Negative   INFLUENZA B PCR  Negative   RSV PCR  Negative       ED Treatment:   Medication Administration from 09/01/2023 0322 to 09/01/2023 6533       Date/Time Order Dose Route Action Comments     09/01/2023 0349 EDT nitroglycerin (NITRO-BID) 2 % TD ointment 0.5 inch 0.5 inch Topical Given --     09/01/2023 0440 EDT labetalol (NORMODYNE) injection 20 mg 20 mg Intravenous Given --     09/01/2023 0445 EDT furosemide (LASIX) injection 20 mg 20 mg Intravenous Given --        Past Medical History:   Diagnosis Date   • Hypertension    • Morbid obesity with BMI of 40.0-44.9, adult ([address])      Present on Admission:  • Stage 3 chronic kidney disease ([address])  • Essential hypertension      Admitting Diagnosis: Acute heart failure (HCC) [I50.9]  SOB (shortness of breath) [R06.02]  Age/Sex: 43 y.o. male  Admission Orders:  09/01/23 0532 inpatient   Scheduled Medications:  amLODIPine, 5 mg, Oral, BID  enoxaparin, 40 mg, Subcutaneous, BID  furosemide, 40 mg, Intravenous, BID (diuretic)  losartan, 50 mg, Oral, Daily  metoprolol succinate, 25 mg, Oral, BID  nicotine, 1 patch, Transdermal, Daily    amLODIPine (NORVASC) tablet 5 mg  Dose: 5 mg  Freq: Daily Route: PO  Start: 09/02/23 0900 End: 09/02/23 1635  metoprolol succinate (TOPROL-XL) 24 hr tablet 25 mg  Dose: 25 mg  Freq: Daily Route: PO  Start: 09/01/23 0530 End: 09/02/23 0746    potassium chloride (K-DUR,KLOR-CON) CR tablet 40 mEq  Dose: 40 mEq  Freq: Once Route: PO  Start: 09/02/23 1400 End: 09/02/23 1456  potassium chloride (K-DUR,KLOR-CON) CR tablet 40 mEq  Dose: 40 mEq  Freq:  Once Route: PO  Start: 09/02/23 0930 End: 09/02/23 0948  potassium chloride (K-DUR,KLOR-CON) CR tablet 40 mEq  Dose: 40 mEq  Freq: Once Route: PO  Start: 09/02/23 0830 End: 09/02/23 0832    Continuous IV Infusions:     PRN Meds:  acetaminophen, 650 mg, Oral, Q6H PRN x 1 9/1  metoprolol, 2.5 mg, Intravenous, Q6H PRN x 1 9/1  nitroglycerin, 0.4 mg, Sublingual, Q5 Min PRN      Telemetry   Fluid restriction     IP CONSULT TO CARDIOLOGY    Network Utilization Review Department  ATTENTION: Please call with any questions or concerns to [phone] and carefully listen to the prompts so that you are directed to the right person. All voicemails are confidential.  Nilesh Miranda all requests for admission clinical reviews, approved or denied determinations and any other requests to dedicated fax number below belonging to the campus where the patient is receiving treatment.  List of dedicated fax numbers for the Facilities:  [place] DENIALS (Administrative/Medical Necessity) [phone] [address] [facility] (Maternity/NICU/Pediatrics) [phone]   [address] [place] [address] Drive [phone]   [facility] [address] [facility] [phone]   [address] [facility] [address] [facility] [address] [address] [facility] [phone]   [address] [facility] [address] [facility]  [address] [phone]

## 2023-09-03 LAB
ANION GAP SERPL CALCULATED.3IONS-SCNC: 6 MMOL/L
BUN SERPL-MCNC: 13 MG/DL (ref 5–25)
CALCIUM SERPL-MCNC: 9.7 MG/DL (ref 8.4–10.2)
CHLORIDE SERPL-SCNC: 103 MMOL/L (ref 96–108)
CO2 SERPL-SCNC: 30 MMOL/L (ref 21–32)
CREAT SERPL-MCNC: 1.51 MG/DL (ref 0.6–1.3)
ERYTHROCYTE [DISTWIDTH] IN BLOOD BY AUTOMATED COUNT: 12.1 % (ref 11.6–15.1)
GFR SERPL CREATININE-BSD FRML MDRD: 56 ML/MIN/1.73SQ M
GLUCOSE SERPL-MCNC: 112 MG/DL (ref 65–140)
HCT VFR BLD AUTO: 47.6 % (ref 36.5–49.3)
HGB BLD-MCNC: 15.3 G/DL (ref 12–17)
MCH RBC QN AUTO: 32.4 PG (ref 26.8–34.3)
MCHC RBC AUTO-ENTMCNC: 32.1 G/DL (ref 31.4–37.4)
MCV RBC AUTO: 101 FL (ref 82–98)
PLATELET # BLD AUTO: 198 THOUSANDS/UL (ref 149–390)
PMV BLD AUTO: 11.1 FL (ref 8.9–12.7)
POTASSIUM SERPL-SCNC: 3.7 MMOL/L (ref 3.5–5.3)
RBC # BLD AUTO: 4.72 MILLION/UL (ref 3.88–5.62)
SODIUM SERPL-SCNC: 139 MMOL/L (ref 135–147)
WBC # BLD AUTO: 6.97 THOUSAND/UL (ref 4.31–10.16)

## 2023-09-03 PROCEDURE — 99232 SBSQ HOSP IP/OBS MODERATE 35: CPT | Performed by: INTERNAL MEDICINE

## 2023-09-03 PROCEDURE — 85027 COMPLETE CBC AUTOMATED: CPT

## 2023-09-03 PROCEDURE — 80048 BASIC METABOLIC PNL TOTAL CA: CPT

## 2023-09-03 RX ORDER — POTASSIUM CHLORIDE 20 MEQ/1
40 TABLET, EXTENDED RELEASE ORAL ONCE
Status: COMPLETED | OUTPATIENT
Start: 2023-09-03 | End: 2023-09-03

## 2023-09-03 RX ORDER — FUROSEMIDE 40 MG/1
40 TABLET ORAL DAILY
Status: DISCONTINUED | OUTPATIENT
Start: 2023-09-04 | End: 2023-09-04 | Stop reason: HOSPADM

## 2023-09-03 RX ADMIN — ENOXAPARIN SODIUM 40 MG: 40 INJECTION SUBCUTANEOUS at 08:29

## 2023-09-03 RX ADMIN — METOPROLOL SUCCINATE 25 MG: 25 TABLET, EXTENDED RELEASE ORAL at 20:55

## 2023-09-03 RX ADMIN — FUROSEMIDE 40 MG: 10 INJECTION, SOLUTION INTRAMUSCULAR; INTRAVENOUS at 08:29

## 2023-09-03 RX ADMIN — AMLODIPINE BESYLATE 5 MG: 5 TABLET ORAL at 20:56

## 2023-09-03 RX ADMIN — METOPROLOL SUCCINATE 25 MG: 25 TABLET, EXTENDED RELEASE ORAL at 08:29

## 2023-09-03 RX ADMIN — POTASSIUM CHLORIDE 40 MEQ: 1500 TABLET, EXTENDED RELEASE ORAL at 08:29

## 2023-09-03 RX ADMIN — NICOTINE 1 PATCH: 14 PATCH, EXTENDED RELEASE TRANSDERMAL at 08:29

## 2023-09-03 RX ADMIN — LOSARTAN POTASSIUM 50 MG: 50 TABLET, FILM COATED ORAL at 08:29

## 2023-09-03 RX ADMIN — AMLODIPINE BESYLATE 5 MG: 5 TABLET ORAL at 08:29

## 2023-09-03 RX ADMIN — ENOXAPARIN SODIUM 40 MG: 40 INJECTION SUBCUTANEOUS at 17:18

## 2023-09-03 NOTE — PLAN OF CARE
Problem: PAIN - ADULT  Goal: Verbalizes/displays adequate comfort level or baseline comfort level  Description: Interventions:  - Encourage patient to monitor pain and request assistance  - Assess pain using appropriate pain scale  - Administer analgesics based on type and severity of pain and evaluate response  - Implement non-pharmacological measures as appropriate and evaluate response  - Consider cultural and social influences on pain and pain management  - Notify physician/advanced practitioner if interventions unsuccessful or patient reports new pain  Outcome: Progressing     Problem: INFECTION - ADULT  Goal: Absence or prevention of progression during hospitalization  Description: INTERVENTIONS:  - Assess and monitor for signs and symptoms of infection  - Monitor lab/diagnostic results  - Monitor all insertion sites, i.e. indwelling lines, tubes, and drains  - Monitor endotracheal if appropriate and nasal secretions for changes in amount and color  - [place] appropriate cooling/warming therapies per order  - Administer medications as ordered  - Instruct and encourage patient and family to use good hand hygiene technique  - Identify and instruct in appropriate isolation precautions for identified infection/condition  Outcome: Progressing  Goal: Absence of fever/infection during neutropenic period  Description: INTERVENTIONS:  - Monitor WBC    Outcome: Progressing     Problem: SAFETY ADULT  Goal: Patient will remain free of falls  Description: INTERVENTIONS:  - Educate patient/family on patient safety including physical limitations  - Instruct patient to call for assistance with activity   - Consult OT/PT to assist with strengthening/mobility   - Keep Call bell within reach  - Keep bed low and locked with side rails adjusted as appropriate  - Keep care items and personal belongings within reach  - Initiate and maintain comfort rounds  - Make Fall Risk Sign visible to staff  - Apply yellow socks and bracelet for high fall risk patients  - Consider moving patient to room near nurses station  Outcome: Progressing  Goal: Maintain or return to baseline ADL function  Description: INTERVENTIONS:  -  Assess patient's ability to carry out ADLs; assess patient's baseline for ADL function and identify physical deficits which impact ability to perform ADLs (bathing, care of mouth/teeth, toileting, grooming, dressing, etc.)  - Assess/evaluate cause of self-care deficits   - Assess range of motion  - Assess patient's mobility; develop plan if impaired  - Assess patient's need for assistive devices and provide as appropriate  - Encourage maximum independence but intervene and supervise when necessary  - Involve family in performance of ADLs  - Assess for home care needs following discharge   - Consider OT consult to assist with ADL evaluation and planning for discharge  - Provide patient education as appropriate  Outcome: Progressing  Goal: Maintains/Returns to pre admission functional level  Description: INTERVENTIONS:  - Perform BMAT or MOVE assessment daily.   - Set and communicate daily mobility goal to care team and patient/family/caregiver.    - Collaborate with rehabilitation services on mobility goals if consulted  - Out of bed for toileting  - Record patient progress and toleration of activity level   Outcome: Progressing     Problem: DISCHARGE PLANNING  Goal: Discharge to home or other facility with appropriate resources  Description: INTERVENTIONS:  - Identify barriers to discharge w/patient and caregiver  - Arrange for needed discharge resources and transportation as appropriate  - Identify discharge learning needs (meds, wound care, etc.)  - Arrange for interpretive services to assist at discharge as needed  - Refer to Case Management Department for coordinating discharge planning if the patient needs post-hospital services based on physician/advanced practitioner order or complex needs related to functional status, cognitive ability, or social support system  Outcome: Progressing     Problem: Knowledge Deficit  Goal: Patient/family/caregiver demonstrates understanding of disease process, treatment plan, medications, and discharge instructions  Description: Complete learning assessment and assess knowledge base. Interventions:  - Provide teaching at level of understanding  - Provide teaching via preferred learning methods  Outcome: Progressing     Problem: Nutrition/Hydration-ADULT  Goal: Nutrient/Hydration intake appropriate for improving, restoring or maintaining nutritional needs  Description: Monitor and assess patient's nutrition/hydration status for malnutrition. Collaborate with interdisciplinary team and initiate plan and interventions as ordered. Monitor patient's weight and dietary intake as ordered or per policy. Utilize nutrition screening tool and intervene as necessary. Determine patient's food preferences and provide high-protein, high-caloric foods as appropriate.      INTERVENTIONS:  - Monitor oral intake, urinary output, labs, and treatment plans  - Assess nutrition and hydration status and recommend course of action  - Evaluate amount of meals eaten  - Assist patient with eating if necessary   - Allow adequate time for meals  - Recommend/ encourage appropriate diets, oral nutritional supplements, and vitamin/mineral supplements  - Order, calculate, and assess calorie counts as needed  - Recommend, monitor, and adjust tube feedings and TPN/PPN based on assessed needs  - Assess need for intravenous fluids  - Provide specific nutrition/hydration education as appropriate  - Include patient/family/caregiver in decisions related to nutrition  Outcome: Progressing

## 2023-09-03 NOTE — PLAN OF CARE
Problem: PAIN - ADULT  Goal: Verbalizes/displays adequate comfort level or baseline comfort level  Description: Interventions:  - Encourage patient to monitor pain and request assistance  - Assess pain using appropriate pain scale  - Administer analgesics based on type and severity of pain and evaluate response  - Implement non-pharmacological measures as appropriate and evaluate response  - Consider cultural and social influences on pain and pain management  - Notify physician/advanced practitioner if interventions unsuccessful or patient reports new pain  Outcome: Progressing     Problem: INFECTION - ADULT  Goal: Absence or prevention of progression during hospitalization  Description: INTERVENTIONS:  - Assess and monitor for signs and symptoms of infection  - Monitor lab/diagnostic results  - Monitor all insertion sites, i.e. indwelling lines, tubes, and drains  - Monitor endotracheal if appropriate and nasal secretions for changes in amount and color  - [place] appropriate cooling/warming therapies per order  - Administer medications as ordered  - Instruct and encourage patient and family to use good hand hygiene technique  - Identify and instruct in appropriate isolation precautions for identified infection/condition  Outcome: Progressing  Goal: Absence of fever/infection during neutropenic period  Description: INTERVENTIONS:  - Monitor WBC    Outcome: Progressing     Problem: SAFETY ADULT  Goal: Patient will remain free of falls  Description: INTERVENTIONS:  - Educate patient/family on patient safety including physical limitations  - Instruct patient to call for assistance with activity   - Consult OT/PT to assist with strengthening/mobility   - Keep Call bell within reach  - Keep bed low and locked with side rails adjusted as appropriate  - Keep care items and personal belongings within reach  - Initiate and maintain comfort rounds  - Make Fall Risk Sign visible to staff  - Apply yellow socks and bracelet for high fall risk patients  - Consider moving patient to room near nurses station  Outcome: Progressing  Goal: Maintain or return to baseline ADL function  Description: INTERVENTIONS:  -  Assess patient's ability to carry out ADLs; assess patient's baseline for ADL function and identify physical deficits which impact ability to perform ADLs (bathing, care of mouth/teeth, toileting, grooming, dressing, etc.)  - Assess/evaluate cause of self-care deficits   - Assess range of motion  - Assess patient's mobility; develop plan if impaired  - Assess patient's need for assistive devices and provide as appropriate  - Encourage maximum independence but intervene and supervise when necessary  - Involve family in performance of ADLs  - Assess for home care needs following discharge   - Consider OT consult to assist with ADL evaluation and planning for discharge  - Provide patient education as appropriate  Outcome: Progressing  Goal: Maintains/Returns to pre admission functional level  Description: INTERVENTIONS:  - Perform BMAT or MOVE assessment daily.   - Set and communicate daily mobility goal to care team and patient/family/caregiver.    - Collaborate with rehabilitation services on mobility goals if consulted  - Out of bed for toileting  - Record patient progress and toleration of activity level   Outcome: Progressing     Problem: DISCHARGE PLANNING  Goal: Discharge to home or other facility with appropriate resources  Description: INTERVENTIONS:  - Identify barriers to discharge w/patient and caregiver  - Arrange for needed discharge resources and transportation as appropriate  - Identify discharge learning needs (meds, wound care, etc.)  - Arrange for interpretive services to assist at discharge as needed  - Refer to Case Management Department for coordinating discharge planning if the patient needs post-hospital services based on physician/advanced practitioner order or complex needs related to functional status, cognitive ability, or social support system  Outcome: Progressing     Problem: Knowledge Deficit  Goal: Patient/family/caregiver demonstrates understanding of disease process, treatment plan, medications, and discharge instructions  Description: Complete learning assessment and assess knowledge base.   Interventions:  - Provide teaching at level of understanding  - Provide teaching via preferred learning methods  Outcome: Progressing

## 2023-09-03 NOTE — PROGRESS NOTES
Cardiology Progress Note - Lis Plaza 43 y.o. male MRN: [id number]    Unit/Bed#: S -01 Encounter: [id number]  Assessment and plan  #1 hypertensive urgency  #2 cardiomyopathy ejection fraction 40%  #3 severe pulmonary hypertension  #4 moderate mitral regurgitation  #5 acute on chronic combined systolic and diastolic congestive heart failure  #6 CKD stage III creatinine at baseline  #7 polysubstance abuse    Recommendations: Volume status improving discontinue IV Lasix today start 40 of oral tomorrow. Continue Toprol-XL twice daily for cardiomyopathy continue losartan 50 mg daily if renal function is stable tomorrow can increase to 50 twice daily. Continue amlodipine. Patient does not want to stay in the hospital more than 1 more day. Will need eventual Lexiscan to rule out ischemia he says he is going to come to the office for follow-up. We will assess how he looks tomorrow and make a determination on stability for discharge. Subjective:    No significant events overnight. Feels better today denies any chest pain, shortness of breath, palpitations. Weights are coming down. He wants to leave the hospital today he was agreeable to stay 1 more day until tomorrow. ROS    Objective:   Vitals: Blood pressure 155/95, pulse 102, temperature 98.1 °F (36.7 °C), temperature source Oral, resp. rate 18, height 6' 3" (1.905 m), weight 136 kg (300 lb), SpO2 99 %. , Body mass index is 37.5 kg/m².,   Orthostatic Blood Pressures    Flowsheet Row Most Recent Value   Blood Pressure 155/95 filed at 09/03/2023 2446   Patient Position - Orthostatic VS Lying filed at 09/03/2023 1568         Systolic (56YYQ), OSL:115 , Min:148 , FQF:431     Diastolic (77BWX), BTO:781, Min:95, Max:117      Intake/Output Summary (Last 24 hours) at 9/3/2023 1053  Last data filed at 9/3/2023 0956  Gross per 24 hour   Intake 360 ml   Output 3025 ml   Net -2665 ml     Weight (last 2 days)     Date/Time Weight    09/03/23 0600 136 (300) 09/02/23 0600 139 (306)    09/01/23 1351 146 (322)    09/01/23 0329 146 (322.09)        Physical Exam:  Vital signs reviewed  General:  Alert and cooperative, appears stated age, no acute distress  HEENT:  PERRLA, EOMI, no scleral icterus, no conjunctival pallor  Neck:  No lymphadenopathy, no thyromegaly, no carotid bruits, no elevated JVP  Heart:  Regular rate and rhythm, normal S1/S2, no S3/S4, no murmur, rubs or gallops. PMI nondisplaced  Lungs:  Clear to auscultation bilaterally, no wheezes rales or rhonchi  Abdomen:  Soft, non-tender, positive bowel sounds, no rebound or guarding,   no organomegaly   Extremities:  Normal range of motion. No clubbing, cyanosis or edema   Vascular:  2+ pedal pulses  Skin:  No rashes or lesions on exposed skin  Neurologic:  Cranial nerves II-XII grossly intact without focal deficits  Psych:  Normal mood and affect      Telemetry Review: No significant arrhythmias seen on telemetry review. EKG personally reviewed by Nusrat Jorge DO.          Laboratory Results:        CBC with diff:   Results from last 7 days   Lab Units 09/03/23  0513 09/02/23  0503 09/01/23  0344   WBC Thousand/uL 6.97 7.78 9.12   HEMOGLOBIN g/dL 15.3 14.4 13.2   HEMATOCRIT % 47.6 44.8 41.9   MCV fL 101* 99* 101*   PLATELETS Thousands/uL 198 191 189   RBC Million/uL 4.72 4.51 4.16   MCH pg 32.4 31.9 31.7   MCHC g/dL 32.1 32.1 31.5   RDW % 12.1 12.2 12.4   MPV fL 11.1 11.8 11.2   NRBC AUTO /100 WBCs  --  0 0         CMP:  Results from last 7 days   Lab Units 09/03/23  0513 09/02/23  1154 09/02/23  0503 09/01/23  0344   POTASSIUM mmol/L 3.7 3.6 3.0* 3.7   CHLORIDE mmol/L 103 100 102 108   CO2 mmol/L 30 30 27 23   BUN mg/dL 13 12 11 14   CREATININE mg/dL 1.51* 1.39* 1.33* 1.38*   CALCIUM mg/dL 9.7 9.8 9.0 9.0   AST U/L  --   --  24 44*   ALT U/L  --   --  21 32   ALK PHOS U/L  --   --  70 75   EGFR ml/min/1.73sq m 56 62 65 62         BMP:  Results from last 7 days   Lab Units 09/03/23  0513 09/02/23  1156 09/02/23  0503 09/01/23  0344   POTASSIUM mmol/L 3.7 3.6 3.0* 3.7   CHLORIDE mmol/L 103 100 102 108   CO2 mmol/L 30 30 27 23   BUN mg/dL 13 12 11 14   CREATININE mg/dL 1.51* 1.39* 1.33* 1.38*   CALCIUM mg/dL 9.7 9.8 9.0 9.0       BNP:   Recent Labs     09/01/23 0344   *       Magnesium:   Results from last 7 days   Lab Units 09/02/23  0503 09/01/23  0344   MAGNESIUM mg/dL 2.1 2.2       Coags:       TSH:        Hemoglobin A1C       Lipid Profile:       Cardiac testing:   No results found for this or any previous visit. No results found for this or any previous visit. No results found for this or any previous visit. No results found for this or any previous visit. Meds/Allergies   all current active meds have been reviewed  Medications Prior to Admission   Medication   • amLODIPine (NORVASC) 10 mg tablet   • hydrochlorothiazide (HYDRODIURIL) 25 mg tablet   • metoprolol succinate (TOPROL-XL) 25 mg 24 hr tablet          Assessment:  Principal Problem:    Acute heart failure (HCC)  Active Problems:    Stage 3 chronic kidney disease ([address])    Essential hypertension    Morbid obesity with BMI of 40.0-44.9, adult ([address])            Counseling / Coordination of Care  Total floor / unit time spent today 25 minutes. Greater than 50% of total time was spent with the patient and / or family counseling and / or coordination of care. A description of the counseling / coordination of care: aMria T Momin

## 2023-09-03 NOTE — PROGRESS NOTES
[id number]  Progress Note  Name: Lamin Sifuentes  MRN: [id number]  Unit/Bed#: S -01 I Date of Admission: 9/1/2023   Date of Service: 9/3/2023 I Hospital Day: 2    Assessment/Plan   * Acute heart failure [facility])  Assessment & Plan  Wt Readings from Last 3 Encounters:   09/02/23 (!) 139 kg (306 lb)   01/18/22 (!) 151 kg (332 lb)   06/22/21 (!) 152 kg (335 lb)      Presents with increased shortness of breath, orthopnea, denies weight gain. Patient has lost 5 pounds. Lab Results   Component Value Date     (H) 09/01/2023    LVEF 40 09/01/2023     Plan:  Cardiology consulted  Continue diuresis per cardiology, per my exam appears euvolemic no JVD, no LE edema, no crackles  Echo completed. Revealed LVEF 40%. Systolic function moderate reduced. grade 2 DD.   Maintain Mg > 2 and K > 4; Replete as needed  Elevate head of bed to at least 30°, Daily weights, Measure I/Os  Consult nutrition services for dietary education  Sodium less than 2 g, fluids less than 1.8 L  On 1 L NC O2, ambulatory pulse ox ordered    Essential hypertension  Assessment & Plan  Patient previously used to take amlodipine, hydrochlorothiazide, metoprolol however he has lost insurance  Currently on Medicaid  Wants to speak to     Plan:  Cardiology following  Continue Toprol-XL 25 mg BID, amlodipine 5 mg daily, and Losartan 50 mg daily due to cardiomyopathy    Stage 3 chronic kidney disease [facility])  Assessment & Plan  Lab Results   Component Value Date    EGFR 62 09/02/2023    EGFR 65 09/02/2023    EGFR 62 09/01/2023    CREATININE 1.39 (H) 09/02/2023    CREATININE 1.33 (H) 09/02/2023    CREATININE 1.38 (H) 09/01/2023   Creatinine 1.30-1.4, currently at baseline  Continue to monitor    Morbid obesity with BMI of 40.0-44.9, adult ([address])  Assessment & Plan  Advised weight loss             VTE Pharmacologic Prophylaxis: VTE Score: 6 High Risk (Score >/= 5) - Pharmacological DVT Prophylaxis Ordered: enoxaparin (Lovenox). Sequential Compression Devices Ordered. Patient Centered Rounds: I performed bedside rounds with nursing staff today. Discussions with Specialists or Other Care Team Provider: Dr. Wiliam Henderson    Education and Discussions with Family / Patient: Updated  (wife) via phone. Current Length of Stay: 2 day(s)  Current Patient Status: Inpatient   Discharge Plan: Anticipate discharge later today or tomorrow to home. Code Status: Level 1 - Full Code    Subjective:   Feels well. Anxious to go home. Denies chest pain, shortness of breath, abdominal pain, any leg swelling. Was on 1 L NC this morning. Objective:     Vitals:   Temp (24hrs), Av.4 °F (36.9 °C), Min:98.1 °F (36.7 °C), Max:98.7 °F (37.1 °C)    Temp:  [98.1 °F (36.7 °C)-98.7 °F (37.1 °C)] 98.1 °F (36.7 °C)  HR:  [81-95] 81  Resp:  [18] 18  BP: (148-166)/() 155/95  SpO2:  [92 %-99 %] 97 %  Body mass index is 37.5 kg/m². Input and Output Summary (last 24 hours): Intake/Output Summary (Last 24 hours) at 9/3/2023 0903  Last data filed at 2023 1820  Gross per 24 hour   Intake 840 ml   Output 3075 ml   Net -2235 ml       Physical Exam:   Physical Exam  Vitals and nursing note reviewed. Constitutional:       General: He is not in acute distress. Appearance: He is obese. He is not ill-appearing. HENT:      Mouth/Throat:      Mouth: Mucous membranes are moist.   Eyes:      General: No scleral icterus. Conjunctiva/sclera: Conjunctivae normal.   Cardiovascular:      Rate and Rhythm: Normal rate and regular rhythm. Pulses: Normal pulses. Heart sounds: No murmur heard. Pulmonary:      Effort: Pulmonary effort is normal. No respiratory distress. Breath sounds: Normal breath sounds. No wheezing or rales. Abdominal:      Palpations: Abdomen is soft. Tenderness: There is no abdominal tenderness. Musculoskeletal:         General: No swelling. Right lower leg: No edema.       Left lower leg: No edema. Skin:     General: Skin is warm. Findings: No rash. Neurological:      General: No focal deficit present. Mental Status: He is alert and oriented to person, place, and time. Psychiatric:         Mood and Affect: Mood normal.         Behavior: Behavior normal.          Additional Data:     Labs:  Results from last 7 days   Lab Units 23  0513 23  0503   WBC Thousand/uL 6.97 7.78   HEMOGLOBIN g/dL 15.3 14.4   HEMATOCRIT % 47.6 44.8   PLATELETS Thousands/uL 198 191   NEUTROS PCT %  --  71   LYMPHS PCT %  --  19   MONOS PCT %  --  8   EOS PCT %  --  1     Results from last 7 days   Lab Units 23  0513 23  1154 23  0503   SODIUM mmol/L 139   < > 140   POTASSIUM mmol/L 3.7   < > 3.0*   CHLORIDE mmol/L 103   < > 102   CO2 mmol/L 30   < > 27   BUN mg/dL 13   < > 11   CREATININE mg/dL 1.51*   < > 1.33*   ANION GAP mmol/L 6   < > 11   CALCIUM mg/dL 9.7   < > 9.0   ALBUMIN g/dL  --   --  4.2   TOTAL BILIRUBIN mg/dL  --   --  1.29*   ALK PHOS U/L  --   --  70   ALT U/L  --   --  21   AST U/L  --   --  24   GLUCOSE RANDOM mg/dL 112   < > 104    < > = values in this interval not displayed. Results from last 7 days   Lab Units 23  0344   LACTIC ACID mmol/L 1.2       Lines/Drains:  Invasive Devices     Peripheral Intravenous Line  Duration           Peripheral IV 23 Right;Ventral (anterior) Forearm 2 days                  Telemetry:  Telemetry Orders (From admission, onward)             24 Hour Telemetry Monitoring  Continuous x 24 Hours (Telem)           Question:  Reason for 24 Hour Telemetry  Answer:  PCI/EP study (including pacer and ICD implementation), Cardiac surgery, MI, abnormal cardiac cath, and chest pain- rule out MI                 Telemetry Reviewed: Normal Sinus Rhythm  Indication for Continued Telemetry Use: No indication for continued use. Will discontinue. Imaging: No pertinent imaging reviewed.     Recent Cultures (last 7 days):         Last 24 Hours Medication List:   Current Facility-Administered Medications   Medication Dose Route Frequency Provider Last Rate   • acetaminophen  650 mg Oral Q6H PRN Oswaldo Chapman MD     • amLODIPine  5 mg Oral BID Yassine Cuadra DO     • enoxaparin  40 mg Subcutaneous BID Marionette Simmonds, MD     • furosemide  40 mg Intravenous BID (diuretic) Marionette Simmonds, MD     • losartan  50 mg Oral Daily Yassine Cuadra DO     • metoprolol  2.5 mg Intravenous Q6H PRN Miranda Gabriel MD     • metoprolol succinate  25 mg Oral BID [facility] Silke, DO     • nicotine  1 patch Transdermal Daily Marionette Simmonds, MD     • nitroglycerin  0.4 mg Sublingual Q5 Min PRN Marionette Simmonds, MD          Today, Patient Was Seen By: Miranda Gabriel MD    **Please Note: This note may have been constructed using a voice recognition system. **

## 2023-09-04 VITALS
TEMPERATURE: 98.3 F | RESPIRATION RATE: 17 BRPM | HEIGHT: 75 IN | HEART RATE: 81 BPM | OXYGEN SATURATION: 91 % | BODY MASS INDEX: 36.98 KG/M2 | SYSTOLIC BLOOD PRESSURE: 133 MMHG | DIASTOLIC BLOOD PRESSURE: 96 MMHG | WEIGHT: 297.4 LBS

## 2023-09-04 LAB
ANION GAP SERPL CALCULATED.3IONS-SCNC: 7 MMOL/L
BUN SERPL-MCNC: 17 MG/DL (ref 5–25)
CALCIUM SERPL-MCNC: 9.7 MG/DL (ref 8.4–10.2)
CHLORIDE SERPL-SCNC: 104 MMOL/L (ref 96–108)
CO2 SERPL-SCNC: 27 MMOL/L (ref 21–32)
CREAT SERPL-MCNC: 1.46 MG/DL (ref 0.6–1.3)
GFR SERPL CREATININE-BSD FRML MDRD: 58 ML/MIN/1.73SQ M
GLUCOSE SERPL-MCNC: 103 MG/DL (ref 65–140)
POTASSIUM SERPL-SCNC: 3.3 MMOL/L (ref 3.5–5.3)
SODIUM SERPL-SCNC: 138 MMOL/L (ref 135–147)

## 2023-09-04 PROCEDURE — 99232 SBSQ HOSP IP/OBS MODERATE 35: CPT | Performed by: INTERNAL MEDICINE

## 2023-09-04 PROCEDURE — 99239 HOSP IP/OBS DSCHRG MGMT >30: CPT | Performed by: INTERNAL MEDICINE

## 2023-09-04 PROCEDURE — 80048 BASIC METABOLIC PNL TOTAL CA: CPT

## 2023-09-04 RX ORDER — LOSARTAN POTASSIUM 100 MG/1
100 TABLET ORAL DAILY
Qty: 30 TABLET | Refills: 0 | Status: SHIPPED | OUTPATIENT
Start: 2023-09-05 | End: 2023-10-02 | Stop reason: SDUPTHER

## 2023-09-04 RX ORDER — LOSARTAN POTASSIUM 50 MG/1
100 TABLET ORAL DAILY
Status: DISCONTINUED | OUTPATIENT
Start: 2023-09-04 | End: 2023-09-04 | Stop reason: HOSPADM

## 2023-09-04 RX ORDER — POTASSIUM CHLORIDE 20 MEQ/1
40 TABLET, EXTENDED RELEASE ORAL ONCE
Status: DISCONTINUED | OUTPATIENT
Start: 2023-09-04 | End: 2023-09-04 | Stop reason: HOSPADM

## 2023-09-04 RX ORDER — METOPROLOL SUCCINATE 50 MG/1
50 TABLET, EXTENDED RELEASE ORAL 2 TIMES DAILY
Status: DISCONTINUED | OUTPATIENT
Start: 2023-09-04 | End: 2023-09-04 | Stop reason: HOSPADM

## 2023-09-04 RX ORDER — AMLODIPINE BESYLATE 5 MG/1
5 TABLET ORAL 2 TIMES DAILY
Qty: 60 TABLET | Refills: 0 | Status: SHIPPED | OUTPATIENT
Start: 2023-09-04 | End: 2023-09-08

## 2023-09-04 RX ORDER — METOPROLOL SUCCINATE 50 MG/1
50 TABLET, EXTENDED RELEASE ORAL 2 TIMES DAILY
Qty: 60 TABLET | Refills: 0 | Status: SHIPPED | OUTPATIENT
Start: 2023-09-04 | End: 2023-09-30 | Stop reason: SDUPTHER

## 2023-09-04 RX ORDER — POTASSIUM CHLORIDE 20 MEQ/1
40 TABLET, EXTENDED RELEASE ORAL DAILY
Qty: 10 TABLET | Refills: 0 | Status: SHIPPED | OUTPATIENT
Start: 2023-09-04 | End: 2023-09-08

## 2023-09-04 RX ORDER — FUROSEMIDE 40 MG/1
40 TABLET ORAL DAILY
Qty: 30 TABLET | Refills: 0 | Status: SHIPPED | OUTPATIENT
Start: 2023-09-05 | End: 2023-10-02 | Stop reason: SDUPTHER

## 2023-09-04 RX ADMIN — LOSARTAN POTASSIUM 100 MG: 50 TABLET, FILM COATED ORAL at 09:06

## 2023-09-04 RX ADMIN — AMLODIPINE BESYLATE 5 MG: 5 TABLET ORAL at 09:07

## 2023-09-04 RX ADMIN — FUROSEMIDE 40 MG: 40 TABLET ORAL at 09:07

## 2023-09-04 RX ADMIN — METOPROLOL SUCCINATE 50 MG: 50 TABLET, EXTENDED RELEASE ORAL at 09:07

## 2023-09-04 RX ADMIN — ENOXAPARIN SODIUM 40 MG: 40 INJECTION SUBCUTANEOUS at 09:07

## 2023-09-04 RX ADMIN — NICOTINE 1 PATCH: 14 PATCH, EXTENDED RELEASE TRANSDERMAL at 09:07

## 2023-09-04 NOTE — ASSESSMENT & PLAN NOTE
Patient previously used to take amlodipine, hydrochlorothiazide, metoprolol however he has lost insurance  Currently on Medicaid      Plan:  Cardiology following  Continue Toprol-XL 50 mg BID, amlodipine 5 mg twice daily, and Losartan 100 mg daily due to cardiomyopathy

## 2023-09-04 NOTE — PLAN OF CARE
Problem: PAIN - ADULT  Goal: Verbalizes/displays adequate comfort level or baseline comfort level  Description: Interventions:  - Encourage patient to monitor pain and request assistance  - Assess pain using appropriate pain scale  - Administer analgesics based on type and severity of pain and evaluate response  - Implement non-pharmacological measures as appropriate and evaluate response  - Consider cultural and social influences on pain and pain management  - Notify physician/advanced practitioner if interventions unsuccessful or patient reports new pain  Outcome: Progressing     Problem: INFECTION - ADULT  Goal: Absence or prevention of progression during hospitalization  Description: INTERVENTIONS:  - Assess and monitor for signs and symptoms of infection  - Monitor lab/diagnostic results  - Monitor all insertion sites, i.e. indwelling lines, tubes, and drains  - Monitor endotracheal if appropriate and nasal secretions for changes in amount and color  - [place] appropriate cooling/warming therapies per order  - Administer medications as ordered  - Instruct and encourage patient and family to use good hand hygiene technique  - Identify and instruct in appropriate isolation precautions for identified infection/condition  Outcome: Progressing  Goal: Absence of fever/infection during neutropenic period  Description: INTERVENTIONS:  - Monitor WBC    Outcome: Progressing     Problem: SAFETY ADULT  Goal: Patient will remain free of falls  Description: INTERVENTIONS:  - Educate patient/family on patient safety including physical limitations  - Instruct patient to call for assistance with activity   - Consult OT/PT to assist with strengthening/mobility   - Keep Call bell within reach  - Keep bed low and locked with side rails adjusted as appropriate  - Keep care items and personal belongings within reach  - Initiate and maintain comfort rounds  - Make Fall Risk Sign visible to staff  - Apply yellow socks and bracelet for high fall risk patients  - Consider moving patient to room near nurses station  Outcome: Progressing  Goal: Maintain or return to baseline ADL function  Description: INTERVENTIONS:  -  Assess patient's ability to carry out ADLs; assess patient's baseline for ADL function and identify physical deficits which impact ability to perform ADLs (bathing, care of mouth/teeth, toileting, grooming, dressing, etc.)  - Assess/evaluate cause of self-care deficits   - Assess range of motion  - Assess patient's mobility; develop plan if impaired  - Assess patient's need for assistive devices and provide as appropriate  - Encourage maximum independence but intervene and supervise when necessary  - Involve family in performance of ADLs  - Assess for home care needs following discharge   - Consider OT consult to assist with ADL evaluation and planning for discharge  - Provide patient education as appropriate  Outcome: Progressing  Goal: Maintains/Returns to pre admission functional level  Description: INTERVENTIONS:  - Perform BMAT or MOVE assessment daily.   - Set and communicate daily mobility goal to care team and patient/family/caregiver.    - Collaborate with rehabilitation services on mobility goals if consulted  - Out of bed for toileting  - Record patient progress and toleration of activity level   Outcome: Progressing     Problem: DISCHARGE PLANNING  Goal: Discharge to home or other facility with appropriate resources  Description: INTERVENTIONS:  - Identify barriers to discharge w/patient and caregiver  - Arrange for needed discharge resources and transportation as appropriate  - Identify discharge learning needs (meds, wound care, etc.)  - Arrange for interpretive services to assist at discharge as needed  - Refer to Case Management Department for coordinating discharge planning if the patient needs post-hospital services based on physician/advanced practitioner order or complex needs related to functional status, cognitive ability, or social support system  Outcome: Progressing     Problem: Knowledge Deficit  Goal: Patient/family/caregiver demonstrates understanding of disease process, treatment plan, medications, and discharge instructions  Description: Complete learning assessment and assess knowledge base. Interventions:  - Provide teaching at level of understanding  - Provide teaching via preferred learning methods  Outcome: Progressing     Problem: Nutrition/Hydration-ADULT  Goal: Nutrient/Hydration intake appropriate for improving, restoring or maintaining nutritional needs  Description: Monitor and assess patient's nutrition/hydration status for malnutrition. Collaborate with interdisciplinary team and initiate plan and interventions as ordered. Monitor patient's weight and dietary intake as ordered or per policy. Utilize nutrition screening tool and intervene as necessary. Determine patient's food preferences and provide high-protein, high-caloric foods as appropriate.      INTERVENTIONS:  - Monitor oral intake, urinary output, labs, and treatment plans  - Assess nutrition and hydration status and recommend course of action  - Evaluate amount of meals eaten  - Assist patient with eating if necessary   - Allow adequate time for meals  - Recommend/ encourage appropriate diets, oral nutritional supplements, and vitamin/mineral supplements  - Order, calculate, and assess calorie counts as needed  - Recommend, monitor, and adjust tube feedings and TPN/PPN based on assessed needs  - Assess need for intravenous fluids  - Provide specific nutrition/hydration education as appropriate  - Include patient/family/caregiver in decisions related to nutrition  Outcome: Progressing

## 2023-09-04 NOTE — PROGRESS NOTES
Cardiology Progress Note - Sarita Hastings 43 y.o. male MRN: [id number]    Unit/Bed#: S -01 Encounter: [id number]  Assessment and plan  #1 hypertensive urgency  #2 cardiomyopathy ejection fraction 40%  #3 severe pulmonary hypertension  #4 moderate mitral regurgitation  #5 acute on chronic combined systolic and diastolic congestive heart failure  #6 CKD stage III creatinine at baseline  #7 polysubstance abuse    Recommendations: Volume status improving discontinue IV Lasix today start 40 of oral tomorrow. Toprol increased to 50 mg twice daily and losartan increased to 100 mg daily. Continue amlodipine. Patient does not want to stay in the hospital more than 1 more day. Will need eventual Lexiscan to rule out ischemia he says he is going to come to the office for follow-up. Blood pressure is much better controlled than coming in, he wants to leave the hospital today with the slight increases he should be much better over the coming days. Office will call tomorrow to set up follow-up and Corinne Lister. We will also need an outpatient sleep study. I suspect he has apnea driving the pulmonary hypertension. I explained to him that he needs to take his medications and be compliant. Subjective:    No significant events overnight. Offers no complaints denies any chest pain, shortness of breath, palpitations, lightheadedness, dizziness. ROS    Objective:   Vitals: Blood pressure 133/96, pulse 81, temperature 98.3 °F (36.8 °C), resp.  rate 17, height 6' 3" (1.905 m), weight 135 kg (297 lb 6.4 oz), SpO2 91 %., Body mass index is 37.17 kg/m².,   Orthostatic Blood Pressures    Flowsheet Row Most Recent Value   Blood Pressure 133/96 filed at 09/04/2023 0707   Patient Position - Orthostatic VS Lying filed at 09/04/2023 8106         Systolic (20XDL), EIS:604 , Min:133 , CYM:343     Diastolic (45ZVI), NIL:519, Min:96, Max:117      Intake/Output Summary (Last 24 hours) at 9/4/2023 4562  Last data filed at 9/3/2023 1701  Gross per 24 hour   Intake 120 ml   Output 1300 ml   Net -1180 ml     Weight (last 2 days)     Date/Time Weight    09/04/23 0600 135 (297.4)    09/03/23 0600 136 (300)    09/02/23 0600 139 (306)        Physical Exam:  Vital signs reviewed  General:  Alert and cooperative, appears stated age, no acute distress  HEENT:  PERRLA, EOMI, no scleral icterus, no conjunctival pallor  Neck:  No lymphadenopathy, no thyromegaly, no carotid bruits, no elevated JVP  Heart:  Regular rate and rhythm, normal S1/S2, no S3/S4, no murmur, rubs or gallops. PMI nondisplaced  Lungs:  Clear to auscultation bilaterally, no wheezes rales or rhonchi  Abdomen:  Soft, non-tender, positive bowel sounds, no rebound or guarding,   no organomegaly   Extremities:  Normal range of motion. No clubbing, cyanosis or edema   Vascular:  2+ pedal pulses  Skin:  No rashes or lesions on exposed skin  Neurologic:  Cranial nerves II-XII grossly intact without focal deficits  Psych:  Normal mood and affect        Telemetry Review: No significant arrhythmias seen on telemetry review. EKG personally reviewed by Chani Ortiz DO.          Laboratory Results:        CBC with diff:   Results from last 7 days   Lab Units 09/03/23  0513 09/02/23  0503 09/01/23  0344   WBC Thousand/uL 6.97 7.78 9.12   HEMOGLOBIN g/dL 15.3 14.4 13.2   HEMATOCRIT % 47.6 44.8 41.9   MCV fL 101* 99* 101*   PLATELETS Thousands/uL 198 191 189   RBC Million/uL 4.72 4.51 4.16   MCH pg 32.4 31.9 31.7   MCHC g/dL 32.1 32.1 31.5   RDW % 12.1 12.2 12.4   MPV fL 11.1 11.8 11.2   NRBC AUTO /100 WBCs  --  0 0         CMP:  Results from last 7 days   Lab Units 09/04/23  0602 09/03/23  0513 09/02/23  1154 09/02/23  0503 09/01/23  0344   POTASSIUM mmol/L 3.3* 3.7 3.6 3.0* 3.7   CHLORIDE mmol/L 104 103 100 102 108   CO2 mmol/L 27 30 30 27 23   BUN mg/dL 17 13 12 11 14   CREATININE mg/dL 1.46* 1.51* 1.39* 1.33* 1.38*   CALCIUM mg/dL 9.7 9.7 9.8 9.0 9.0   AST U/L  --   --   --  24 44*   ALT U/L  --   --   --  21 32   ALK PHOS U/L  --   --   --  70 75   EGFR ml/min/1.73sq m 58 56 62 65 62         BMP:  Results from last 7 days   Lab Units 09/04/23  0602 09/03/23  0513 09/02/23  1154 09/02/23  0503 09/01/23  0344   POTASSIUM mmol/L 3.3* 3.7 3.6 3.0* 3.7   CHLORIDE mmol/L 104 103 100 102 108   CO2 mmol/L 27 30 30 27 23   BUN mg/dL 17 13 12 11 14   CREATININE mg/dL 1.46* 1.51* 1.39* 1.33* 1.38*   CALCIUM mg/dL 9.7 9.7 9.8 9.0 9.0       BNP:   No results for input(s): "BNP" in the last 72 hours. Magnesium:   Results from last 7 days   Lab Units 09/02/23  0503 09/01/23  0344   MAGNESIUM mg/dL 2.1 2.2       Coags:       TSH:        Hemoglobin A1C       Lipid Profile:       Cardiac testing:   No results found for this or any previous visit. No results found for this or any previous visit. No results found for this or any previous visit. No results found for this or any previous visit. Meds/Allergies   all current active meds have been reviewed  Medications Prior to Admission   Medication   • amLODIPine (NORVASC) 10 mg tablet   • hydrochlorothiazide (HYDRODIURIL) 25 mg tablet   • metoprolol succinate (TOPROL-XL) 25 mg 24 hr tablet          Assessment:  Principal Problem:    Acute heart failure (HCC)  Active Problems:    Stage 3 chronic kidney disease ([address] [facility])    Essential hypertension    Morbid obesity with BMI of 40.0-44.9, adult ([address] [facility])            Counseling / Coordination of Care  Total floor / unit time spent today 25 minutes. Greater than 50% of total time was spent with the patient and / or family counseling and / or coordination of care. A description of the counseling / coordination of care: Erasto Stevens

## 2023-09-04 NOTE — DISCHARGE INSTR - AVS FIRST PAGE
Dear Mark Jenkins,     It was our pleasure to care for you here at [facility], [employer]. It is our hope that we were always able to exceed the expected standards for your care during your stay. You were hospitalized due to acute heart failure exacerbation. You were cared for on the third floor by Kamar Palomino MD under the service of Steve Acuña MD with the [facility] who covers for your primary care physician (PCP), Santiago So MD, while you were hospitalized. If you have any questions or concerns related to this hospitalization, you may contact us at [phone]. For follow up as well as any medication refills, we recommend that you follow up with your primary care physician. A registered nurse will reach out to you by phone within a few days after your discharge to answer any additional questions that you may have after going home. However, at this time we provide for you here, the most important instructions / recommendations at discharge:     Notable Medication Adjustments -   Please start taking Lasix 40 mg by mouth once daily. If you begin to notice an increase in water weight, shortness of breath, or swelling of the legs you may double the dose and please call the cardiology hotline. Please start taking Cozaar 100 mg once daily. Please change the way that you are taking your amlodipine. Please take amlodipine 5 mg by mouth twice daily. Please change the way you are taking your Toprol XL. Please take 50 mg by mouth twice daily  Please stop taking hydrochlorothiazide 25 mg daily  Testing Required after Discharge -   Diana Williamson. The cardiology office will call you tomorrow for scheduling. BMP in 1 week. ** Please contact your PCP to request testing orders for any of the testing recommended here **  Important follow up information -   Please follow-up with cardiology.   You should receive a call tomorrow about scheduling the Lexiscan and follow-up visit. Please follow-up with your primary care physician in 1 week. Follow-up with [facility] was initiated. You may have to call to schedule an appointment. Please follow-up with sleep medicine. You have been referred for an ambulatory sleep study. Please call to make an appointment. Other Instructions -   Please take your medications as prescribed and as indicated on the packaging. Please review this entire after visit summary as additional general instructions including medication list, appointments, activity, diet, any pertinent wound care, and other additional recommendations from your care team that may be provided for you.       Sincerely,     Shiva Blackmon MD

## 2023-09-04 NOTE — ASSESSMENT & PLAN NOTE
Lab Results   Component Value Date    EGFR 58 09/04/2023    EGFR 56 09/03/2023    EGFR 62 09/02/2023    CREATININE 1.46 (H) 09/04/2023    CREATININE 1.51 (H) 09/03/2023    CREATININE 1.39 (H) 09/02/2023   Creatinine 1.30-1.4, currently at baseline  Continue to monitor

## 2023-09-04 NOTE — PLAN OF CARE
Problem: PAIN - ADULT  Goal: Verbalizes/displays adequate comfort level or baseline comfort level  Description: Interventions:  - Encourage patient to monitor pain and request assistance  - Assess pain using appropriate pain scale  - Administer analgesics based on type and severity of pain and evaluate response  - Implement non-pharmacological measures as appropriate and evaluate response  - Consider cultural and social influences on pain and pain management  - Notify physician/advanced practitioner if interventions unsuccessful or patient reports new pain  9/4/2023 1041 by Hany Devries RN  Outcome: Progressing  9/4/2023 1041 by Hany Devries RN  Outcome: Progressing     Problem: INFECTION - ADULT  Goal: Absence or prevention of progression during hospitalization  Description: INTERVENTIONS:  - Assess and monitor for signs and symptoms of infection  - Monitor lab/diagnostic results  - Monitor all insertion sites, i.e. indwelling lines, tubes, and drains  - Monitor endotracheal if appropriate and nasal secretions for changes in amount and color  - Independence appropriate cooling/warming therapies per order  - Administer medications as ordered  - Instruct and encourage patient and family to use good hand hygiene technique  - Identify and instruct in appropriate isolation precautions for identified infection/condition  9/4/2023 1041 by Hany Devries RN  Outcome: Progressing  9/4/2023 1041 by Hany Devries RN  Outcome: Progressing  Goal: Absence of fever/infection during neutropenic period  Description: INTERVENTIONS:  - Monitor WBC    9/4/2023 1041 by Hany Devries RN  Outcome: Progressing  9/4/2023 1041 by Hany Devries RN  Outcome: Progressing     Problem: DISCHARGE PLANNING  Goal: Discharge to home or other facility with appropriate resources  Description: INTERVENTIONS:  - Identify barriers to discharge w/patient and caregiver  - Arrange for needed discharge resources and transportation as appropriate  - Identify discharge learning needs (meds, wound care, etc.)  - Arrange for interpretive services to assist at discharge as needed  - Refer to Case Management Department for coordinating discharge planning if the patient needs post-hospital services based on physician/advanced practitioner order or complex needs related to functional status, cognitive ability, or social support system  9/4/2023 1041 by Kim Islas RN  Outcome: Progressing  9/4/2023 1041 by Kim Islas RN  Outcome: Progressing     Problem: Knowledge Deficit  Goal: Patient/family/caregiver demonstrates understanding of disease process, treatment plan, medications, and discharge instructions  Description: Complete learning assessment and assess knowledge base. Interventions:  - Provide teaching at level of understanding  - Provide teaching via preferred learning methods  9/4/2023 1041 by Kim Islas RN  Outcome: Progressing  9/4/2023 1041 by Kim Islas RN  Outcome: Progressing     Problem: Nutrition/Hydration-ADULT  Goal: Nutrient/Hydration intake appropriate for improving, restoring or maintaining nutritional needs  Description: Monitor and assess patient's nutrition/hydration status for malnutrition. Collaborate with interdisciplinary team and initiate plan and interventions as ordered. Monitor patient's weight and dietary intake as ordered or per policy. Utilize nutrition screening tool and intervene as necessary. Determine patient's food preferences and provide high-protein, high-caloric foods as appropriate.      INTERVENTIONS:  - Monitor oral intake, urinary output, labs, and treatment plans  - Assess nutrition and hydration status and recommend course of action  - Evaluate amount of meals eaten  - Assist patient with eating if necessary   - Allow adequate time for meals  - Recommend/ encourage appropriate diets, oral nutritional supplements, and vitamin/mineral supplements  - Order, calculate, and assess calorie counts as needed  - Recommend, monitor, and adjust tube feedings and TPN/PPN based on assessed needs  - Assess need for intravenous fluids  - Provide specific nutrition/hydration education as appropriate  - Include patient/family/caregiver in decisions related to nutrition  9/4/2023 1041 by Marylu Hernandez RN  Outcome: Progressing  9/4/2023 1041 by Marylu Hernandez RN  Outcome: Progressing     Problem: CARDIOVASCULAR - ADULT  Goal: Maintains optimal cardiac output and hemodynamic stability  Description: INTERVENTIONS:  - Monitor I/O, vital signs and rhythm  - Monitor for S/S and trends of decreased cardiac output  - Administer and titrate ordered vasoactive medications to optimize hemodynamic stability  - Assess quality of pulses, skin color and temperature  - Assess for signs of decreased coronary artery perfusion  - Instruct patient to report change in severity of symptoms  Outcome: Progressing  Goal: Absence of cardiac dysrhythmias or at baseline rhythm  Description: INTERVENTIONS:  - Continuous cardiac monitoring, vital signs, obtain 12 lead EKG if ordered  - Administer antiarrhythmic and heart rate control medications as ordered  - Monitor electrolytes and administer replacement therapy as ordered  Outcome: Progressing

## 2023-09-04 NOTE — ASSESSMENT & PLAN NOTE
Wt Readings from Last 3 Encounters:   09/04/23 135 kg (297 lb 6.4 oz)   01/18/22 (!) 151 kg (332 lb)   06/22/21 (!) 152 kg (335 lb)      Presents with increased shortness of breath, orthopnea, denies weight gain. Patient has lost about 25 pounds. His dry weight appears to be around 297 pounds    Lab Results   Component Value Date     (H) 09/01/2023    LVEF 40 09/01/2023     Plan:  Cardiology consulted  Continue Lasix 40 mg oral  Echo completed. Revealed LVEF 40%. Systolic function moderate reduced. grade 2 DD.   Maintain Mg > 2 and K > 4; Replete as needed  Elevate head of bed to at least 30°, Daily weights, Measure I/Os  Consult nutrition services for dietary education  Sodium less than 2 g, fluids less than 1.8 L  On 1 L NC O2, ambulatory pulse ox ordered

## 2023-09-05 ENCOUNTER — TRANSITIONAL CARE MANAGEMENT (OUTPATIENT)
Dept: FAMILY MEDICINE CLINIC | Facility: CLINIC | Age: 43
End: 2023-09-05

## 2023-09-05 ENCOUNTER — TELEPHONE (OUTPATIENT)
Dept: CARDIOLOGY CLINIC | Facility: CLINIC | Age: 43
End: 2023-09-05

## 2023-09-05 NOTE — UTILIZATION REVIEW
NOTIFICATION OF INPATIENT ADMISSION   AUTHORIZATION REQUEST   SERVICING FACILITY:   [address] [place] [address]  Tax ID: [id number]  NPI: [id number]   ATTENDING PROVIDER:  Attending Name and NPI#: Eric Montalvo Md [[id number]]  Address: [address] [place] [address]  Phone: [phone]     ADMISSION INFORMATION:  Place of Service: Inpatient [address] [place] [address] Code: 21  Inpatient Admission Date/Time: 9/1/23  5:32 AM  Discharge Date/Time: 9/4/2023  2:14 PM  Admitting Diagnosis Code/Description:  Acute heart failure ([address]) [I50.9]  SOB (shortness of breath) [R06.02]     UTILIZATION REVIEW CONTACT:  Laura Mejía Utilization   Network Utilization Review Department  Phone: [phone]  Fax: [phone]  Email: [email or web address]  Contact for approvals/pending authorizations, clinical reviews, and discharge. PHYSICIAN ADVISORY SERVICES:  Medical Necessity Denial & Fsxz-dn-Lfnw Review  Phone: [phone]  Fax: [phone]  Email: [email or web address]           Initial Clinical Review    Admission: Date/Time/Statement:   Admission Orders (From admission, onward)     Ordered        09/01/23 0532  Inpatient Admission  Once                      Orders Placed This Encounter   Procedures   • Inpatient Admission     Standing Status:   Standing     Number of Occurrences:   1     Order Specific Question:   Level of Care     Answer:   Med Surg [16]     Order Specific Question:   Estimated length of stay     Answer:   More than 2 Midnights     Order Specific Question:   Certification     Answer:   I certify that inpatient services are medically necessary for this patient for a duration of greater than two midnights. See H&P and MD Progress Notes for additional information about the patient's course of treatment.      ED Arrival Information     Expected   -    Arrival   9/1/2023 03:22    Acuity   Emergent Means of arrival   Walk-In    Escorted by   Family Member    Service   Hospitalist    Admission type   Emergency            Arrival complaint   SOB           Chief Complaint   Patient presents with   • Shortness of Breath     Pt reports SOB x 2 days "at night" with cough and runny nose today; worsened tonight; denies CP/dizziness; hx htn - no medication x 1 year due to loss of insurance       Initial Presentation: 43 y.o. male from home to ED admitted inpatient due to new heart failure, likely diastolic due to uncontrolled hypertension. Presented due to shortness of breath starting 2 days prior to arrival.  Unable to lay flat.  + cough. Non compliant with medications due to loss of insurance. Drinks 1 pint cognac and smokes 2 blunts marijuana daily. On exam acute distress. Obese. Diaphoretic. Tachycardic. Hypertensive. Tachypnea and accessory muscle use. Lungs rales. Bilateral + 1 edema. Anxious. . Bun 14. Creatinine 1.38 with baseline of 1.3 - 1.4. CxR pulmonary edema. In the ED placed on oxygen 2 liters. Started on Nitropaste and given Lasix and labetalol. Plan is trend troponin. Check echo. Telemetry. Diuresis with IV lasix. Sodium and fluid restriction. Restart metoprolol succinate and hctz. 9/1/23 per Cardiology - patient with hypertensive urgency/acute CHF/flash pulmonary edema/CKD stage 3. Plan is increase antihypertensives. Continue diuresis.  echo    Date: 9/2/23    Day 2: feels better than admission. titrating oxygen down. On exam bilateral trace edema LE. Echocardiogram shows cardiomyopathy with elevated filling pressures and pulmonary hypertension Plan is diuresis. Add losartan. Increase toprol XL to twice daily. Follow BP  Eventual ischemic evaluation.       ED Triage Vitals [09/01/23 0329]   Temperature Pulse Respirations Blood Pressure SpO2   98.1 °F (36.7 °C) (!) 115 (!) 24 (!) 195/126 95 %      Temp Source Heart Rate Source Patient Position - Orthostatic VS BP Location FiO2 (%)   Oral Monitor Sitting Left arm --      Pain Score       No Pain          Wt Readings from Last 1 Encounters:   09/04/23 135 kg (297 lb 6.4 oz)     Additional Vital Signs:   09/02/23 14:57:12 98.7 °F (37.1 °C) 95 18 154/110 Abnormal  125 92 % -- -- -- --   09/02/23 1104 -- -- -- 148/100 -- -- -- -- -- Lying   09/02/23 11:02:27 98.4 °F (36.9 °C) 94 -- 157/109 Abnormal  125 99 % -- -- -- Lying   09/02/23 0752 -- -- -- 162/100 -- 100 % -- -- -- Lying   09/02/23 07:46:03 97.8 °F (36.6 °C) 89 18 163/120 Abnormal  134 99 % 24 1 L/min Nasal cannula Lying   09/02/23 0734 -- -- -- -- -- -- -- -- None (Room air) --   09/01/23 21:56:34 98.1 °F (36.7 °C) 87 -- 164/116 Abnormal  132 95 % -- -- -- --   09/01/23 2130 -- -- -- -- -- 96 % -- -- None (Room air) --   09/01/23 1608 -- -- -- 170/118 Abnormal  -- -- -- -- -- Sitting   09/01/23 15:55:49 97.8 °F (36.6 °C) 91 18 183/127 Abnormal  146 96 % -- -- None (Room air) Sitting   09/01/23 1351 -- 96 -- 159/105 Abnormal  -- -- -- -- -- --   09/01/23 1000 -- -- -- -- -- -- -- -- None (Room air) --   09/01/23 07:06:03 98.3 °F (36.8 °C) 90 -- 159/105 Abnormal  123 91 % -- -- -- --   09/01/23 06:10:19 -- -- -- 161/110 Abnormal  127 -- -- -- -- --   09/01/23 0500 -- 89 20 161/108 Abnormal  129 98 % 28 2 L/min Nasal cannula      Pertinent Labs/Diagnostic Test Results:   XR chest 1 view portable   ED Interpretation by Vanda Cohen MD (09/01 0405)   Diffuse alveolar infiltrates likely 2/2 pulmonary edema      Final Result by Jeff Camp MD (09/01 0720)   Mild cardiomegaly and probable vascular congestion                  Workstation performed: [id number]           9/1/23 ecg  Sinus tachycardia  Left axis deviation  Poor anterior R wave progression is noted  Abnormal ECG  No previous ECGs available    9/1/23 ecg Normal sinus rhythm  Possible Left atrial enlargement  T wave abnormality, consider lateral ischemia  Abnormal ECG  When compared with ECG of 01-SEP-2023 03:36, (unconfirmed)  QRS axis Shifted right  Nonspecific T wave abnormality now evident in Inferior leads  T wave inversion now evident in Anterior leads    9/1/23 ecg Normal sinus rhythm  Possible Left atrial enlargement  T wave abnormality, consider lateral ischemia  Abnormal ECG  When compared with ECG of 01-SEP-2023 06:15, (unconfirmed)  No significant change was found    9/1/23 echo Left Ventricle: Left ventricular cavity size is moderately dilated. Wall thickness is mildly increased. There is eccentric hypertrophy. The left ventricular ejection fraction is 40%. Systolic function is moderately reduced. There is moderate global hypokinesis. Diastolic function is moderately abnormal, consistent with grade II (pseudonormal) relaxation. •  Right Ventricle: Right ventricular cavity size is at the upper limit of normal. Systolic function is normal.  •  Left Atrium: The atrium is moderately dilated. •  Right Atrium: The atrium is mildly dilated. •  Aortic Valve: There is mild regurgitation. •  Mitral Valve: There is moderate regurgitation. •  Tricuspid Valve: There is mild regurgitation.  The estimated right ventricular systolic pressure is 29.63 mmHg    Results from last 7 days   Lab Units 09/01/23  0344   SARS-COV-2  Negative     Results from last 7 days   Lab Units 09/03/23  0513 09/02/23  0503 09/01/23  0344   WBC Thousand/uL 6.97 7.78 9.12   HEMOGLOBIN g/dL 15.3 14.4 13.2   HEMATOCRIT % 47.6 44.8 41.9   PLATELETS Thousands/uL 198 191 189   NEUTROS ABS Thousands/µL  --  5.60 6.53     Results from last 7 days   Lab Units 09/04/23  0602 09/03/23  0513 09/02/23  1154 09/02/23  0503 09/01/23  0344   SODIUM mmol/L 138 139 140 140 139   POTASSIUM mmol/L 3.3* 3.7 3.6 3.0* 3.7   CHLORIDE mmol/L 104 103 100 102 108   CO2 mmol/L 27 30 30 27 23   ANION GAP mmol/L 7 6 10 11 8   BUN mg/dL 17 13 12 11 14   CREATININE mg/dL 1.46* 1.51* 1.39* 1.33* 1.38*   EGFR ml/min/1.73sq m 58 56 62 65 62   CALCIUM mg/dL 9.7 9.7 9.8 9.0 9.0   MAGNESIUM mg/dL  --   --   --  2.1 2.2   PHOSPHORUS mg/dL  --   --   --  3.0  --      Results from last 7 days   Lab Units 09/02/23  0503 09/01/23  0344   AST U/L 24 44*   ALT U/L 21 32   ALK PHOS U/L 70 75   TOTAL PROTEIN g/dL 7.3 7.4   ALBUMIN g/dL 4.2 4.4   TOTAL BILIRUBIN mg/dL 1.29* 0.66     Results from last 7 days   Lab Units 09/04/23  0602 09/03/23  0513 09/02/23  1154 09/02/23  0503 09/01/23  0344   GLUCOSE RANDOM mg/dL 103 112 104 104 109     Results from last 7 days   Lab Units 09/01/23  0344   PH CLRAISSA  7.433*   PCO2 CLARISSA mm Hg 36.2*   PO2 CLARISSA mm Hg 107.1*   HCO3 CLARISSA mmol/L 23.7*   BASE EXC CLARISSA mmol/L -0.2   O2 CONTENT CLARISSA ml/dL 19.5   O2 HGB, VENOUS % 94.9*     Results from last 7 days   Lab Units 09/01/23  0903 09/01/23  0344   HS TNI 0HR ng/L  --  21   HS TNI 4HR ng/L 22  --    HSTNI D4 ng/L 1  --      Results from last 7 days   Lab Units 09/01/23  0344   LACTIC ACID mmol/L 1.2     Results from last 7 days   Lab Units 09/01/23  0344   BNP pg/mL 713*     Results from last 7 days   Lab Units 09/01/23  0410   CLARITY UA  Clear   COLOR UA  Light Yellow   SPEC GRAV UA  1.021   PH UA  6.0   GLUCOSE UA mg/dl Negative   KETONES UA mg/dl Negative   BLOOD UA  Negative   PROTEIN UA mg/dl Trace*   NITRITE UA  Negative   BILIRUBIN UA  Negative   UROBILINOGEN UA (BE) mg/dl <2.0   LEUKOCYTES UA  Negative   WBC UA /hpf 2-4*   RBC UA /hpf None Seen   BACTERIA UA /hpf None Seen   EPITHELIAL CELLS WET PREP /hpf None Seen   MUCUS THREADS  Occasional*     Results from last 7 days   Lab Units 09/01/23  0344   INFLUENZA A PCR  Negative   INFLUENZA B PCR  Negative   RSV PCR  Negative       ED Treatment:   Medication Administration from 09/01/2023 0322 to 09/01/2023 0553       Date/Time Order Dose Route Action Comments     09/01/2023 0349 EDT nitroglycerin (NITRO-BID) 2 % TD ointment 0.5 inch 0.5 inch Topical Given --     09/01/2023 1810 EDT labetalol (NORMODYNE) injection 20 mg 20 mg Intravenous Given --     09/01/2023 0445 EDT furosemide (LASIX) injection 20 mg 20 mg Intravenous Given --        Past Medical History:   Diagnosis Date   • Hypertension    • Morbid obesity with BMI of 40.0-44.9, adult ([address])      Present on Admission:  • Stage 3 chronic kidney disease ([address])  • Essential hypertension      Admitting Diagnosis: Acute heart failure (HCC) [I50.9]  SOB (shortness of breath) [R06.02]  Age/Sex: 43 y.o. male  Admission Orders:  09/01/23 0532 inpatient   Scheduled Medications:  amLODIPine, 5 mg, Oral, BID  enoxaparin, 40 mg, Subcutaneous, BID  furosemide, 40 mg, Intravenous, BID (diuretic)  losartan, 50 mg, Oral, Daily  metoprolol succinate, 25 mg, Oral, BID  nicotine, 1 patch, Transdermal, Daily    amLODIPine (NORVASC) tablet 5 mg  Dose: 5 mg  Freq: Daily Route: PO  Start: 09/02/23 0900 End: 09/02/23 1635  metoprolol succinate (TOPROL-XL) 24 hr tablet 25 mg  Dose: 25 mg  Freq: Daily Route: PO  Start: 09/01/23 0530 End: 09/02/23 0746    potassium chloride (K-DUR,KLOR-CON) CR tablet 40 mEq  Dose: 40 mEq  Freq: Once Route: PO  Start: 09/02/23 1400 End: 09/02/23 1456  potassium chloride (K-DUR,KLOR-CON) CR tablet 40 mEq  Dose: 40 mEq  Freq: Once Route: PO  Start: 09/02/23 0930 End: 09/02/23 0948  potassium chloride (K-DUR,KLOR-CON) CR tablet 40 mEq  Dose: 40 mEq  Freq: Once Route: PO  Start: 09/02/23 0830 End: 09/02/23 0832    Continuous IV Infusions:  No current facility-administered medications for this encounter. PRN Meds:  acetaminophen, 650 mg, Oral, Q6H PRN x 1 9/1  metoprolol, 2.5 mg, Intravenous, Q6H PRN x 1 9/1  nitroglycerin, 0.4 mg, Sublingual, Q5 Min PRN      Telemetry   Fluid restriction     IP CONSULT TO CARDIOLOGY    Network Utilization Review Department  ATTENTION: Please call with any questions or concerns to [phone] and carefully listen to the prompts so that you are directed to the right person.  All voicemails are confidential.  Erica Childress all requests for admission clinical reviews, approved or denied determinations and any other requests to dedicated fax number below belonging to the campus where the patient is receiving treatment.  List of dedicated fax numbers for the Facilities:  [place] DENIALS (Administrative/Medical Necessity) [phone] [address] (Maternity/NICU/Pediatrics) [phone]   [address] [place] [address] [phone]   [facility] [address] [facility] [phone]   [facility] [address] [place] [address] [phone]   [id number] [place] [address] [facility] [phone]

## 2023-09-06 NOTE — UTILIZATION REVIEW
NOTIFICATION OF ADMISSION DISCHARGE   This is a Notification of Discharge from [address] [place] [address]. Please be advised that this patient has been discharge from our facility. Below you will find the admission and discharge date and time including the patient’s disposition. UTILIZATION REVIEW CONTACT:  Korin Johnson  Utilization   Network Utilization Review Department  Phone: [phone] x carefully listen to the prompts. All voicemails are confidential.  Email: [email or web address]. [employer]     ADMISSION INFORMATION  PRESENTATION DATE: 9/1/2023  3:26 AM    INPATIENT ADMISSION DATE: 9/1/23  5:32 AM   DISCHARGE DATE: 9/4/2023  2:14 PM   DISPOSITION:Home/Self Care    IMPORTANT INFORMATION:  Send all requests for admission clinical reviews, approved or denied determinations and any other requests to dedicated fax number below belonging to the campus where the patient is receiving treatment.  List of dedicated fax numbers:  [address] [facility] (Administrative/Medical Necessity) [phone]   [facility] [phone]   [address] [facility] [phone]   [facility] [phone] [address] [facility] [phone]   [address] [place] [address] [address] [place] [address] [phone]   [address] [place] [address] [phone] 3[address] [facility] [phone] 2720 [facility] [address] [phone]           Micaela Roa MD  Resident  Hospitalist  Discharge Summary     Attested  Date of Service:  9/4/2023  8:43 AM     Attested            Attestation signed by Hansa Villegas MD at 9/4/2023  3:35 PM     [facility] Hospitalist Service Attending Physician Attestation Note - Discharge     I have seen and examined Andrea Bartholomew personally and have reviewed the medical record independently. I have reviewed the case with the resident physician including all assessments and the plan of care for each. I agree with the resident physician and offer the following addendum to the below statements by the resident physician:      Date Evaluated: 9/4/2023  Time Evaluated: A.m.     Patient is a very pleasant 79-year-old male presented emergency department on 9/1/2023 due to shortness of breath, orthopnea and worsening dyspnea on exertion. Patient was subsequent evaluated in the emergency department, he was noted to have signs of acute heart failure. He was admitted and seen by cardiology. Echocardiogram showed ejection fraction of 40%. Patient admitted to noncompliance with antihypertensive medications. Patient was treated with IV diuretics and antihypertensives. He became euvolemic and his symptoms have resolved. He is being discharged home with p.o. diuretics. And outpatient follow-up with cardiology for [facility].     Discharge Statement:  I spent 35 minutes discharging the patient. This time was spent on the day of discharge. I had direct contact with the patient on the day of discharge. Greater than 50% of the total time was spent examining patient, answering all patient questions, arranging and discussing plan of care with patient as well as directly providing post-discharge instructions.   Additional time then spent on discharge activities.     For detailed history, assessment, and plan of care, please review the statements below by resident.  Isaura Martel MD [address] [facility]  Discharge- Scottie Gaviria 1980, 43 y.o. male MRN: [id number]  Unit/Bed#: S -01 Encounter: [id number]  Primary Care Provider: Tamiko Shipley MD   Date and time admitted to hospital: 9/1/2023  3:26 AM     * Acute heart failure [facility])  Assessment & Plan      Wt Readings from Last 3 Encounters:   09/04/23 135 kg (297 lb 6.4 oz)   01/18/22 (!) 151 kg (332 lb)   06/22/21 (!) 152 kg (335 lb)      Presents with increased shortness of breath, orthopnea, denies weight gain. Patient has lost about 25 pounds. His dry weight appears to be around 297 pounds     Lab Results   Component Value Date      (H) 09/01/2023     LVEF 40 09/01/2023      Plan:  Cardiology consulted  Continue Lasix 40 mg oral  Echo completed. Revealed LVEF 40%. Systolic function moderate reduced. grade 2 DD. Potassium mildly decreased. Will prescribe 5 days of K-Dur 40 mEq.   We will give 1 dose K-Dur 40 mEq before he goes.           Essential hypertension  Assessment & Plan  Patient previously used to take amlodipine, hydrochlorothiazide, metoprolol however he has lost insurance  Currently on Medicaid        Plan:  Cardiology following  Continue Toprol-XL 50 mg BID, amlodipine 5 mg twice daily, and Losartan 100 mg daily due to cardiomyopathy     Stage 3 chronic kidney disease [facility])  Assessment & Plan        Lab Results   Component Value Date     EGFR 58 09/04/2023     EGFR 56 09/03/2023     EGFR 62 09/02/2023     CREATININE 1.46 (H) 09/04/2023     CREATININE 1.51 (H) 09/03/2023     CREATININE 1.39 (H) 09/02/2023   Creatinine 1.30-1.4, currently at baseline  Continue to monitor     Morbid obesity with BMI of 40.0-44.9, adult [facility])  Assessment & Plan  Encouraged lifestyle modification         Medical Problems      Resolved Problems  Date Reviewed: 9/3/2023   None         Discharging Resident: Irina Klein MD  Discharging Attending: Luis Patten MD  PCP: Ysabel Mcdaniel MD  Admission Date:       Admission Orders (From admission, onward)       Ordered         09/01/23 0532   Inpatient Admission  Once                         Discharge Date: 09/04/23     Consultations During Hospital Stay:  • Cardiology     Procedures Performed:   • Echo     Significant Findings / Test Results:   • Echocardiogram revealing cardiomyopathy with elevated filling pressures and pulmonary hypertension. Left ventricular ejection fraction 40%.    Incidental Findings:   • None     Test Results Pending at Discharge (will require follow up): • None     Outpatient Tests Requested:  • BMP in 1 week after discharge     Complications: None     Reason for Admission: Acute heart failure     Hospital Course:   Marli Guo is a 43 y.o. male patient with a past medical history of hypertension, obesity, & stage III CKD who originally presented to the hospital on 9/1/2023 due to a 2-day history of orthopnea, worsening dyspnea with exertion, and dry cough. Archana Garcia reported being noncompliant with his hypertension medications due to previous insurance issues. In the ED, vitals were significant for a blood pressure of 195/196. He was given 1 dose of labetalol and his blood pressure went to 167/102. Labs were significant for a BNP of 713. Creatinine was at baseline of 1.38. Troponins were negative. checks x-ray revealed pulmonary congestion and mild cardiomegaly. Archana Garcia was diagnosed with an acute heart failure exacerbation. An echo was performed and revealed cardiomyopathy, pulmonary hypertension, and LVEF 40%.   He was restarted on his home hypertension medications of Toprol XL 25 mg twice daily and amlodipine 5 mg daily. He was started on IV Lasix 40 mg twice daily. Cardiology was consulted and they began losartan 50 mg daily due to his cardiomyopathy. He required 2 L O2 nasal cannula initially but was eventually weaned down to room air. Per cardiology, patient will need eventual Lexiscan to rule out ischemia. He was transitioned to Lasix 40 mg oral.  On discharge his BP regimen is: Toprol XL 50 mg twice daily, losartan 100 mg daily, amlodipine 5 mg twice daily, and Lasix 40 mg daily. Weight on admission was 322 lb; weight on discharge was 297.5 lb. We will give 5 days of K-Dur 40 mEq given hypokalemia. Recheck BMP in 1 week        Please see above list of diagnoses and related plan for additional information.      Condition at Discharge: stable     Discharge Day Visit / Exam:   Subjective: He feels well. Looks forward to possibly having Labor Day barbecue event. He states he feels ready to go home today. Vitals: Blood Pressure: 133/96 (09/04/23 0707)  Pulse: 81 (09/04/23 0707)  Temperature: 98.3 °F (36.8 °C) (09/04/23 0707)  Temp Source: Oral (09/04/23 0706)  Respirations: 17 (09/04/23 0707)  Height: 6' 3" (190.5 cm) (09/01/23 1351)  Weight - Scale: 135 kg (297 lb 6.4 oz) (09/04/23 0600)  SpO2: 91 % (09/04/23 0707)  Exam:   Physical Exam  Vitals and nursing note reviewed. Constitutional:       General: He is not in acute distress. Appearance: He is obese. He is not ill-appearing. HENT:      Mouth/Throat:      Mouth: Mucous membranes are moist.   Eyes:      General: No scleral icterus. Conjunctiva/sclera: Conjunctivae normal.   Cardiovascular:      Rate and Rhythm: Normal rate and regular rhythm. Pulses: Normal pulses. Heart sounds: No murmur heard. Pulmonary:      Effort: Pulmonary effort is normal. No respiratory distress. Breath sounds: Normal breath sounds. No wheezing or rales. Abdominal:      Palpations: Abdomen is soft. Tenderness: There is no abdominal tenderness. Musculoskeletal:         General: No swelling. Right lower leg: No edema. Left lower leg: No edema. Skin:     General: Skin is warm. Findings: No rash. Neurological:      General: No focal deficit present. Mental Status: He is alert and oriented to person, place, and time. Psychiatric:         Mood and Affect: Mood normal.         Behavior: Behavior normal.            Discussion with Family: Patient declined call to .      Discharge instructions/Information to patient and family:   See after visit summary for information provided to patient and family.       Provisions for Follow-Up Care:  See after visit summary for information related to follow-up care and any pertinent home health orders.        Disposition:   Home     Planned Readmission: No     Discharge Medications:  See after visit summary for reconciled discharge medications provided to patient and/or family.       **Please Note: This note may have been constructed using a voice recognition system**                    Cosigned by: Eve Alonso MD at 9/4/2023  3:35 PM     Revision History

## 2023-09-07 PROBLEM — F12.90 MARIJUANA USE: Chronic | Status: ACTIVE | Noted: 2021-06-22

## 2023-09-07 PROBLEM — I10 ESSENTIAL HYPERTENSION: Chronic | Status: ACTIVE | Noted: 2019-06-26

## 2023-09-07 PROBLEM — F17.200 TOBACCO DEPENDENCE: Chronic | Status: ACTIVE | Noted: 2021-06-22

## 2023-09-07 PROBLEM — N18.30 STAGE 3 CHRONIC KIDNEY DISEASE (HCC): Chronic | Status: ACTIVE | Noted: 2019-06-26

## 2023-09-07 PROBLEM — F10.10 ALCOHOL ABUSE: Chronic | Status: ACTIVE | Noted: 2021-06-22

## 2023-09-07 PROBLEM — I50.22 CHRONIC HFREF (HEART FAILURE WITH REDUCED EJECTION FRACTION) (HCC): Chronic | Status: ACTIVE | Noted: 2023-09-01

## 2023-09-07 NOTE — PROGRESS NOTES
General Cardiology Outpatient Progress Note    Colton Alexandra 43 y.o. male   MRN: [id number]  Encounter: [id number]    Assessment:  Patient Active Problem List    Diagnosis Date Noted   • Chronic HFrEF (heart failure with reduced ejection fraction) ([address] [facility]) 09/01/2023   • Morbid obesity with BMI of 40.0-44.9, adult ([facility] [address] [facility]) 06/22/2021   • Tobacco dependence 06/22/2021   • Alcohol abuse 06/22/2021   • Tachycardia 06/22/2021   • Marijuana use 06/22/2021   • Abnormal EKG 06/22/2021   • Stage 3 chronic kidney disease ([address] [facility]) 06/26/2019   • Essential hypertension 06/26/2019   • Persistent proteinuria 06/26/2019       Today's Plan:  • Volume mildly up on exam. Continue current Lasix dose and will start spironolactone 25 mg daily. o Stop potassium supplementation. o Repeat BMP before next cardiology visit. • Stop amlodipine. • Nuclear stress test ordered for ischemic evaluation. • Will price check ARNi and SGLT2i before next visit. Consider starting if appropriate/afforadable. • Provided patient with "Living With Heart Failure" booklet and "Don't Pass the Salt" cookbook. • Congratulated on abstaining from EtOH. Advised outpatient resources are available to assist with this should he need. Plan:  Newly diagnosed HFrEF; LVEF 40%; LVIDd 6.6 cm; NYHA II; ACC/AHA Stage C   Etiology: unclear. Needs ischemic evaluation. Possible HTN heart disease given poorly controlled BP in setting of stopping medications +/- contribution from daily EtOH use. TTE 09/01/2023: LVEF 40%. LVIDd 6.6 cm. Grade 2 DD. RV size at ULN. Normal RVSF. Mild AI. Moderate MR. Mild TR. Dilated IVC. Weight of 297 lbs on 09/04 (day of discharge). Today, weighs 305 lbs. Most recent BMP from 09/04/2023: sodium 138; potassium 3.3; BUN 17; creatinine 1.46; eGFR 58. Pharmacotherapies / Neurohormonal Blockade:  --Beta Blocker: metoprolol succinate 50 mg q12 hours. --ARNi / ACEi / ARB: losartan 100 mg daily.   --Aldosterone Antagonist: spironolactone 25 mg daily. --SGLT2 Inhibitor: No.   --Diuretic: Lasix 40 mg daily. Sudden Cardiac Death Risk Reduction:  --LVEF 40%. --Plan to reassess LVEF with limited TTE after 3-4 months of uninterrupted and optimized HF GDMT (earliest 12/2023). Electrical Resynchronization:  --Candidacy for BiV device: narrow QRS. Advanced Therapies: Will continue to monitor. Hypertension   BP of 126/88 mmHg in office today. Continues on medications as above. Chronic kidney disease, stage IIIa   Baseline creatinine of 1.3-1.5. Most recent BMP from 09/04/2023: sodium 138; potassium 3.3; BUN 17; creatinine 1.46; eGFR 58. History of daily alcohol abuse: has abstained since 09/2023 admission. Tobacco abuse: now smoking less than 1 cigarette daily. Marijuana use  Concern for LAURIE: referral to sleep medicine active in chart. HPI:   Lottie Zambrano is a 44-year-old man with a PMH as above who presents to the office for hospital follow-up and to establish with practice. Admitted to [facility] from 09/01 to 09/04/2023 after presenting with worsening SOB and new orthopnea. Lost insurance about 1 year prior and had not been taking prescribed medications. Also reported daily EtOH use (1 pint cognac daily).  (no priors). Troponins 21-->22. CXR with "Mild cardiomegaly and probable vascular congestion." BP in ED of 195/126 mmHg. Given nitropaste and IV labetalol. Also started on IV Lasix, and cardiology consulted. TTE revealed new LVEF 40%. Home meds (BB and amlodipine added back). Transitioned to PO Lasix on day of discharge. Patient did not want to remain in hospital and requested outpatient work-up of new HFrEF. Lost >20 lbs and net negative 2.9 L this admission. 09/08/2023: Patient presents for hospital follow-up and to establish with practice. No current complaints other than fatigue. Reviewed hospital course and provided education on low sodium diet and fluid restriction.  Is completing daily weights; notes 3 lbs weight gain on home scale over past 4-5 days. Denies LE swelling, SOB, WADE, PND, and orthopnea. Reviewed patient's medical, family, and social history; EMR updated as appropriate. Since discharge, has been abstaining from EtOH completely and only smoked 2 cigarettes in total.     Past Medical History:   Diagnosis Date   • Alcohol abuse, daily use    • Chronic HFrEF (heart failure with reduced ejection fraction) ([facility]) 09/01/2023   • Chronic kidney disease (CKD)    • Hypertension    • Tobacco abuse        Review of Systems   Constitutional: Positive for fatigue. Negative for activity change, appetite change and unexpected weight change. Eyes: Negative. Respiratory: Negative for cough, chest tightness and shortness of breath. Cardiovascular: Negative for chest pain, palpitations and leg swelling. Gastrointestinal: Negative for abdominal distention, abdominal pain, diarrhea and nausea. Endocrine: Negative. Genitourinary: Negative for decreased urine volume. Musculoskeletal: Negative. Skin: Negative. Allergic/Immunologic: Negative. Neurological: Negative for dizziness, syncope, weakness and light-headedness. Psychiatric/Behavioral: Negative for confusion and sleep disturbance. The patient is not nervous/anxious.         No Known Allergies    Current Outpatient Medications:   •  furosemide (LASIX) 40 mg tablet, Take 1 tablet (40 mg total) by mouth daily Do not start before September 5, 2023., Disp: 30 tablet, Rfl: 0  •  losartan (COZAAR) 100 MG tablet, Take 1 tablet (100 mg total) by mouth daily Do not start before September 5, 2023., Disp: 30 tablet, Rfl: 0  •  metoprolol succinate (TOPROL-XL) 50 mg 24 hr tablet, Take 1 tablet (50 mg total) by mouth 2 (two) times a day, Disp: 60 tablet, Rfl: 0  •  spironolactone (ALDACTONE) 25 mg tablet, Take 1 tablet (25 mg total) by mouth daily, Disp: 30 tablet, Rfl: 1    Social History     Socioeconomic History   • Marital status: /Civil Union     Spouse name: Rosalind Evans   • Number of children: 1   • Years of education: Not on file   • Highest education level: Not on file   Occupational History   • Not on file   Tobacco Use   • Smoking status: Every Day     Packs/day: 0.25     Types: Cigarettes   • Smokeless tobacco: Never   Vaping Use   • Vaping Use: Never used   Substance and Sexual Activity   • Alcohol use: Not Currently     Alcohol/week: 56.0 standard drinks of alcohol     Types: 56 Shots of liquor per week     Comment: Previously drinking pint of liquor (cognac) daily; abstaining since 09/2023 (Updated 09/08/2023). • Drug use: Yes     Types: Marijuana   • Sexual activity: Not on file   Other Topics Concern   • Not on file   Social History Narrative   • Not on file     Social Determinants of Health     Financial Resource Strain: Not on file   Food Insecurity: Not on file   Transportation Needs: Not on file   Physical Activity: Not on file   Stress: Not on file   Social Connections: Not on file   Intimate Partner Violence: Not on file   Housing Stability: Not on file     Family History   Problem Relation Age of Onset   • Heart disease Mother    • Hypertension Mother    • Heart failure Mother    • Substance Abuse Mother    • No Known Problems Father    • No Known Problems Sister    • No Known Problems Sister    • Hypertension Brother    • No Known Problems Maternal Grandmother    • No Known Problems Maternal Grandfather    • No Known Problems Paternal Grandmother    • No Known Problems Paternal Grandfather    • No Known Problems Daughter    • Sudden death Neg Hx        Vitals:   Blood pressure 126/88, pulse 91, height 6' 3" (1.905 m), weight (!) 138 kg (305 lb), SpO2 97 %.     Wt Readings from Last 10 Encounters:   09/08/23 (!) 138 kg (305 lb)   09/04/23 135 kg (297 lb 6.4 oz)   01/18/22 (!) 151 kg (332 lb)   06/22/21 (!) 152 kg (335 lb)   06/13/21 (!) 152 kg (334 lb 1.6 oz)   06/26/19 (!) 144 kg (318 lb 6.4 oz)     Vitals: 09/08/23 1334   BP: 126/88   BP Location: Left arm   Patient Position: Sitting   Cuff Size: Large   Pulse: 91   SpO2: 97%   Weight: (!) 138 kg (305 lb)   Height: 6' 3" (1.905 m)       Physical Exam  Vitals reviewed. Constitutional:       General: He is awake. He is not in acute distress. Appearance: Normal appearance. He is well-developed and overweight. He is not ill-appearing, toxic-appearing or diaphoretic. HENT:      Head: Normocephalic. Nose: Nose normal.      Mouth/Throat:      Mouth: Mucous membranes are moist.   Eyes:      General: No scleral icterus. Conjunctiva/sclera: Conjunctivae normal.   Neck:      Vascular: JVD present. Trachea: No tracheal deviation. Cardiovascular:      Rate and Rhythm: Normal rate and regular rhythm. Heart sounds: No murmur heard. Pulmonary:      Effort: Pulmonary effort is normal. No tachypnea, bradypnea or respiratory distress. Breath sounds: Normal air entry. No decreased air movement. No decreased breath sounds, wheezing or rales. Abdominal:      General: Bowel sounds are normal. There is distension. Palpations: Abdomen is soft. Tenderness: There is no abdominal tenderness. Musculoskeletal:      Cervical back: Neck supple. Right lower leg: Edema (trace) present. Left lower leg: Edema (trace) present. Skin:     General: Skin is warm and dry. Coloration: Skin is not jaundiced or pale. Neurological:      General: No focal deficit present. Mental Status: He is alert and oriented to person, place, and time. Psychiatric:         Attention and Perception: Attention normal.         Mood and Affect: Mood and affect normal.         Speech: Speech normal.         Behavior: Behavior normal. Behavior is cooperative. Thought Content:  Thought content normal.       Labs & Results:  Lab Results   Component Value Date    WBC 6.97 09/03/2023    HGB 15.3 09/03/2023    HCT 47.6 09/03/2023     (H) 09/03/2023  09/03/2023     Lab Results   Component Value Date    SODIUM 138 09/04/2023    K 3.3 (L) 09/04/2023     09/04/2023    CO2 27 09/04/2023    BUN 17 09/04/2023    CREATININE 1.46 (H) 09/04/2023    GLUC 103 09/04/2023    CALCIUM 9.7 09/04/2023     No results found for: "INR", "PROTIME"     No results found for: "NTBNP"     Lab Results   Component Value Date     (H) 09/01/2023      Michelle Anna PA-C

## 2023-09-08 ENCOUNTER — OFFICE VISIT (OUTPATIENT)
Dept: CARDIOLOGY CLINIC | Facility: CLINIC | Age: 43
End: 2023-09-08
Payer: COMMERCIAL

## 2023-09-08 VITALS
HEART RATE: 91 BPM | DIASTOLIC BLOOD PRESSURE: 88 MMHG | SYSTOLIC BLOOD PRESSURE: 126 MMHG | HEIGHT: 75 IN | WEIGHT: 305 LBS | BODY MASS INDEX: 37.92 KG/M2 | OXYGEN SATURATION: 97 %

## 2023-09-08 DIAGNOSIS — I50.22 CHRONIC HFREF (HEART FAILURE WITH REDUCED EJECTION FRACTION) (HCC): ICD-10-CM

## 2023-09-08 DIAGNOSIS — N18.31 STAGE 3A CHRONIC KIDNEY DISEASE (HCC): ICD-10-CM

## 2023-09-08 DIAGNOSIS — I10 PRIMARY HYPERTENSION: ICD-10-CM

## 2023-09-08 DIAGNOSIS — Z09 HOSPITAL DISCHARGE FOLLOW-UP: Primary | ICD-10-CM

## 2023-09-08 PROCEDURE — 99214 OFFICE O/P EST MOD 30 MIN: CPT | Performed by: PHYSICIAN ASSISTANT

## 2023-09-08 RX ORDER — SPIRONOLACTONE 25 MG/1
25 TABLET ORAL DAILY
Qty: 30 TABLET | Refills: 1 | Status: SHIPPED | OUTPATIENT
Start: 2023-09-08

## 2023-09-08 NOTE — PATIENT INSTRUCTIONS
Stop amlodipine. Stop potassium pills.  spironolactone from pharmacy. Begin taking 25 mg once day. Schedule and complete stress test.   Complete blood work in 7-10 days No need to fast.     Please weigh yourself every day (after emptying your bladder) and keep a detailed log of weights. Contact the Heart Failure program at [phone] if you gain 3+ lbs overnight or 5+ lbs in 5-7 days. Limit daily sodium/salt intake to 2000 mg daily to prevent fluid retention. Avoid canned foods, fast food/Chinese food, and processed meats (hot dogs, lunch meat, and sausage etc.). Caution with condiments. Limit fluid intake to 2000 mL or 2 liters (about 60-65 ounces) daily. Avoid electrolyte replacement drinks (such as Gatorade, Pedialyte, Propel, Liquid IV, etc.). Bring complete list of medications and log of daily weights to your follow-up appointment.

## 2023-09-12 ENCOUNTER — HOSPITAL ENCOUNTER (OUTPATIENT)
Dept: NON INVASIVE DIAGNOSTICS | Facility: CLINIC | Age: 43
Discharge: HOME | End: 2023-09-12
Payer: COMMERCIAL

## 2023-09-12 VITALS
HEIGHT: 75 IN | OXYGEN SATURATION: 97 % | WEIGHT: 305 LBS | SYSTOLIC BLOOD PRESSURE: 140 MMHG | HEART RATE: 77 BPM | DIASTOLIC BLOOD PRESSURE: 94 MMHG | BODY MASS INDEX: 37.92 KG/M2

## 2023-09-12 DIAGNOSIS — I50.22 CHRONIC HFREF (HEART FAILURE WITH REDUCED EJECTION FRACTION) (HCC): ICD-10-CM

## 2023-09-12 LAB
MAX HR PERCENT: 88 %
MAX HR: 157 BPM
NUC STRESS EJECTION FRACTION: 27 %
RATE PRESSURE PRODUCT: NORMAL
SL CV REST NUCLEAR ISOTOPE DOSE: 15.66 MCI
SL CV STRESS NUCLEAR ISOTOPE DOSE: 46.9 MCI
SL CV STRESS RECOVERY BP: NORMAL MMHG
SL CV STRESS RECOVERY HR: 99 BPM
SL CV STRESS RECOVERY O2 SAT: 98 %
SL CV STRESS STAGE REACHED: 4
STRESS ANGINA INDEX: 0
STRESS BASELINE BP: NORMAL MMHG
STRESS BASELINE HR: 77 BPM
STRESS O2 SAT REST: 97 %
STRESS PEAK HR: 157 BPM
STRESS POST ESTIMATED WORKLOAD: 13.4 METS
STRESS POST EXERCISE DUR MIN: 10 MIN
STRESS POST EXERCISE DUR SEC: 30 SEC
STRESS POST O2 SAT PEAK: 96 %
STRESS POST PEAK BP: 192 MMHG
STRESS/REST PERFUSION RATIO: 0.97

## 2023-09-12 PROCEDURE — 93018 CV STRESS TEST I&R ONLY: CPT | Performed by: INTERNAL MEDICINE

## 2023-09-12 PROCEDURE — A9502 TC99M TETROFOSMIN: HCPCS

## 2023-09-12 PROCEDURE — 78452 HT MUSCLE IMAGE SPECT MULT: CPT | Performed by: INTERNAL MEDICINE

## 2023-09-12 PROCEDURE — 78452 HT MUSCLE IMAGE SPECT MULT: CPT

## 2023-09-12 PROCEDURE — 93017 CV STRESS TEST TRACING ONLY: CPT

## 2023-09-12 PROCEDURE — 93016 CV STRESS TEST SUPVJ ONLY: CPT | Performed by: INTERNAL MEDICINE

## 2023-09-12 PROCEDURE — G1004 CDSM NDSC: HCPCS

## 2023-09-13 ENCOUNTER — TELEPHONE (OUTPATIENT)
Dept: CARDIOLOGY CLINIC | Facility: CLINIC | Age: 43
End: 2023-09-13

## 2023-09-13 NOTE — TELEPHONE ENCOUNTER
Both Entresto and jardiance should be covered for an estimated $3.00 through [employer]

## 2023-09-14 LAB
ARRHY DURING EX: NORMAL
CHEST PAIN STATEMENT: NORMAL
MAX DIASTOLIC BP: 82 MMHG
MAX HEART RATE: 157 BPM
MAX PREDICTED HEART RATE: 178 BPM
MAX. SYSTOLIC BP: 192 MMHG
PROTOCOL NAME: NORMAL
REASON FOR TERMINATION: NORMAL
TARGET HR FORMULA: NORMAL
TEST INDICATION: NORMAL
TIME IN EXERCISE PHASE: NORMAL

## 2023-10-02 ENCOUNTER — OFFICE VISIT (OUTPATIENT)
Dept: FAMILY MEDICINE CLINIC | Facility: CLINIC | Age: 43
End: 2023-10-02
Payer: COMMERCIAL

## 2023-10-02 VITALS
OXYGEN SATURATION: 98 % | RESPIRATION RATE: 16 BRPM | WEIGHT: 303 LBS | DIASTOLIC BLOOD PRESSURE: 98 MMHG | HEART RATE: 92 BPM | HEIGHT: 75 IN | BODY MASS INDEX: 37.67 KG/M2 | SYSTOLIC BLOOD PRESSURE: 140 MMHG

## 2023-10-02 DIAGNOSIS — F10.10 ALCOHOL ABUSE: Chronic | ICD-10-CM

## 2023-10-02 DIAGNOSIS — F17.200 TOBACCO DEPENDENCE: Chronic | ICD-10-CM

## 2023-10-02 DIAGNOSIS — I10 PRIMARY HYPERTENSION: ICD-10-CM

## 2023-10-02 DIAGNOSIS — I50.9 ACUTE EXACERBATION OF CHF (CONGESTIVE HEART FAILURE) (HCC): ICD-10-CM

## 2023-10-02 DIAGNOSIS — I10 ESSENTIAL HYPERTENSION: Chronic | ICD-10-CM

## 2023-10-02 DIAGNOSIS — I50.22 CHRONIC HFREF (HEART FAILURE WITH REDUCED EJECTION FRACTION) (HCC): ICD-10-CM

## 2023-10-02 DIAGNOSIS — I50.22 CHRONIC HFREF (HEART FAILURE WITH REDUCED EJECTION FRACTION) (HCC): Primary | Chronic | ICD-10-CM

## 2023-10-02 PROBLEM — E66.01 MORBID OBESITY WITH BMI OF 40.0-44.9, ADULT (HCC): Status: RESOLVED | Noted: 2021-06-22 | Resolved: 2023-10-02

## 2023-10-02 PROCEDURE — 99214 OFFICE O/P EST MOD 30 MIN: CPT | Performed by: FAMILY MEDICINE

## 2023-10-02 RX ORDER — SPIRONOLACTONE 25 MG/1
25 TABLET ORAL DAILY
Qty: 90 TABLET | Refills: 1 | Status: SHIPPED | OUTPATIENT
Start: 2023-10-02

## 2023-10-02 RX ORDER — METOPROLOL SUCCINATE 50 MG/1
50 TABLET, EXTENDED RELEASE ORAL 2 TIMES DAILY
Qty: 60 TABLET | Refills: 0 | Status: SHIPPED | OUTPATIENT
Start: 2023-10-02 | End: 2023-11-01

## 2023-10-02 RX ORDER — NICOTINE 21 MG/24HR
1 PATCH, TRANSDERMAL 24 HOURS TRANSDERMAL EVERY 24 HOURS
Qty: 28 PATCH | Refills: 0 | Status: SHIPPED | OUTPATIENT
Start: 2023-10-02

## 2023-10-02 RX ORDER — LOSARTAN POTASSIUM 100 MG/1
100 TABLET ORAL DAILY
Qty: 30 TABLET | Refills: 0 | Status: SHIPPED | OUTPATIENT
Start: 2023-10-02 | End: 2023-11-01

## 2023-10-02 RX ORDER — FUROSEMIDE 40 MG/1
40 TABLET ORAL DAILY
Qty: 30 TABLET | Refills: 0 | Status: SHIPPED | OUTPATIENT
Start: 2023-10-02 | End: 2023-11-01

## 2023-10-02 NOTE — PROGRESS NOTES
Name: Arley Sloan      : 1980      MRN: [id number]  Encounter Provider: Jasbir Black MD  Encounter Date: 10/2/2023   Encounter department: [address] [place] [address]     1. Chronic HFrEF (heart failure with reduced ejection fraction) ([facility])  Assessment & Plan:  Wt Readings from Last 3 Encounters:   10/02/23 (!) 137 kg (303 lb)   23 (!) 138 kg (305 lb)   23 (!) 138 kg (305 lb)   Ejection fraction is 40 and he is on both spironolactone and Lasix            2. Tobacco dependence  Assessment & Plan:  Currently half pack smoking daily we will start him on 14 mg patch and after a month he should follow-up again and will switch him to 7 mg patch    Orders:  -     nicotine (NICODERM CQ) 14 mg/24hr TD 24 hr patch; Place 1 patch on the skin over 24 hours every 24 hours    3. Essential hypertension  Assessment & Plan:   continue low-salt diet, continue same medications. And he will see the cardiologist in 2 weeks and advised to get refill of all his medication from them as they will monitor his medications  , I sent the refill for at least a month supply      Orders:  -     metoprolol succinate (TOPROL-XL) 50 mg 24 hr tablet; Take 1 tablet (50 mg total) by mouth 2 (two) times a day    4. Acute exacerbation of CHF (congestive heart failure) ([facility])  Assessment & Plan:  Last month he was admitted and diagnosed acute exacerbation of CHF, and now stable          Orders:  -     furosemide (LASIX) 40 mg tablet; Take 1 tablet (40 mg total) by mouth daily  -     losartan (COZAAR) 100 MG tablet; Take 1 tablet (100 mg total) by mouth daily    5. Primary hypertension  Assessment & Plan:   continue low-salt diet, continue same medications. And he will see the cardiologist in 2 weeks and advised to get refill of all his medication from them as they will monitor his medications  , I sent the refill for at least a month supply        6. Alcohol abuse  Assessment & Plan:   For last 1 month he says he has not been drinking alcohol             Subjective     Last month he was admitted in hospital and diagnosed acute congestive heart failure and has been now managed by cardiologist, he is on spironolactone and Lasix, he is taking metoprolol and losartan, he was not taking his medication which led to CHF,  He has not gained any recent weight, he has appointment with cardiologist in 2 weeks  He says he was given the NicoDerm patch in the hospital now he is smoking again about half pack per day and he is willing to quit smoking    Hypertension  This is a chronic problem. The current episode started more than 1 year ago. The problem has been gradually improving since onset. The problem is controlled. Pertinent negatives include no anxiety, blurred vision, chest pain, headaches, malaise/fatigue, neck pain, orthopnea, palpitations, peripheral edema, PND, shortness of breath or sweats. There are no associated agents to hypertension. Risk factors for coronary artery disease include obesity and smoking/tobacco exposure. The current treatment provides moderate improvement. Compliance problems include diet. Hypertensive end-organ damage includes kidney disease. Review of Systems   Constitutional: Negative. Negative for malaise/fatigue. HENT: Negative. Eyes: Negative. Negative for blurred vision. Respiratory: Negative for shortness of breath. Cardiovascular: Negative for chest pain, palpitations, orthopnea and PND. Gastrointestinal: Negative. Musculoskeletal: Negative for neck pain. Skin: Negative. Neurological: Negative for headaches. Psychiatric/Behavioral: Negative.         Past Medical History:   Diagnosis Date   • Alcohol abuse, daily use    • Chronic HFrEF (heart failure with reduced ejection fraction) ([facility]) 09/01/2023   • Chronic kidney disease (CKD)    • Hypertension    • Tobacco abuse      Past Surgical History:   Procedure Laterality Date   • BACK SURGERY     • FINGER SURGERY Right • KNEE SURGERY Right    • SHOULDER ARTHROPLASTY Right      Family History   Problem Relation Age of Onset   • Heart disease Mother    • Hypertension Mother    • Heart failure Mother    • Substance Abuse Mother    • No Known Problems Father    • No Known Problems Sister    • No Known Problems Sister    • Hypertension Brother    • No Known Problems Maternal Grandmother    • No Known Problems Maternal Grandfather    • No Known Problems Paternal Grandmother    • No Known Problems Paternal Grandfather    • No Known Problems Daughter    • Sudden death Neg Hx      Social History     Socioeconomic History   • Marital status: /Civil Union     Spouse name: [place] Friend   • Number of children: 1   • Years of education: None   • Highest education level: None   Occupational History   • None   Tobacco Use   • Smoking status: Every Day     Packs/day: 0.25     Types: Cigarettes   • Smokeless tobacco: Never   Vaping Use   • Vaping Use: Never used   Substance and Sexual Activity   • Alcohol use: Not Currently     Alcohol/week: 56.0 standard drinks of alcohol     Types: 56 Shots of liquor per week     Comment: Previously drinking pint of liquor (cognac) daily; abstaining since 09/2023 (Updated 09/08/2023). • Drug use: Yes     Types: Marijuana   • Sexual activity: None   Other Topics Concern   • None   Social History Narrative   • None     Social Determinants of Health     Financial Resource Strain: Not on file   Food Insecurity: Not on file   Transportation Needs: Not on file   Physical Activity: Not on file   Stress: Not on file   Social Connections: Not on file   Intimate Partner Violence: Not on file   Housing Stability: Not on file     Current Outpatient Medications on File Prior to Visit   Medication Sig   • [DISCONTINUED] furosemide (LASIX) 40 mg tablet Take 1 tablet (40 mg total) by mouth daily Do not start before September 5, 2023.    • [DISCONTINUED] losartan (COZAAR) 100 MG tablet Take 1 tablet (100 mg total) by mouth daily Do not start before September 5, 2023. • [DISCONTINUED] metoprolol succinate (TOPROL-XL) 50 mg 24 hr tablet Take 1 tablet (50 mg total) by mouth 2 (two) times a day   • [DISCONTINUED] spironolactone (ALDACTONE) 25 mg tablet Take 1 tablet (25 mg total) by mouth daily     No Known Allergies    There is no immunization history on file for this patient. Objective     /98   Pulse 92   Resp 16   Ht 6' 3" (1.905 m)   Wt (!) 137 kg (303 lb)   SpO2 98%   BMI 37.87 kg/m²     Physical Exam  Vitals and nursing note reviewed. Constitutional:       Appearance: Normal appearance. He is well-developed. He is not ill-appearing. Eyes:      Extraocular Movements: Extraocular movements intact. Neck:      Thyroid: No thyromegaly. Cardiovascular:      Rate and Rhythm: Normal rate and regular rhythm. Heart sounds: Normal heart sounds. No murmur heard. Pulmonary:      Effort: Pulmonary effort is normal.      Breath sounds: Normal breath sounds. No wheezing or rales. Musculoskeletal:      Cervical back: Normal range of motion and neck supple. Right lower leg: No edema. Left lower leg: No edema. Lymphadenopathy:      Cervical: No cervical adenopathy. Skin:     Findings: No erythema or rash. Neurological:      General: No focal deficit present. Mental Status: He is alert and oriented to person, place, and time.    Psychiatric:         Mood and Affect: Mood normal.       Saúl Aiken MD

## 2023-10-02 NOTE — ASSESSMENT & PLAN NOTE
Wt Readings from Last 3 Encounters:   10/02/23 (!) 137 kg (303 lb)   09/12/23 (!) 138 kg (305 lb)   09/08/23 (!) 138 kg (305 lb)   Ejection fraction is 40 and he is on both spironolactone and Lasix

## 2023-10-02 NOTE — ASSESSMENT & PLAN NOTE
Currently half pack smoking daily we will start him on 14 mg patch and after a month he should follow-up again and will switch him to 7 mg patch

## 2023-10-02 NOTE — ASSESSMENT & PLAN NOTE
continue low-salt diet, continue same medications.   And he will see the cardiologist in 2 weeks and advised to get refill of all his medication from them as they will monitor his medications  , I sent the refill for at least a month supply

## 2023-10-19 DIAGNOSIS — F17.200 TOBACCO DEPENDENCE: Chronic | ICD-10-CM

## 2023-10-24 RX ORDER — NICOTINE 21 MG/24HR
1 PATCH, TRANSDERMAL 24 HOURS TRANSDERMAL EVERY 24 HOURS
Qty: 28 PATCH | Refills: 0 | Status: SHIPPED | OUTPATIENT
Start: 2023-10-24

## 2023-10-25 DIAGNOSIS — I50.9 ACUTE EXACERBATION OF CHF (CONGESTIVE HEART FAILURE) (HCC): ICD-10-CM

## 2023-10-25 DIAGNOSIS — I10 ESSENTIAL HYPERTENSION: Chronic | ICD-10-CM

## 2023-10-25 RX ORDER — METOPROLOL SUCCINATE 50 MG/1
50 TABLET, EXTENDED RELEASE ORAL 2 TIMES DAILY
Qty: 180 TABLET | Refills: 1 | Status: SHIPPED | OUTPATIENT
Start: 2023-10-25

## 2023-10-25 RX ORDER — LOSARTAN POTASSIUM 100 MG/1
100 TABLET ORAL DAILY
Qty: 90 TABLET | Refills: 1 | Status: SHIPPED | OUTPATIENT
Start: 2023-10-25

## 2023-12-13 DIAGNOSIS — I10 PRIMARY HYPERTENSION: ICD-10-CM

## 2023-12-13 DIAGNOSIS — I50.22 CHRONIC HFREF (HEART FAILURE WITH REDUCED EJECTION FRACTION) (HCC): ICD-10-CM

## 2023-12-13 RX ORDER — SPIRONOLACTONE 25 MG/1
25 TABLET ORAL DAILY
Qty: 90 TABLET | Refills: 2 | Status: SHIPPED | OUTPATIENT
Start: 2023-12-13

## 2023-12-13 NOTE — TELEPHONE ENCOUNTER
Requested medication(s) are due for refill today: Yes  Patient has already received a courtesy refill: No    Other reason request has been forwarded to provider:

## 2024-04-09 ENCOUNTER — TELEPHONE (OUTPATIENT)
Age: 44
End: 2024-04-09

## 2024-04-09 DIAGNOSIS — E66.1 DRUG-INDUCED OBESITY, UNSPECIFIED CLASSIFICATION, UNSPECIFIED WHETHER SERIOUS COMORBIDITY PRESENT: Primary | ICD-10-CM

## 2024-04-09 DIAGNOSIS — R06.01 ORTHOPNEA: ICD-10-CM

## 2024-04-09 NOTE — TELEPHONE ENCOUNTER
Shari from [facility]. called to report that the patient has a referral made at the hospital for a sleep appointment with the doctor. the office wants a referral from the primary doctor Ambulatory referral for sleep center pcp to pcp code: E66.1 and R06.01

## 2024-04-26 DIAGNOSIS — I10 ESSENTIAL HYPERTENSION: Chronic | ICD-10-CM

## 2024-04-26 DIAGNOSIS — I50.9 ACUTE EXACERBATION OF CHF (CONGESTIVE HEART FAILURE) (HCC): ICD-10-CM

## 2024-04-26 RX ORDER — FUROSEMIDE 40 MG/1
40 TABLET ORAL DAILY
Qty: 90 TABLET | Refills: 1 | Status: SHIPPED | OUTPATIENT
Start: 2024-04-26

## 2024-04-26 RX ORDER — LOSARTAN POTASSIUM 100 MG/1
100 TABLET ORAL DAILY
Qty: 90 TABLET | Refills: 1 | Status: SHIPPED | OUTPATIENT
Start: 2024-04-26

## 2024-04-26 RX ORDER — METOPROLOL SUCCINATE 50 MG/1
50 TABLET, EXTENDED RELEASE ORAL 2 TIMES DAILY
Qty: 180 TABLET | Refills: 1 | Status: SHIPPED | OUTPATIENT
Start: 2024-04-26

## 2024-06-15 DIAGNOSIS — Z00.6 ENCOUNTER FOR EXAMINATION FOR NORMAL COMPARISON OR CONTROL IN CLINICAL RESEARCH PROGRAM: ICD-10-CM

## 2024-10-24 ENCOUNTER — VBI (OUTPATIENT)
Dept: ADMINISTRATIVE | Facility: OTHER | Age: 44
End: 2024-10-24

## 2024-10-24 NOTE — TELEPHONE ENCOUNTER
10/24/24 9:07 AM     Chart reviewed for BP ; nothing is submitted to the patient's insurance at this time.     Dhiraj Coombs MA   [facility]

## 2024-10-29 DIAGNOSIS — I10 ESSENTIAL HYPERTENSION: Chronic | ICD-10-CM

## 2024-10-29 DIAGNOSIS — F17.200 TOBACCO DEPENDENCE: Chronic | ICD-10-CM

## 2024-10-29 DIAGNOSIS — I50.9 ACUTE EXACERBATION OF CHF (CONGESTIVE HEART FAILURE) (HCC): ICD-10-CM

## 2024-10-29 RX ORDER — METOPROLOL SUCCINATE 50 MG/1
50 TABLET, EXTENDED RELEASE ORAL 2 TIMES DAILY
Qty: 180 TABLET | Refills: 0 | Status: CANCELLED | OUTPATIENT
Start: 2024-10-29

## 2024-10-29 RX ORDER — FUROSEMIDE 40 MG/1
40 TABLET ORAL DAILY
Qty: 90 TABLET | Refills: 0 | Status: CANCELLED | OUTPATIENT
Start: 2024-10-29

## 2024-10-29 RX ORDER — LOSARTAN POTASSIUM 100 MG/1
100 TABLET ORAL DAILY
Qty: 90 TABLET | Refills: 0 | Status: CANCELLED | OUTPATIENT
Start: 2024-10-29

## 2024-10-29 RX ORDER — NICOTINE 21 MG/24HR
1 PATCH, TRANSDERMAL 24 HOURS TRANSDERMAL EVERY 24 HOURS
Qty: 28 PATCH | Refills: 0 | Status: CANCELLED | OUTPATIENT
Start: 2024-10-29

## 2024-11-13 ENCOUNTER — HOSPITAL ENCOUNTER (EMERGENCY)
Facility: HOSPITAL | Age: 44
Discharge: HOME | End: 2024-11-13
Attending: EMERGENCY MEDICINE | Admitting: EMERGENCY MEDICINE
Payer: COMMERCIAL

## 2024-11-13 VITALS
SYSTOLIC BLOOD PRESSURE: 150 MMHG | RESPIRATION RATE: 16 BRPM | HEART RATE: 96 BPM | BODY MASS INDEX: 37.92 KG/M2 | TEMPERATURE: 97.3 F | HEIGHT: 75 IN | OXYGEN SATURATION: 97 % | DIASTOLIC BLOOD PRESSURE: 94 MMHG | WEIGHT: 305 LBS

## 2024-11-13 DIAGNOSIS — L02.91 ABSCESS: Primary | ICD-10-CM

## 2024-11-13 PROCEDURE — 96372 THER/PROPH/DIAG INJ SC/IM: CPT

## 2024-11-13 PROCEDURE — 99282 EMERGENCY DEPT VISIT SF MDM: CPT

## 2024-11-13 PROCEDURE — 99284 EMERGENCY DEPT VISIT MOD MDM: CPT | Performed by: EMERGENCY MEDICINE

## 2024-11-13 PROCEDURE — 10060 I&D ABSCESS SIMPLE/SINGLE: CPT | Performed by: EMERGENCY MEDICINE

## 2024-11-13 RX ORDER — CEPHALEXIN 500 MG/1
500 CAPSULE ORAL EVERY 6 HOURS SCHEDULED
Qty: 28 CAPSULE | Refills: 0 | Status: SHIPPED | OUTPATIENT
Start: 2024-11-13 | End: 2024-11-15

## 2024-11-13 RX ORDER — FENTANYL CITRATE 50 UG/ML
50 INJECTION, SOLUTION INTRAMUSCULAR; INTRAVENOUS ONCE
Refills: 0 | Status: DISCONTINUED | OUTPATIENT
Start: 2024-11-13 | End: 2024-11-13

## 2024-11-13 RX ORDER — FENTANYL CITRATE 50 UG/ML
50 INJECTION, SOLUTION INTRAMUSCULAR; INTRAVENOUS ONCE
Refills: 0 | Status: COMPLETED | OUTPATIENT
Start: 2024-11-13 | End: 2024-11-13

## 2024-11-13 RX ADMIN — CEPHALEXIN 500 MG: 250 CAPSULE ORAL at 10:01

## 2024-11-13 RX ADMIN — FENTANYL CITRATE 50 MCG: 50 INJECTION INTRAMUSCULAR; INTRAVENOUS at 10:02

## 2024-11-13 RX ADMIN — Medication 1 APPLICATION: at 08:40

## 2024-11-13 NOTE — ED PROVIDER NOTES
Time reflects when diagnosis was documented in both MDM as applicable and the Disposition within this note       Time User Action Codes Description Comment    11/13/2024 10:03 AM Yassine Fonseca Add [L02.91] Abscess           ED Disposition       ED Disposition   Discharge    Condition   Stable    Date/Time   Wed Nov 13, 2024 10:11 AM    Comment   Asif Kruse discharge to home/self care.                   Assessment & Plan       Medical Decision Making  44-year-old male presenting to the ED with a gluteal abscess.    Ultrasound was completed to confirm the abscess which did show a superficial soft tissue collection of fluid with no deep track.    Incision and drainage completed per I&D note.    Patient was started on Keflex for prophylactic coverage and given fentanyl as well as let gel here in the ED for pain management.    Following I&D, gauze was placed in the areas packing and patient discharged with ambulatory referral to general surgery.    Patient given strict return precautions and instructions on best ways to keep the area clean.    Patient discharged.      Risk  Prescription drug management.             Medications   LET gel 1 Application (1 Application Topical Given 11/13/24 0840)   cephalexin (KEFLEX) capsule 500 mg (500 mg Oral Given 11/13/24 1001)   fentaNYL injection 50 mcg (50 mcg Intramuscular Given 11/13/24 1002)       ED Risk Strat Scores                                               History of Present Illness       Chief Complaint   Patient presents with    Abscess     Pt states started with abscess to buttock 5 days ago has had them in the past       Past Medical History:   Diagnosis Date    Alcohol abuse, daily use     Chronic HFrEF (heart failure with reduced ejection fraction) ([facility]) 09/01/2023    Chronic kidney disease (CKD)     Hypertension     Tobacco abuse       Past Surgical History:   Procedure Laterality Date    BACK SURGERY      FINGER SURGERY Right     KNEE SURGERY Right      SHOULDER ARTHROPLASTY Right       Family History   Problem Relation Age of Onset    Heart disease Mother     Hypertension Mother     Heart failure Mother     Substance Abuse Mother     No Known Problems Father     No Known Problems Sister     No Known Problems Sister     Hypertension Brother     No Known Problems Maternal Grandmother     No Known Problems Maternal Grandfather     No Known Problems Paternal Grandmother     No Known Problems Paternal Grandfather     No Known Problems Daughter     Sudden death Neg Hx       Social History     Tobacco Use    Smoking status: Every Day     Current packs/day: 0.25     Types: Cigarettes    Smokeless tobacco: Never   Vaping Use    Vaping status: Never Used   Substance Use Topics    Alcohol use: Not Currently     Alcohol/week: 56.0 standard drinks of alcohol     Types: 56 Shots of liquor per week     Comment: Previously drinking pint of liquor (cognac) daily; abstaining since 09/2023 (Updated 09/08/2023).    Drug use: Yes     Types: Marijuana      E-Cigarette/Vaping    E-Cigarette Use Never User       E-Cigarette/Vaping Substances      I have reviewed and agree with the history as documented.     HPI    Review of Systems        Objective       ED Triage Vitals [11/13/24 0822]   Temperature Pulse Blood Pressure Respirations SpO2 Patient Position - Orthostatic VS   (!) 97.3 °F (36.3 °C) 96 150/94 16 97 % --      Temp Source Heart Rate Source BP Location FiO2 (%) Pain Score    Oral -- -- -- 10 - Worst Possible Pain      Vitals      Date and Time Temp Pulse SpO2 Resp BP Pain Score FACES Pain Rating User   11/13/24 0822 97.3 °F (36.3 °C) 96 97 % 16 150/94 10 - Worst Possible Pain -- JDT            Physical Exam    Results Reviewed       None            No orders to display       POC MSK/Soft Tissue US    Date/Time: 11/13/2024 10:11 AM    Performed by: Yassine Ryder MD  Authorized by: Yassine Ryder MD    Patient location:  ED  Performed by:  Attending  Procedure:      Performed: soft tissue ultrasound    Procedure details:     Exam Type:  Diagnostic    Longitudinal view:  Obtained    Transverse view:  Obtained    Image quality: diagnostic    Soft tissue ultrasound:     Soft tissue indications: swelling, pain and suspected abscess      Anatomic location:  Buttock    Soft tissue findings: cobblestoning and subcutaneous collection (comment)    Interpretation:     Soft tissue impressions: consistent with abscess    Comments:      Abscess noted superficially with no track noted deep and no fistula noted  Incision and drain    Date/Time: 11/13/2024 10:00 AM    Performed by: Yassine Ryder MD  Authorized by: Yassine Ryder MD  Universal Protocol:  Procedure performed by:  Consent: Verbal consent obtained.  Consent given by: patient  Patient identity confirmed: verbally with patient and arm band    Patient location:  ED  Location:     Type:  Abscess    Location:  Anogenital    Anogenital location:  Gluteal cleft  Pre-procedure details:     Skin preparation:  Antiseptic wash  Anesthesia (see MAR for exact dosages):     Anesthesia method:  Topical application    Topical anesthetic:  LET  Procedure details:     Complexity:  Simple    Needle aspiration: no      Incision types:  Stab incision    Scalpel blade:  11    Approach:  Open    Incision depth:  Subcutaneous    Drainage:  Purulent and bloody    Drainage amount:  Scant    Wound treatment:  Wound left open    Packing materials:  None  Post-procedure details:     Patient tolerance of procedure:  Tolerated well, no immediate complications      ED Medication and Procedure Management   Prior to Admission Medications   Prescriptions Last Dose Informant Patient Reported? Taking?   furosemide (LASIX) 40 mg tablet   No No   Sig: TAKE 1 TABLET BY MOUTH EVERY DAY   losartan (COZAAR) 100 MG tablet   No No   Sig: TAKE 1 TABLET BY MOUTH EVERY DAY   metoprolol succinate (TOPROL-XL) 50 mg 24 hr tablet   No No   Sig: TAKE 1 TABLET BY MOUTH TWICE  A DAY   nicotine (NICODERM CQ) 14 mg/24hr TD 24 hr patch   No No   Sig: Place 1 patch on the skin over 24 hours every 24 hours   spironolactone (ALDACTONE) 25 mg tablet   No No   Sig: TAKE 1 TABLET (25 MG TOTAL) BY MOUTH DAILY.      Facility-Administered Medications: None     Discharge Medication List as of 11/13/2024 10:13 AM        START taking these medications    Details   cephalexin (KEFLEX) 500 mg capsule Take 1 capsule (500 mg total) by mouth every 6 (six) hours for 7 days, Starting Wed 11/13/2024, Until Wed 11/20/2024, Normal           CONTINUE these medications which have NOT CHANGED    Details   furosemide (LASIX) 40 mg tablet TAKE 1 TABLET BY MOUTH EVERY DAY, Starting Fri 4/26/2024, Normal      losartan (COZAAR) 100 MG tablet TAKE 1 TABLET BY MOUTH EVERY DAY, Starting Fri 4/26/2024, Normal      metoprolol succinate (TOPROL-XL) 50 mg 24 hr tablet TAKE 1 TABLET BY MOUTH TWICE A DAY, Starting Fri 4/26/2024, Normal      nicotine (NICODERM CQ) 14 mg/24hr TD 24 hr patch Place 1 patch on the skin over 24 hours every 24 hours, Starting Tue 10/24/2023, Normal      spironolactone (ALDACTONE) 25 mg tablet TAKE 1 TABLET (25 MG TOTAL) BY MOUTH DAILY., Starting Wed 12/13/2023, Normal             ED SEPSIS DOCUMENTATION   Time reflects when diagnosis was documented in both MDM as applicable and the Disposition within this note       Time User Action Codes Description Comment    11/13/2024 10:03 AM Yassine Ryder Add [L02.91] Abscess                  Yassine Ryder MD  11/13/24 1022

## 2024-11-13 NOTE — DISCHARGE INSTRUCTIONS
Follow-up with general surgery to reassess your recurrent abscesses.  He will be given 7-day course of Keflex prescription to continue on.  Please continue these antibiotics and keep your rectal area clean.  2 incisions were made with minimal drainage from each incision.  These incisions are left open to keep from suturing and further infection.  When going to the bathroom and having a bowel movement, use wet wipes for the shower to clean the area as toilet paper may irritate the area.

## 2024-11-13 NOTE — ED ATTENDING ATTESTATION
11/13/2024  IJos DO, saw and evaluated the patient. I have discussed the patient with the resident/non-physician practitioner and agree with the resident's/non-physician practitioner's findings, Plan of Care, and MDM as documented in the resident's/non-physician practitioner's note, except where noted. All available labs and Radiology studies were reviewed.  I was present for key portions of any procedure(s) performed by the resident/non-physician practitioner and I was immediately available to provide assistance.       At this point I agree with the current assessment done in the Emergency Department.  I have conducted an independent evaluation of this patient a history and physical is as follows:    Vital sign stable.  Patient resting comfortably.  Medial aspect of the left buttocks approximately 4 cm superior to the anus with focal one by one area of induration with the central aspect having previous scarring consistent with previous incision and drainage.  No extension approximately nor inferiorly towards anus.    MDM: 44-year-old male presenting to the emergency department with small focal abscess to the left medial aspect of the buttocks.  No anal involvement present.  Bedside ultrasound clearly defined approximately 1 x 1 cm area without further extension or deeper tracking.  On ultrasound, fluid-filled in nature.  Status post incision and drainage, some purulence was expressed.  Given patient's past medical history, antibiotics provided here as well as a course of the same.  Patient provided ambulatory referral to surgery given recurrence of the abscess in the same location of the buttocks as a pertains to the patient's description as well as the previous scars secondary to surgical incisions. Reviewed all findings both relevant and incidental with the patient at bedside. Pt verbalized understanding of findings, neccesary follow up, return to ED precautions. Pt agreed to review today's findings  with their primary care provider. Pt non-toxic appearing upon discharge.       ED Course  ED Course as of 11/13/24 1623   Wed Nov 13, 2024   0930 44-year-old male, past medical history recurrent abscesses, presenting to the emergency department with an abscess to his buttocks that has been developing over the last few days.  Patient [place] fever, chills, or any other systemic signs or symptoms.             Critical Care Time  Procedures

## 2024-11-15 ENCOUNTER — APPOINTMENT (EMERGENCY)
Dept: CT IMAGING | Facility: HOSPITAL | Age: 44
End: 2024-11-15
Payer: COMMERCIAL

## 2024-11-15 ENCOUNTER — HOSPITAL ENCOUNTER (EMERGENCY)
Facility: HOSPITAL | Age: 44
Discharge: HOME | End: 2024-11-15
Attending: EMERGENCY MEDICINE
Payer: COMMERCIAL

## 2024-11-15 ENCOUNTER — ANESTHESIA (EMERGENCY)
Dept: PERIOP | Facility: HOSPITAL | Age: 44
End: 2024-11-15
Payer: COMMERCIAL

## 2024-11-15 ENCOUNTER — ANESTHESIA EVENT (EMERGENCY)
Dept: PERIOP | Facility: HOSPITAL | Age: 44
End: 2024-11-15
Payer: COMMERCIAL

## 2024-11-15 VITALS
OXYGEN SATURATION: 98 % | DIASTOLIC BLOOD PRESSURE: 106 MMHG | RESPIRATION RATE: 20 BRPM | TEMPERATURE: 98 F | SYSTOLIC BLOOD PRESSURE: 212 MMHG | HEART RATE: 80 BPM

## 2024-11-15 DIAGNOSIS — K61.1 PERIRECTAL ABSCESS: ICD-10-CM

## 2024-11-15 DIAGNOSIS — K61.0 PERIANAL ABSCESS: Primary | ICD-10-CM

## 2024-11-15 LAB
ALBUMIN SERPL BCG-MCNC: 4.3 G/DL (ref 3.5–5)
ALP SERPL-CCNC: 68 U/L (ref 34–104)
ALT SERPL W P-5'-P-CCNC: 29 U/L (ref 7–52)
ANION GAP SERPL CALCULATED.3IONS-SCNC: 6 MMOL/L (ref 4–13)
AST SERPL W P-5'-P-CCNC: 28 U/L (ref 13–39)
BASOPHILS # BLD AUTO: 0.07 THOUSANDS/ÂΜL (ref 0–0.1)
BASOPHILS NFR BLD AUTO: 1 % (ref 0–1)
BILIRUB SERPL-MCNC: 0.69 MG/DL (ref 0.2–1)
BUN SERPL-MCNC: 15 MG/DL (ref 5–25)
CALCIUM SERPL-MCNC: 9.6 MG/DL (ref 8.4–10.2)
CHLORIDE SERPL-SCNC: 106 MMOL/L (ref 96–108)
CO2 SERPL-SCNC: 26 MMOL/L (ref 21–32)
CREAT SERPL-MCNC: 1.52 MG/DL (ref 0.6–1.3)
EOSINOPHIL # BLD AUTO: 0.03 THOUSAND/ÂΜL (ref 0–0.61)
EOSINOPHIL NFR BLD AUTO: 0 % (ref 0–6)
ERYTHROCYTE [DISTWIDTH] IN BLOOD BY AUTOMATED COUNT: 11.3 % (ref 11.6–15.1)
GFR SERPL CREATININE-BSD FRML MDRD: 54 ML/MIN/1.73SQ M
GLUCOSE SERPL-MCNC: 109 MG/DL (ref 65–140)
HCT VFR BLD AUTO: 40.7 % (ref 36.5–49.3)
HGB BLD-MCNC: 13.3 G/DL (ref 12–17)
IMM GRANULOCYTES # BLD AUTO: 0.03 THOUSAND/UL (ref 0–0.2)
IMM GRANULOCYTES NFR BLD AUTO: 0 % (ref 0–2)
LYMPHOCYTES # BLD AUTO: 1.22 THOUSANDS/ÂΜL (ref 0.6–4.47)
LYMPHOCYTES NFR BLD AUTO: 13 % (ref 14–44)
MCH RBC QN AUTO: 32.4 PG (ref 26.8–34.3)
MCHC RBC AUTO-ENTMCNC: 32.7 G/DL (ref 31.4–37.4)
MCV RBC AUTO: 99 FL (ref 82–98)
MONOCYTES # BLD AUTO: 0.49 THOUSAND/ÂΜL (ref 0.17–1.22)
MONOCYTES NFR BLD AUTO: 5 % (ref 4–12)
NEUTROPHILS # BLD AUTO: 7.8 THOUSANDS/ÂΜL (ref 1.85–7.62)
NEUTS SEG NFR BLD AUTO: 81 % (ref 43–75)
NRBC BLD AUTO-RTO: 0 /100 WBCS
PLATELET # BLD AUTO: 211 THOUSANDS/UL (ref 149–390)
PMV BLD AUTO: 10.5 FL (ref 8.9–12.7)
POTASSIUM SERPL-SCNC: 4 MMOL/L (ref 3.5–5.3)
PROT SERPL-MCNC: 7.8 G/DL (ref 6.4–8.4)
RBC # BLD AUTO: 4.1 MILLION/UL (ref 3.88–5.62)
SODIUM SERPL-SCNC: 138 MMOL/L (ref 135–147)
WBC # BLD AUTO: 9.64 THOUSAND/UL (ref 4.31–10.16)

## 2024-11-15 PROCEDURE — 85025 COMPLETE CBC W/AUTO DIFF WBC: CPT | Performed by: EMERGENCY MEDICINE

## 2024-11-15 PROCEDURE — 87186 SC STD MICRODIL/AGAR DIL: CPT | Performed by: COLON & RECTAL SURGERY

## 2024-11-15 PROCEDURE — 36415 COLL VENOUS BLD VENIPUNCTURE: CPT | Performed by: EMERGENCY MEDICINE

## 2024-11-15 PROCEDURE — 88313 SPECIAL STAINS GROUP 2: CPT | Performed by: PATHOLOGY

## 2024-11-15 PROCEDURE — 96375 TX/PRO/DX INJ NEW DRUG ADDON: CPT

## 2024-11-15 PROCEDURE — 87116 MYCOBACTERIA CULTURE: CPT | Performed by: COLON & RECTAL SURGERY

## 2024-11-15 PROCEDURE — 87070 CULTURE OTHR SPECIMN AEROBIC: CPT | Performed by: COLON & RECTAL SURGERY

## 2024-11-15 PROCEDURE — 96361 HYDRATE IV INFUSION ADD-ON: CPT

## 2024-11-15 PROCEDURE — 99243 OFF/OP CNSLTJ NEW/EST LOW 30: CPT | Performed by: COLON & RECTAL SURGERY

## 2024-11-15 PROCEDURE — 72193 CT PELVIS W/DYE: CPT

## 2024-11-15 PROCEDURE — 87075 CULTR BACTERIA EXCEPT BLOOD: CPT | Performed by: COLON & RECTAL SURGERY

## 2024-11-15 PROCEDURE — 88304 TISSUE EXAM BY PATHOLOGIST: CPT | Performed by: PATHOLOGY

## 2024-11-15 PROCEDURE — 96365 THER/PROPH/DIAG IV INF INIT: CPT

## 2024-11-15 PROCEDURE — 99291 CRITICAL CARE FIRST HOUR: CPT | Performed by: EMERGENCY MEDICINE

## 2024-11-15 PROCEDURE — 46040 I&D ISCHIORCT&/PERIRCT ABSC: CPT | Performed by: COLON & RECTAL SURGERY

## 2024-11-15 PROCEDURE — 88341 IMHCHEM/IMCYTCHM EA ADD ANTB: CPT | Performed by: PATHOLOGY

## 2024-11-15 PROCEDURE — NC001 PR NO CHARGE: Performed by: PHYSICIAN ASSISTANT

## 2024-11-15 PROCEDURE — 87077 CULTURE AEROBIC IDENTIFY: CPT | Performed by: COLON & RECTAL SURGERY

## 2024-11-15 PROCEDURE — 87185 SC STD ENZYME DETCJ PER NZM: CPT | Performed by: COLON & RECTAL SURGERY

## 2024-11-15 PROCEDURE — 99283 EMERGENCY DEPT VISIT LOW MDM: CPT

## 2024-11-15 PROCEDURE — 80053 COMPREHEN METABOLIC PANEL: CPT | Performed by: EMERGENCY MEDICINE

## 2024-11-15 PROCEDURE — 87205 SMEAR GRAM STAIN: CPT | Performed by: COLON & RECTAL SURGERY

## 2024-11-15 PROCEDURE — 87102 FUNGUS ISOLATION CULTURE: CPT | Performed by: COLON & RECTAL SURGERY

## 2024-11-15 PROCEDURE — 96376 TX/PRO/DX INJ SAME DRUG ADON: CPT

## 2024-11-15 PROCEDURE — 96374 THER/PROPH/DIAG INJ IV PUSH: CPT

## 2024-11-15 PROCEDURE — 99156 MOD SED OTH PHYS/QHP 5/>YRS: CPT | Performed by: EMERGENCY MEDICINE

## 2024-11-15 PROCEDURE — 88342 IMHCHEM/IMCYTCHM 1ST ANTB: CPT | Performed by: PATHOLOGY

## 2024-11-15 PROCEDURE — 87206 SMEAR FLUORESCENT/ACID STAI: CPT | Performed by: COLON & RECTAL SURGERY

## 2024-11-15 RX ORDER — HYDROMORPHONE HCL/PF 1 MG/ML
0.5 SYRINGE (ML) INJECTION
Refills: 0 | Status: DISCONTINUED | OUTPATIENT
Start: 2024-11-15 | End: 2024-11-15 | Stop reason: HOSPADM

## 2024-11-15 RX ORDER — MIDAZOLAM HYDROCHLORIDE 2 MG/2ML
INJECTION, SOLUTION INTRAMUSCULAR; INTRAVENOUS AS NEEDED
Status: DISCONTINUED | OUTPATIENT
Start: 2024-11-15 | End: 2024-11-15

## 2024-11-15 RX ORDER — ROCURONIUM BROMIDE 10 MG/ML
INJECTION, SOLUTION INTRAVENOUS AS NEEDED
Status: DISCONTINUED | OUTPATIENT
Start: 2024-11-15 | End: 2024-11-15

## 2024-11-15 RX ORDER — HYDROMORPHONE HCL/PF 1 MG/ML
0.5 SYRINGE (ML) INJECTION ONCE
Status: COMPLETED | OUTPATIENT
Start: 2024-11-15 | End: 2024-11-15

## 2024-11-15 RX ORDER — CEFAZOLIN SODIUM 2 G/50ML
2000 SOLUTION INTRAVENOUS
Status: DISCONTINUED | OUTPATIENT
Start: 2024-11-15 | End: 2024-11-15

## 2024-11-15 RX ORDER — METHOCARBAMOL 500 MG/1
500 TABLET, FILM COATED ORAL EVERY 6 HOURS SCHEDULED
Status: DISCONTINUED | OUTPATIENT
Start: 2024-11-15 | End: 2024-11-15 | Stop reason: HOSPADM

## 2024-11-15 RX ORDER — ONDANSETRON 2 MG/ML
4 INJECTION INTRAMUSCULAR; INTRAVENOUS ONCE AS NEEDED
Status: DISCONTINUED | OUTPATIENT
Start: 2024-11-15 | End: 2024-11-15 | Stop reason: HOSPADM

## 2024-11-15 RX ORDER — METOPROLOL SUCCINATE 50 MG/1
50 TABLET, EXTENDED RELEASE ORAL 2 TIMES DAILY
Status: DISCONTINUED | OUTPATIENT
Start: 2024-11-15 | End: 2024-11-15 | Stop reason: HOSPADM

## 2024-11-15 RX ORDER — NICOTINE 21 MG/24HR
1 PATCH, TRANSDERMAL 24 HOURS TRANSDERMAL EVERY 24 HOURS
Status: DISCONTINUED | OUTPATIENT
Start: 2024-11-15 | End: 2024-11-15 | Stop reason: HOSPADM

## 2024-11-15 RX ORDER — OXYCODONE HYDROCHLORIDE 10 MG/1
10 TABLET ORAL EVERY 4 HOURS PRN
Refills: 0 | Status: DISCONTINUED | OUTPATIENT
Start: 2024-11-15 | End: 2024-11-15 | Stop reason: HOSPADM

## 2024-11-15 RX ORDER — LOSARTAN POTASSIUM 50 MG/1
100 TABLET ORAL ONCE
Status: COMPLETED | OUTPATIENT
Start: 2024-11-15 | End: 2024-11-15

## 2024-11-15 RX ORDER — ONDANSETRON 2 MG/ML
4 INJECTION INTRAMUSCULAR; INTRAVENOUS EVERY 4 HOURS PRN
Status: DISCONTINUED | OUTPATIENT
Start: 2024-11-15 | End: 2024-11-15 | Stop reason: HOSPADM

## 2024-11-15 RX ORDER — FENTANYL CITRATE 50 UG/ML
INJECTION, SOLUTION INTRAMUSCULAR; INTRAVENOUS AS NEEDED
Status: DISCONTINUED | OUTPATIENT
Start: 2024-11-15 | End: 2024-11-15

## 2024-11-15 RX ORDER — PROMETHAZINE HYDROCHLORIDE 25 MG/ML
25 INJECTION, SOLUTION INTRAMUSCULAR; INTRAVENOUS ONCE AS NEEDED
Status: DISCONTINUED | OUTPATIENT
Start: 2024-11-15 | End: 2024-11-15 | Stop reason: HOSPADM

## 2024-11-15 RX ORDER — PROPOFOL 10 MG/ML
INJECTION, EMULSION INTRAVENOUS AS NEEDED
Status: DISCONTINUED | OUTPATIENT
Start: 2024-11-15 | End: 2024-11-15

## 2024-11-15 RX ORDER — PROPOFOL 10 MG/ML
150 INJECTION, EMULSION INTRAVENOUS ONCE
Status: COMPLETED | OUTPATIENT
Start: 2024-11-15 | End: 2024-11-15

## 2024-11-15 RX ORDER — HEPARIN SODIUM 5000 [USP'U]/ML
5000 INJECTION, SOLUTION INTRAVENOUS; SUBCUTANEOUS EVERY 8 HOURS SCHEDULED
Status: DISCONTINUED | OUTPATIENT
Start: 2024-11-15 | End: 2024-11-15 | Stop reason: HOSPADM

## 2024-11-15 RX ORDER — BUPIVACAINE HYDROCHLORIDE AND EPINEPHRINE 2.5; 5 MG/ML; UG/ML
INJECTION, SOLUTION EPIDURAL; INFILTRATION; INTRACAUDAL; PERINEURAL AS NEEDED
Status: DISCONTINUED | OUTPATIENT
Start: 2024-11-15 | End: 2024-11-15 | Stop reason: HOSPADM

## 2024-11-15 RX ORDER — OXYCODONE AND ACETAMINOPHEN 5; 325 MG/1; MG/1
1 TABLET ORAL EVERY 4 HOURS PRN
Status: DISCONTINUED | OUTPATIENT
Start: 2024-11-15 | End: 2024-11-15 | Stop reason: SDUPTHER

## 2024-11-15 RX ORDER — HYDROMORPHONE HCL/PF 1 MG/ML
1 SYRINGE (ML) INJECTION ONCE
Refills: 0 | Status: DISCONTINUED | OUTPATIENT
Start: 2024-11-15 | End: 2024-11-15 | Stop reason: HOSPADM

## 2024-11-15 RX ORDER — LABETALOL HYDROCHLORIDE 5 MG/ML
INJECTION, SOLUTION INTRAVENOUS AS NEEDED
Status: DISCONTINUED | OUTPATIENT
Start: 2024-11-15 | End: 2024-11-15

## 2024-11-15 RX ORDER — DEXAMETHASONE SODIUM PHOSPHATE 10 MG/ML
INJECTION, SOLUTION INTRAMUSCULAR; INTRAVENOUS AS NEEDED
Status: DISCONTINUED | OUTPATIENT
Start: 2024-11-15 | End: 2024-11-15

## 2024-11-15 RX ORDER — METRONIDAZOLE 500 MG/100ML
500 INJECTION, SOLUTION INTRAVENOUS EVERY 8 HOURS
Status: DISCONTINUED | OUTPATIENT
Start: 2024-11-15 | End: 2024-11-15

## 2024-11-15 RX ORDER — DIPHENHYDRAMINE HYDROCHLORIDE 50 MG/ML
12.5 INJECTION INTRAMUSCULAR; INTRAVENOUS ONCE AS NEEDED
Status: DISCONTINUED | OUTPATIENT
Start: 2024-11-15 | End: 2024-11-15 | Stop reason: HOSPADM

## 2024-11-15 RX ORDER — OXYCODONE HYDROCHLORIDE 5 MG/1
5 TABLET ORAL EVERY 4 HOURS PRN
Refills: 0 | Status: DISCONTINUED | OUTPATIENT
Start: 2024-11-15 | End: 2024-11-15 | Stop reason: HOSPADM

## 2024-11-15 RX ORDER — OXYCODONE AND ACETAMINOPHEN 5; 325 MG/1; MG/1
1 TABLET ORAL EVERY 6 HOURS PRN
Qty: 10 TABLET | Refills: 0 | Status: SHIPPED | OUTPATIENT
Start: 2024-11-15 | End: 2024-11-15

## 2024-11-15 RX ORDER — GABAPENTIN 100 MG/1
100 CAPSULE ORAL 3 TIMES DAILY
Status: DISCONTINUED | OUTPATIENT
Start: 2024-11-15 | End: 2024-11-15 | Stop reason: HOSPADM

## 2024-11-15 RX ORDER — HYDROMORPHONE HCL/PF 1 MG/ML
1 SYRINGE (ML) INJECTION ONCE
Status: COMPLETED | OUTPATIENT
Start: 2024-11-15 | End: 2024-11-15

## 2024-11-15 RX ORDER — LIDOCAINE HYDROCHLORIDE 10 MG/ML
INJECTION, SOLUTION EPIDURAL; INFILTRATION; INTRACAUDAL; PERINEURAL AS NEEDED
Status: DISCONTINUED | OUTPATIENT
Start: 2024-11-15 | End: 2024-11-15

## 2024-11-15 RX ORDER — LIDOCAINE HYDROCHLORIDE 20 MG/ML
10 INJECTION, SOLUTION EPIDURAL; INFILTRATION; INTRACAUDAL; PERINEURAL ONCE
Status: COMPLETED | OUTPATIENT
Start: 2024-11-15 | End: 2024-11-15

## 2024-11-15 RX ORDER — FENTANYL CITRATE/PF 50 MCG/ML
50 SYRINGE (ML) INJECTION
Status: DISCONTINUED | OUTPATIENT
Start: 2024-11-15 | End: 2024-11-15 | Stop reason: HOSPADM

## 2024-11-15 RX ORDER — ONDANSETRON 2 MG/ML
INJECTION INTRAMUSCULAR; INTRAVENOUS AS NEEDED
Status: DISCONTINUED | OUTPATIENT
Start: 2024-11-15 | End: 2024-11-15

## 2024-11-15 RX ORDER — OXYCODONE AND ACETAMINOPHEN 5; 325 MG/1; MG/1
1 TABLET ORAL EVERY 4 HOURS PRN
Qty: 12 TABLET | Refills: 0 | Status: SHIPPED | OUTPATIENT
Start: 2024-11-15 | End: 2024-11-20

## 2024-11-15 RX ORDER — SODIUM CHLORIDE, SODIUM LACTATE, POTASSIUM CHLORIDE, CALCIUM CHLORIDE 600; 310; 30; 20 MG/100ML; MG/100ML; MG/100ML; MG/100ML
125 INJECTION, SOLUTION INTRAVENOUS CONTINUOUS
Status: DISCONTINUED | OUTPATIENT
Start: 2024-11-15 | End: 2024-11-15 | Stop reason: HOSPADM

## 2024-11-15 RX ORDER — ACETAMINOPHEN 325 MG/1
650 TABLET ORAL EVERY 6 HOURS SCHEDULED
Status: DISCONTINUED | OUTPATIENT
Start: 2024-11-15 | End: 2024-11-15 | Stop reason: HOSPADM

## 2024-11-15 RX ORDER — HYDROMORPHONE HCL/PF 1 MG/ML
1 SYRINGE (ML) INJECTION ONCE
Refills: 0 | Status: COMPLETED | OUTPATIENT
Start: 2024-11-15 | End: 2024-11-15

## 2024-11-15 RX ORDER — SODIUM CHLORIDE, SODIUM LACTATE, POTASSIUM CHLORIDE, CALCIUM CHLORIDE 600; 310; 30; 20 MG/100ML; MG/100ML; MG/100ML; MG/100ML
100 INJECTION, SOLUTION INTRAVENOUS CONTINUOUS
Status: CANCELLED | OUTPATIENT
Start: 2024-11-15

## 2024-11-15 RX ORDER — HYDROMORPHONE HCL/PF 1 MG/ML
1 SYRINGE (ML) INJECTION
Status: DISCONTINUED | OUTPATIENT
Start: 2024-11-15 | End: 2024-11-15 | Stop reason: HOSPADM

## 2024-11-15 RX ADMIN — IOHEXOL 80 ML: 350 INJECTION, SOLUTION INTRAVENOUS at 09:52

## 2024-11-15 RX ADMIN — CEFTRIAXONE 1000 MG: 2 INJECTION, POWDER, FOR SOLUTION INTRAMUSCULAR; INTRAVENOUS at 14:31

## 2024-11-15 RX ADMIN — LIDOCAINE HYDROCHLORIDE 50 MG: 10 INJECTION, SOLUTION EPIDURAL; INFILTRATION; INTRACAUDAL at 18:54

## 2024-11-15 RX ADMIN — ROCURONIUM 50 MG: 50 INJECTION, SOLUTION INTRAVENOUS at 18:54

## 2024-11-15 RX ADMIN — FENTANYL CITRATE 50 MCG: 50 INJECTION INTRAMUSCULAR; INTRAVENOUS at 20:05

## 2024-11-15 RX ADMIN — DEXAMETHASONE SODIUM PHOSPHATE 10 MG: 10 INJECTION, SOLUTION INTRAMUSCULAR; INTRAVENOUS at 18:59

## 2024-11-15 RX ADMIN — LABETALOL HYDROCHLORIDE 10 MG: 5 INJECTION, SOLUTION INTRAVENOUS at 19:12

## 2024-11-15 RX ADMIN — FENTANYL CITRATE 50 MCG: 50 INJECTION INTRAMUSCULAR; INTRAVENOUS at 18:51

## 2024-11-15 RX ADMIN — METRONIDAZOLE 500 MG: 500 INJECTION, SOLUTION INTRAVENOUS at 15:20

## 2024-11-15 RX ADMIN — SODIUM CHLORIDE 1000 ML: 0.9 INJECTION, SOLUTION INTRAVENOUS at 13:26

## 2024-11-15 RX ADMIN — HYDROMORPHONE HYDROCHLORIDE 1 MG: 1 INJECTION, SOLUTION INTRAMUSCULAR; INTRAVENOUS; SUBCUTANEOUS at 20:27

## 2024-11-15 RX ADMIN — METHOCARBAMOL 500 MG: 500 TABLET ORAL at 14:27

## 2024-11-15 RX ADMIN — PROPOFOL 200 MG: 10 INJECTION, EMULSION INTRAVENOUS at 18:54

## 2024-11-15 RX ADMIN — HYDROMORPHONE HYDROCHLORIDE 1 MG: 1 INJECTION, SOLUTION INTRAMUSCULAR; INTRAVENOUS; SUBCUTANEOUS at 08:37

## 2024-11-15 RX ADMIN — ONDANSETRON 4 MG: 2 INJECTION, SOLUTION INTRAMUSCULAR; INTRAVENOUS at 18:59

## 2024-11-15 RX ADMIN — FENTANYL CITRATE 50 MCG: 50 INJECTION INTRAMUSCULAR; INTRAVENOUS at 18:54

## 2024-11-15 RX ADMIN — METOPROLOL SUCCINATE 50 MG: 50 TABLET, EXTENDED RELEASE ORAL at 21:28

## 2024-11-15 RX ADMIN — HYDROMORPHONE HYDROCHLORIDE 1 MG: 1 INJECTION, SOLUTION INTRAMUSCULAR; INTRAVENOUS; SUBCUTANEOUS at 20:15

## 2024-11-15 RX ADMIN — MIDAZOLAM 2 MG: 1 INJECTION INTRAMUSCULAR; INTRAVENOUS at 18:49

## 2024-11-15 RX ADMIN — LOSARTAN POTASSIUM 100 MG: 50 TABLET, FILM COATED ORAL at 21:05

## 2024-11-15 RX ADMIN — PROPOFOL 150 MG: 10 INJECTION, EMULSION INTRAVENOUS at 13:32

## 2024-11-15 RX ADMIN — HYDROMORPHONE HYDROCHLORIDE 1 MG: 1 INJECTION, SOLUTION INTRAMUSCULAR; INTRAVENOUS; SUBCUTANEOUS at 20:44

## 2024-11-15 RX ADMIN — HYDROMORPHONE HYDROCHLORIDE 0.5 MG: 1 INJECTION, SOLUTION INTRAMUSCULAR; INTRAVENOUS; SUBCUTANEOUS at 10:35

## 2024-11-15 RX ADMIN — FENTANYL CITRATE 50 MCG: 50 INJECTION INTRAMUSCULAR; INTRAVENOUS at 20:10

## 2024-11-15 RX ADMIN — HYDROMORPHONE HYDROCHLORIDE 1 MG: 1 INJECTION, SOLUTION INTRAMUSCULAR; INTRAVENOUS; SUBCUTANEOUS at 14:41

## 2024-11-15 RX ADMIN — LIDOCAINE HYDROCHLORIDE 10 ML: 20 INJECTION, SOLUTION EPIDURAL; INFILTRATION; INTRACAUDAL at 13:27

## 2024-11-15 RX ADMIN — FENTANYL CITRATE 100 MCG: 50 INJECTION INTRAMUSCULAR; INTRAVENOUS at 19:01

## 2024-11-15 RX ADMIN — SODIUM CHLORIDE, SODIUM LACTATE, POTASSIUM CHLORIDE, AND CALCIUM CHLORIDE: .6; .31; .03; .02 INJECTION, SOLUTION INTRAVENOUS at 18:54

## 2024-11-15 RX ADMIN — ROCURONIUM 20 MG: 50 INJECTION, SOLUTION INTRAVENOUS at 19:20

## 2024-11-15 RX ADMIN — PROPOFOL 150 MG: 10 INJECTION, EMULSION INTRAVENOUS at 13:44

## 2024-11-15 RX ADMIN — ACETAMINOPHEN 650 MG: 325 TABLET, FILM COATED ORAL at 14:27

## 2024-11-15 RX ADMIN — SODIUM CHLORIDE 1000 ML: 0.9 INJECTION, SOLUTION INTRAVENOUS at 08:41

## 2024-11-15 NOTE — Clinical Note
Case was discussed with Surgery resident and PA and the patient's admission status was agreed to be Admission Status: observation status to the service of Dr. Goodson .

## 2024-11-15 NOTE — ASSESSMENT & PLAN NOTE
44-year-old male with perianal pain most likely in the setting of perianal abscess    Plan  N.p.o.  IV fluids  IV antibiotics  As needed pain meds and antinausea meds  DVT prophylaxis  Failed bedside attempt at I&D  Will proceed the OR for EUA  Keep in ED and then go to the OR 1 time for OR around 4/430

## 2024-11-15 NOTE — ANESTHESIA PREPROCEDURE EVALUATION
Procedure:  EXAM UNDER ANESTHESIA (EUA) (Anus)    Relevant Problems   CARDIO   (+) Acute exacerbation of CHF (congestive heart failure) (HCC)   (+) Primary hypertension      /RENAL   (+) Stage 3 chronic kidney disease (HCC)      Behavioral Health   (+) Alcohol abuse   (+) Marijuana use   (+) Tobacco dependence      Cardiovascular/Peripheral Vascular   (+) Chronic HFrEF (heart failure with reduced ejection fraction) (HCC)      Other   (+) Abnormal EKG   (+) Perianal abscess   (+) Tachycardia     Ate full breakfast at 10:00 AM. Will postpone non-emergent surgery until 6:00 PM, per NPO guidelines.     ECHO     Left Ventricle: Left ventricular cavity size is moderately dilated. Wall thickness is mildly increased. There is eccentric hypertrophy. The left ventricular ejection fraction is 40%. Systolic function is moderately reduced. There is moderate global hypokinesis. Diastolic function is moderately abnormal, consistent with grade II (pseudonormal) relaxation.    Right Ventricle: Right ventricular cavity size is at the upper limit of normal. Systolic function is normal.    Left Atrium: The atrium is moderately dilated.    Right Atrium: The atrium is mildly dilated.    Aortic Valve: There is mild regurgitation.    Mitral Valve: There is moderate regurgitation.    Tricuspid Valve: There is mild regurgitation. The estimated right ventricular systolic pressure is 70.00 mmHg.    ECHO 9/12/23      Stress ECG: No ST deviation is noted. The stress ECG is negative for ischemia after maximal exercise, without reproduction of symptoms.    Stress Function: Left ventricular function post-stress is abnormal. Global function is severely reduced. Stress ejection fraction is 27 %.    Stress ECG: Overall, the patient's exercise capacity was normal for their age. The patient reached stage 4.0 of the protocol after exercising for 10 min and 30 sec and had a maximal HR of 157 bpm (88 % of MPHR) and 13.4 METS.    Perfusion: There is a  left ventricular perfusion defect that is medium in size present in the basal to mid inferior location(s) that is paradoxical. The defect appears to be an artifact caused by diaphragmatic activity. There is a left ventricular perfusion defect that is small in size present in the mid to apical anterior location(s) that is paradoxical. The defect appears to be an artifact.    Stress Combined Conclusion: Abnormal study. Severely reduced left ventricular function globally. There is image artifact, without diagnostic evidence for perfusion abnormality. Likely nonischemic cardiomyopathy.       Latest Reference Range & Units 09/02/23 05:03 09/02/23 11:54 09/03/23 05:13 09/04/23 06:02 11/15/24 08:37   Creatinine 0.60 - 1.30 mg/dL 1.33 (H) 1.39 (H) 1.51 (H) 1.46 (H) 1.52 (H)   (H): Data is abnormally high      Physical Exam    Airway    Mallampati score: III  TM Distance: >3 FB  Neck ROM: full     Dental        Cardiovascular      Pulmonary      Other Findings        Anesthesia Plan  ASA Score- 3     Anesthesia Type- general with ASA Monitors.         Additional Monitors:     Airway Plan: ETT.           Plan Factors-Exercise tolerance (METS): >4 METS.    Chart reviewed. EKG reviewed. Imaging results reviewed. Existing labs reviewed. Patient summary reviewed.    Patient is a current smoker.  Patient smoked on day of surgery.            Induction- intravenous.    Postoperative Plan- Plan for postoperative opioid use.     Perioperative Resuscitation Plan - Level 1 - Full Code.       Informed Consent- Anesthetic plan and risks discussed with patient.  I personally reviewed this patient with the CRNA. Discussed and agreed on the Anesthesia Plan with the CRNA..

## 2024-11-15 NOTE — ED ATTENDING ATTESTATION
11/15/2024  IDavidson DO, saw and evaluated the patient. I have discussed the patient with the resident/non-physician practitioner and agree with the resident's/non-physician practitioner's findings, Plan of Care, and MDM as documented in the resident's/non-physician practitioner's note, except where noted. All available labs and Radiology studies were reviewed.  I was present for key portions of any procedure(s) performed by the resident/non-physician practitioner and I was immediately available to provide assistance.       At this point I agree with the current assessment done in the Emergency Department.  I have conducted an independent evaluation of this patient a history and physical is as follows:          1. Perirectal abscess            MDM  Number of Diagnoses or Management Options  Perirectal abscess  Diagnosis management comments:       Initial ED assessment:   44-year-old male known perirectal abscess, had it drained 2 days prior now much worse, returns for worsening pain    Pathology at risk for includes but is not limited to:   Reaccumulation of perirectal abscess, other loculation that was not drained, early Armond's gangrene    Initial ED plan:   CT scan, blood work        Final ED summary/disposition:   After evaluation and workup in the emergency department, ultimately patient was found to have a perianal abscess, attempted drainage in the emergency department by surgery team under procedural sedation, unfortunately this was unsuccessful in locating the abscess pocket so patient was brought to the operating room under colorectal surgery for exam and I&D under anesthesia               Time reflects when diagnosis was documented in both MDM as applicable and the Disposition within this note       Time User Action Codes Description Comment    11/15/2024  8:40 AM Dieudonne Roy Add [K61.1] Perirectal abscess           ED Disposition       ED Disposition   Send to OR    Condition   --     Date/Time   Fri Nov 15, 2024  1:56 PM    Comment   --             Follow-up Information    None                       Chief Complaint   Patient presents with    Yolis-Rectal Abscess     Previously had rectal abscess drained but pain is unbearable and he cannot sleep.                    44-year-old male, presents with perianal pain, recently seen here and had I&D of a perianal abscess, says the pain improved slightly while he was here but still had pain upon coming home has had continued pain since.,  No fevers no chills, has had a bowel movement since but is extremely painful.      History provided by:  Patient                    Physical Exam  Vitals reviewed.   Constitutional:       General: He is not in acute distress.     Appearance: He is well-developed.   HENT:      Head: Atraumatic.      Nose: Nose normal.   Eyes:      General: No scleral icterus.        Right eye: No discharge.         Left eye: No discharge.      Conjunctiva/sclera: Conjunctivae normal.   Neck:      Trachea: No tracheal deviation.   Pulmonary:      Effort: Pulmonary effort is normal. No respiratory distress.      Breath sounds: No stridor.   Genitourinary:     Comments: Normal-appearing anus, perianal patient has an area of recent  incision, no palpable fluctuance but any palpation this area induces a large amount of discomfort.  Deferred digital rectal exam as patient refused saying when he had it few days prior was extremely painful  Musculoskeletal:         General: No deformity.      Cervical back: Normal range of motion.   Skin:     General: Skin is warm and dry.      Coloration: Skin is not pale.      Findings: No erythema or rash.   Neurological:      Mental Status: He is alert.      Motor: No abnormal muscle tone.      Coordination: Coordination normal.               ED Course as of 11/15/24 1458   Fri Nov 15, 2024   1410 Unfortunately incision and drainage of abscess was unsuccessful, surgery team could not at bedside find the  abscess pocket, patient tolerated sedation well but patient will be taken to the OR for exam under anesthesia and I&D   1428 Postprocedural sedation patient now fully awake.,  We had a long conversation about what happened, patient understands, await patient to be taken to the OR          Medications   HYDROmorphone (DILAUDID) injection 1 mg (1 mg Intravenous Not Given 11/15/24 1326)   ceftriaxone (ROCEPHIN) 1 g/50 mL in dextrose IVPB (1,000 mg Intravenous New Bag 11/15/24 1431)   metroNIDAZOLE (FLAGYL) IVPB (premix) 500 mg 100 mL (has no administration in time range)   lactated ringers infusion (has no administration in time range)   ceFAZolin (ANCEF) IVPB (premix in dextrose) 2,000 mg 50 mL (has no administration in time range)   oxyCODONE (ROXICODONE) IR tablet 5 mg (has no administration in time range)   oxyCODONE (ROXICODONE) immediate release tablet 10 mg (has no administration in time range)   HYDROmorphone (DILAUDID) injection 0.5 mg (has no administration in time range)   acetaminophen (TYLENOL) tablet 650 mg (650 mg Oral Given 11/15/24 1427)   gabapentin (NEURONTIN) capsule 100 mg (has no administration in time range)   methocarbamol (ROBAXIN) tablet 500 mg (500 mg Oral Given 11/15/24 1427)   ondansetron (ZOFRAN) injection 4 mg (has no administration in time range)   heparin (porcine) subcutaneous injection 5,000 Units (5,000 Units Subcutaneous Not Given 11/15/24 1426)   sodium chloride 0.9 % bolus 1,000 mL (0 mL Intravenous Stopped 11/15/24 1034)   HYDROmorphone (DILAUDID) injection 1 mg (1 mg Intravenous Given 11/15/24 0837)   iohexol (OMNIPAQUE) 350 MG/ML injection (MULTI-DOSE) 80 mL (80 mL Intravenous Given 11/15/24 0952)   HYDROmorphone (DILAUDID) injection 0.5 mg (0.5 mg Intravenous Given 11/15/24 1035)   lidocaine (PF) (XYLOCAINE-MPF) 2 % injection 10 mL (10 mL Infiltration Given by Other 11/15/24 1327)   propofol (DIPRIVAN) 200 MG/20ML bolus injection 150 mg (150 mg Intravenous Given by Other  11/15/24 1332)   sodium chloride 0.9 % bolus 1,000 mL (1,000 mL Intravenous New Bag 11/15/24 1326)   propofol (DIPRIVAN) 200 MG/20ML bolus injection 150 mg (150 mg Intravenous Given 11/15/24 1344)   HYDROmorphone (DILAUDID) injection 1 mg (1 mg Intravenous Given 11/15/24 1441)             Labs Reviewed   CBC AND DIFFERENTIAL - Abnormal       Result Value Ref Range Status    WBC 9.64  4.31 - 10.16 Thousand/uL Final    RBC 4.10  3.88 - 5.62 Million/uL Final    Hemoglobin 13.3  12.0 - 17.0 g/dL Final    Hematocrit 40.7  36.5 - 49.3 % Final    MCV 99 (*) 82 - 98 fL Final    MCH 32.4  26.8 - 34.3 pg Final    MCHC 32.7  31.4 - 37.4 g/dL Final    RDW 11.3 (*) 11.6 - 15.1 % Final    MPV 10.5  8.9 - 12.7 fL Final    Platelets 211  149 - 390 Thousands/uL Final    nRBC 0  /100 WBCs Final    Segmented % 81 (*) 43 - 75 % Final    Immature Grans % 0  0 - 2 % Final    Lymphocytes % 13 (*) 14 - 44 % Final    Monocytes % 5  4 - 12 % Final    Eosinophils Relative 0  0 - 6 % Final    Basophils Relative 1  0 - 1 % Final    Absolute Neutrophils 7.80 (*) 1.85 - 7.62 Thousands/µL Final    Absolute Immature Grans 0.03  0.00 - 0.20 Thousand/uL Final    Absolute Lymphocytes 1.22  0.60 - 4.47 Thousands/µL Final    Absolute Monocytes 0.49  0.17 - 1.22 Thousand/µL Final    Eosinophils Absolute 0.03  0.00 - 0.61 Thousand/µL Final    Basophils Absolute 0.07  0.00 - 0.10 Thousands/µL Final   COMPREHENSIVE METABOLIC PANEL - Abnormal    Sodium 138  135 - 147 mmol/L Final    Potassium 4.0  3.5 - 5.3 mmol/L Final    Chloride 106  96 - 108 mmol/L Final    CO2 26  21 - 32 mmol/L Final    ANION GAP 6  4 - 13 mmol/L Final    BUN 15  5 - 25 mg/dL Final    Creatinine 1.52 (*) 0.60 - 1.30 mg/dL Final    Comment: Standardized to IDMS reference method    Glucose 109  65 - 140 mg/dL Final    Comment: If the patient is fasting, the ADA then defines impaired fasting glucose as > 100 mg/dL and diabetes as > or equal to 123 mg/dL.    Calcium 9.6  8.4 - 10.2 mg/dL  Final    AST 28  13 - 39 U/L Final    ALT 29  7 - 52 U/L Final    Comment: Specimen collection should occur prior to Sulfasalazine administration due to the potential for falsely depressed results.     Alkaline Phosphatase 68  34 - 104 U/L Final    Total Protein 7.8  6.4 - 8.4 g/dL Final    Albumin 4.3  3.5 - 5.0 g/dL Final    Total Bilirubin 0.69  0.20 - 1.00 mg/dL Final    Comment: Use of this assay is not recommended for patients undergoing treatment with eltrombopag due to the potential for falsely elevated results.  N-acetyl-p-benzoquinone imine (metabolite of Acetaminophen) will generate erroneously low results in samples for patients that have taken an overdose of Acetaminophen.    eGFR 54  ml/min/1.73sq m Final    Narrative:     [employer] guidelines for Chronic Kidney Disease (CKD):     Stage 1 with normal or high GFR (GFR > 90 mL/min/1.73 square meters)    Stage 2 Mild CKD (GFR = 60-89 mL/min/1.73 square meters)    Stage 3A Moderate CKD (GFR = 45-59 mL/min/1.73 square meters)    Stage 3B Moderate CKD (GFR = 30-44 mL/min/1.73 square meters)    Stage 4 Severe CKD (GFR = 15-29 mL/min/1.73 square meters)    Stage 5 End Stage CKD (GFR <15 mL/min/1.73 square meters)  Note: GFR calculation is accurate only with a steady state creatinine         CT pelvis w contrast   Final Result      Left anorectal region demonstrates a hypodensity with surrounding wall thickening measuring 1.4 x 1.0 x 2.5 cm      Workstation performed: [id number]                        Pre-Procedural Sedation    Performed by: Davidson Vazquez DO  Authorized by: Davidson Vazquez DO    Consent:     Consent obtained:  Verbal    Consent given by:  Patient    Risks discussed:  Allergic reaction, dysrhythmia, prolonged hypoxia resulting in organ damage, prolonged sedation necessitating reversal, inadequate sedation, respiratory compromise necessitating ventilatory assistance and intubation, nausea and vomiting     Alternatives discussed:  Analgesia without sedation  Universal protocol:     Patient identity confirmation method:  Verbally with patient and hospital-assigned identification number  Indications:     Intended level of sedation:  Moderate (conscious sedation)  Pre-sedation assessment:     NPO status caution: urgency dictates proceeding with non-ideal NPO status      ASA classification: class 2 - patient with mild systemic disease      Mouth opening:  3 or more finger widths    Thyromental distance:  4 finger widths    Mallampati score:  II - soft palate, uvula, fauces visible    Pre-sedation assessments completed and reviewed: airway patency, cardiovascular function, hydration status, mental status, nausea/vomiting, pain level, respiratory function and temperature      Pre-sedation assessment completed:  11/15/2024 1:30 PM  Procedural Sedation    Date/Time: 11/15/2024 1:31 PM    Performed by: Davidson Vazquez DO  Authorized by: Davidson Vazquez DO    Immediate pre-procedure details:     Reviewed: vital signs    Procedure details (see MAR for exact dosages):     Sedation start time:  11/15/2024 1:31 PM    Preoxygenation:  Nasal cannula    Sedation:  Propofol    Intra-procedure monitoring:  Blood pressure monitoring, cardiac monitor, continuous pulse oximetry, continuous capnometry, frequent LOC assessments and frequent vital sign checks    Intra-procedure events: none      Sedation end time:  11/15/2024 1:57 PM    Total sedation time (minutes):  26  Post-procedure details:     Post-sedation assessment completed:  11/15/2024 2:18 PM    Attendance: Constant attendance by certified staff until patient recovered      Recovery: Patient returned to pre-procedure baseline      Post-sedation assessments completed and reviewed: airway patency, cardiovascular function, hydration status, mental status, nausea/vomiting, pain level, respiratory function and temperature      Patient is stable for discharge or admission: yes       Patient tolerance:  Tolerated well, no immediate complications  CriticalCare Time    Date/Time: 11/15/2024 2:20 PM    Performed by: Davidson Vazquez DO  Authorized by: Davidson Vazquez DO    Critical care provider statement:     Critical care time (minutes):  45    Critical care time was exclusive of:  Separately billable procedures and treating other patients and teaching time    Critical care was necessary to treat or prevent imminent or life-threatening deterioration of the following conditions: Perirectal abscess, requiring emergent surgery.    Critical care was time spent personally by me on the following activities:  Obtaining history from patient or surrogate, development of treatment plan with patient or surrogate, discussions with consultants, evaluation of patient's response to treatment, examination of patient, ordering and performing treatments and interventions, ordering and review of laboratory studies, ordering and review of radiographic studies, re-evaluation of patient's condition and review of old charts

## 2024-11-15 NOTE — PROCEDURES
Patient was consented for an incision and drainage of the left perianal abscess. The details of the procedure along with the risks, benefits, alternatives were discussed with the patient. The patient gave both written and verbal consent. With the assistance of the ED providers, conscious sedation was performed.  Prior to conscious sedation, the patient was connected to the appropriate anesthesia monitoring system.  After conscious sedation was induced, the patient was placed in left lateral decubitus position.  Local anesthesia was provided.  The area was prepped and draped in sterile fashion.  Using an 11 blade the previous incision was extended both anteriorly and posteriorly.  It was believed that there was a loculated pocket in the anterolateral position, however, there is no obvious drainage of pus.  After thorough the loculation, the wound was copiously irrigated with normal saline.  The wound was then packed with a 4 x 4 gauze and was dressed with more 4 x 4 gauze, ABD pad, and tape.  Unfortunately given that there was no drainage of pus, we will elect to proceed with an EUA for further determination of whether or not the abscess was completely drained.  Patient was booked and consented for an exam under anesthesia with the colorectal surgery team for today.

## 2024-11-15 NOTE — ED PROVIDER NOTES
Time reflects when diagnosis was documented in both MDM as applicable and the Disposition within this note       Time User Action Codes Description Comment    11/15/2024  8:40 AM Dieudonne Roy Add [K61.1] Perirectal abscess           ED Disposition       ED Disposition   Send to OR    Condition   --    Date/Time   Fri Nov 15, 2024  1:56 PM    Comment   --             Assessment & Plan       Medical Decision Making  Asif Kruse is a 44 y.o. with a history of CKD, recurrent perirectal abscess coming in for abscess pain with recent ED visit two days prior for similar concerns.    DDX includes but is not limited to abscess versus necrotizing fasciitis.  Lab finding: CBC and CMP grossly unremarkable.   Plan/treatment : CT pelvis - Left anorectal region demonstrates a hypodensity with surrounding wall thickening measuring 1.4 x 1.0 x 2.5 cm. Surgery consulted for assistance in I&D bedside. Light sedation. Procedure done by surgery at bedside in ED. Further evaluation necessary as need to explore deeper in tissue - need to go to OR.    I discussed plan with patient and family at bedside, and they understand and agree. Will send to OR.      Amount and/or Complexity of Data Reviewed  Labs: ordered.  Radiology: ordered.    Risk  Prescription drug management.  Decision regarding hospitalization.             Medications   HYDROmorphone (DILAUDID) injection 1 mg (1 mg Intravenous Not Given 11/15/24 1326)   ceftriaxone (ROCEPHIN) 1 g/50 mL in dextrose IVPB (1,000 mg Intravenous New Bag 11/15/24 1431)   metroNIDAZOLE (FLAGYL) IVPB (premix) 500 mg 100 mL (has no administration in time range)   lactated ringers infusion (has no administration in time range)   ceFAZolin (ANCEF) IVPB (premix in dextrose) 2,000 mg 50 mL (has no administration in time range)   oxyCODONE (ROXICODONE) IR tablet 5 mg (has no administration in time range)   oxyCODONE (ROXICODONE) immediate release tablet 10 mg (has no administration in time range)    HYDROmorphone (DILAUDID) injection 0.5 mg (has no administration in time range)   acetaminophen (TYLENOL) tablet 650 mg (650 mg Oral Given 11/15/24 1427)   gabapentin (NEURONTIN) capsule 100 mg (has no administration in time range)   methocarbamol (ROBAXIN) tablet 500 mg (500 mg Oral Given 11/15/24 1427)   ondansetron (ZOFRAN) injection 4 mg (has no administration in time range)   heparin (porcine) subcutaneous injection 5,000 Units (5,000 Units Subcutaneous Not Given 11/15/24 1426)   sodium chloride 0.9 % bolus 1,000 mL (0 mL Intravenous Stopped 11/15/24 1034)   HYDROmorphone (DILAUDID) injection 1 mg (1 mg Intravenous Given 11/15/24 0837)   iohexol (OMNIPAQUE) 350 MG/ML injection (MULTI-DOSE) 80 mL (80 mL Intravenous Given 11/15/24 0952)   HYDROmorphone (DILAUDID) injection 0.5 mg (0.5 mg Intravenous Given 11/15/24 1035)   lidocaine (PF) (XYLOCAINE-MPF) 2 % injection 10 mL (10 mL Infiltration Given by Other 11/15/24 1327)   propofol (DIPRIVAN) 200 MG/20ML bolus injection 150 mg (150 mg Intravenous Given by Other 11/15/24 1332)   sodium chloride 0.9 % bolus 1,000 mL (1,000 mL Intravenous New Bag 11/15/24 1326)   propofol (DIPRIVAN) 200 MG/20ML bolus injection 150 mg (150 mg Intravenous Given 11/15/24 1344)   HYDROmorphone (DILAUDID) injection 1 mg (1 mg Intravenous Given 11/15/24 1441)       ED Risk Strat Scores                                               History of Present Illness       Chief Complaint   Patient presents with    Yolis-Rectal Abscess     Previously had rectal abscess drained but pain is unbearable and he cannot sleep.        Past Medical History:   Diagnosis Date    Alcohol abuse, daily use     Chronic HFrEF (heart failure with reduced ejection fraction) (HCC) 09/01/2023    Chronic kidney disease (CKD)     Hypertension     Tobacco abuse       Past Surgical History:   Procedure Laterality Date    BACK SURGERY      FINGER SURGERY Right     KNEE SURGERY Right     SHOULDER ARTHROPLASTY Right        Family History   Problem Relation Age of Onset    Heart disease Mother     Hypertension Mother     Heart failure Mother     Substance Abuse Mother     No Known Problems Father     No Known Problems Sister     No Known Problems Sister     Hypertension Brother     No Known Problems Maternal Grandmother     No Known Problems Maternal Grandfather     No Known Problems Paternal Grandmother     No Known Problems Paternal Grandfather     No Known Problems Daughter     Sudden death Neg Hx       Social History     Tobacco Use    Smoking status: Every Day     Current packs/day: 0.25     Types: Cigarettes    Smokeless tobacco: Never   Vaping Use    Vaping status: Never Used   Substance Use Topics    Alcohol use: Not Currently     Alcohol/week: 56.0 standard drinks of alcohol     Types: 56 Shots of liquor per week     Comment: Previously drinking pint of liquor (cognac) daily; abstaining since 09/2023 (Updated 09/08/2023).    Drug use: Yes     Types: Marijuana      E-Cigarette/Vaping    E-Cigarette Use Never User       E-Cigarette/Vaping Substances      I have reviewed and agree with the history as documented.     The patient is a 44-year-old male with PMH of HTN, CKD, tobacco use, recurrent left perirectal abscess, coming in for abscess pain.  He was recently seen in the ED on 11/13 for perirectal abscess.  At that time, I&D was performed and a prescription of 7-day course of Keflex was given.  He has been taking his abx. He is coming in today due to unbearable pain in the abscess area.  He states that the pain is exacerbated by standing or lying down, alleviated by sitting.  He currently rates the pain a 10 out of 10.  No radiation of pain, numbness, or weakness of the legs.  Has not noticed any purulence coming out of abscess.  He has been cleaning the abscess with baby wipes and showering.    Has had 2 prior episodes of these abscesses in the past: In 2012 and 2021.  Denies fevers, chills, chest pain, shortness of breath,  abdominal pain, nausea, vomiting, diarrhea, constipation, weakness.          Review of Systems   Constitutional:  Negative for chills and fever.   HENT:  Negative for sore throat.    Respiratory:  Negative for cough and shortness of breath.    Cardiovascular:  Negative for chest pain, palpitations and leg swelling.   Gastrointestinal:  Negative for abdominal pain, diarrhea, nausea and vomiting.   Genitourinary:  Negative for dysuria and hematuria.        Yolis-rectal abscess with pain   Psychiatric/Behavioral:  Negative for dysphoric mood.            Objective       ED Triage Vitals   Temperature Pulse Blood Pressure Respirations SpO2 Patient Position - Orthostatic VS   11/15/24 0757 11/15/24 0757 11/15/24 0757 11/15/24 0757 11/15/24 0757 11/15/24 0757   97.9 °F (36.6 °C) 84 (!) 188/115 18 100 % Sitting      Temp Source Heart Rate Source BP Location FiO2 (%) Pain Score    11/15/24 0757 11/15/24 0757 11/15/24 0757 -- 11/15/24 0837    Oral Monitor Right arm  8      Vitals      Date and Time Temp Pulse SpO2 Resp BP Pain Score FACES Pain Rating User   11/15/24 1427 -- -- -- -- -- 10 - Worst Possible Pain -- CS   11/15/24 1356 -- 70 100 % 12 158/89 -- -- AP   11/15/24 1350 -- 69 100 % 15 144/83 -- -- AP   11/15/24 1349 -- 75 100 % 15 152/84 -- -- AP   11/15/24 1342 -- 79 100 % 13 183/81 -- -- AP   11/15/24 1337 -- 74 93 % 11 172/127 -- -- AP   11/15/24 1331 -- 75 100 % 9 183/109 -- -- AP   11/15/24 1035 -- -- -- -- -- 8 -- AP   11/15/24 0837 -- -- -- -- -- 8 -- AP   11/15/24 0757 97.9 °F (36.6 °C) 84 100 % 18 188/115 -- -- SM            Physical Exam  Constitutional:       General: He is not in acute distress.     Appearance: He is well-developed. He is not ill-appearing or diaphoretic.   HENT:      Head: Normocephalic and atraumatic.   Eyes:      General:         Right eye: No discharge.         Left eye: No discharge.      Conjunctiva/sclera: Conjunctivae normal.   Cardiovascular:      Rate and Rhythm: Normal rate and  regular rhythm.      Heart sounds: No murmur heard.  Pulmonary:      Effort: Pulmonary effort is normal. No respiratory distress.      Breath sounds: Normal breath sounds.   Abdominal:      General: There is no distension.      Palpations: Abdomen is soft.      Tenderness: There is no abdominal tenderness. There is no guarding.   Genitourinary:     Comments: 1 cm x 1 centimeter open abscess without purulence.  Musculoskeletal:         General: No swelling.      Right lower leg: No edema.      Left lower leg: No edema.   Skin:     Findings: No bruising.   Neurological:      Mental Status: He is alert and oriented to person, place, and time.      Motor: No weakness.   Psychiatric:         Mood and Affect: Mood normal.         Results Reviewed       Procedure Component Value Units Date/Time    Comprehensive metabolic panel [[id number]]  (Abnormal) Collected: 11/15/24 0837    Lab Status: Final result Specimen: Blood from Arm, Left Updated: 11/15/24 0936     Sodium 138 mmol/L      Potassium 4.0 mmol/L      Chloride 106 mmol/L      CO2 26 mmol/L      ANION GAP 6 mmol/L      BUN 15 mg/dL      Creatinine 1.52 mg/dL      Glucose 109 mg/dL      Calcium 9.6 mg/dL      AST 28 U/L      ALT 29 U/L      Alkaline Phosphatase 68 U/L      Total Protein 7.8 g/dL      Albumin 4.3 g/dL      Total Bilirubin 0.69 mg/dL      eGFR 54 ml/min/1.73sq m     Narrative:      National [employer] guidelines for Chronic Kidney Disease (CKD):     Stage 1 with normal or high GFR (GFR > 90 mL/min/1.73 square meters)    Stage 2 Mild CKD (GFR = 60-89 mL/min/1.73 square meters)    Stage 3A Moderate CKD (GFR = 45-59 mL/min/1.73 square meters)    Stage 3B Moderate CKD (GFR = 30-44 mL/min/1.73 square meters)    Stage 4 Severe CKD (GFR = 15-29 mL/min/1.73 square meters)    Stage 5 End Stage CKD (GFR <15 mL/min/1.73 square meters)  Note: GFR calculation is accurate only with a steady state creatinine    CBC and differential [[id number]]   (Abnormal) Collected: 11/15/24 0837    Lab Status: Final result Specimen: Blood from Arm, Left Updated: 11/15/24 0849     WBC 9.64 Thousand/uL      RBC 4.10 Million/uL      Hemoglobin 13.3 g/dL      Hematocrit 40.7 %      MCV 99 fL      MCH 32.4 pg      MCHC 32.7 g/dL      RDW 11.3 %      MPV 10.5 fL      Platelets 211 Thousands/uL      nRBC 0 /100 WBCs      Segmented % 81 %      Immature Grans % 0 %      Lymphocytes % 13 %      Monocytes % 5 %      Eosinophils Relative 0 %      Basophils Relative 1 %      Absolute Neutrophils 7.80 Thousands/µL      Absolute Immature Grans 0.03 Thousand/uL      Absolute Lymphocytes 1.22 Thousands/µL      Absolute Monocytes 0.49 Thousand/µL      Eosinophils Absolute 0.03 Thousand/µL      Basophils Absolute 0.07 Thousands/µL             CT pelvis w contrast   Final Interpretation by Holden Hinson MD (11/15 1029)      Left anorectal region demonstrates a hypodensity with surrounding wall thickening measuring 1.4 x 1.0 x 2.5 cm      Workstation performed: [id number]             Incision and drain    Date/Time: 11/15/2024 2:10 PM    Performed by: Dieudonne Roy MD  Authorized by: Dieudonne Roy MD  Universal Protocol:  Patient understanding: patient states understanding of the procedure being performed  Patient consent: the patient's understanding of the procedure matches consent given    Patient location:  ED  Location:     Type:  Abscess    Location: perirectal.  Comments:      Initial procedure done by surgery at bedside in ED -> Will need go to to OR for further exploration and evaluation.      ED Medication and Procedure Management   Prior to Admission Medications   Prescriptions Last Dose Informant Patient Reported? Taking?   cephalexin (KEFLEX) 500 mg capsule   No No   Sig: Take 1 capsule (500 mg total) by mouth every 6 (six) hours for 7 days   furosemide (LASIX) 40 mg tablet   No No   Sig: TAKE 1 TABLET BY MOUTH EVERY DAY   losartan (COZAAR) 100 MG tablet   No No   Sig:  TAKE 1 TABLET BY MOUTH EVERY DAY   metoprolol succinate (TOPROL-XL) 50 mg 24 hr tablet   No No   Sig: TAKE 1 TABLET BY MOUTH TWICE A DAY   nicotine (NICODERM CQ) 14 mg/24hr TD 24 hr patch   No No   Sig: Place 1 patch on the skin over 24 hours every 24 hours   spironolactone (ALDACTONE) 25 mg tablet   No No   Sig: TAKE 1 TABLET (25 MG TOTAL) BY MOUTH DAILY.      Facility-Administered Medications: None     Patient's Medications   Discharge Prescriptions    No medications on file     No discharge procedures on file.  ED SEPSIS DOCUMENTATION   Time reflects when diagnosis was documented in both MDM as applicable and the Disposition within this note       Time User Action Codes Description Comment    11/15/2024  8:40 AM Dieudonne Roy Add [K61.1] Perirectal abscess                  Dieudonne Roy MD  11/15/24 3899

## 2024-11-15 NOTE — H&P
H&P - Colorectal   Name: Asif Kruse 44 y.o. male I MRN: [id number]  Unit/Bed#: ED-09 I Date of Admission: 11/15/2024   Date of Service: 11/15/2024 I Hospital Day: 0     Assessment & Plan  Perianal abscess  44-year-old male with perianal pain most likely in the setting of perianal abscess    Plan  N.p.o.  IV fluids  IV antibiotics  As needed pain meds and antinausea meds  DVT prophylaxis  Failed bedside attempt at I&D  Will proceed the OR for EUA  Keep in ED and then go to the OR 1 time for OR around 4/430    History of Present Illness   Asif Kruse is a 44 y.o. male who presents with perianal pain.  Previously came into the ED on 11/13 and had bedside attempt at incision and drainage of perianal abscess.  Patient represents with pain.  Past medical history includes: Alcohol abuse, chronic heart failure, CKD, HTN, tobacco abuse.  No pertinent past surgical history.  Patient presents afebrile but hypertensive. WBC 9.64. Hgb. 13.3. Cr 1.52. 11/15 CT AP demonstrated left anorectal region hypodensity with surrounding wall thickening measuring 1.4 x 1.0 x 2.5 cm. See above for assessment and plan:    Review of Systems  See above, otherwise negative    Objective :  Temp:  [97.9 °F (36.6 °C)] 97.9 °F (36.6 °C)  HR:  [69-84] 70  BP: (144-188)/() 158/89  Resp:  [9-18] 12  SpO2:  [93 %-100 %] 100 %  O2 Device: None (Room air)  Nasal Cannula O2 Flow Rate (L/min):  [3 L/min] 3 L/min      Physical Exam    Lab Results: I have reviewed the following results:  Recent Labs     11/15/24  0837   WBC 9.64   HGB 13.3   HCT 40.7      SODIUM 138   K 4.0      CO2 26   BUN 15   CREATININE 1.52*   GLUC 109   AST 28   ALT 29   ALB 4.3   TBILI 0.69   ALKPHOS 68       VTE Pharmacologic Prophylaxis: Heparin  VTE Mechanical Prophylaxis: sequential compression device

## 2024-11-15 NOTE — CONSULTS
Inpatient consult to Colorectal Surgery  Consult performed by: Edda Lamb PA-C  Consult ordered by: Davidson Vazquez,         Please see H&P from Robin Rodriguez, 11/15/24.

## 2024-11-16 LAB — RHODAMINE-AURAMINE STN SPEC: NORMAL

## 2024-11-16 NOTE — OP NOTE
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 INTERNAL SPINCHEROTOMY.  Incision and drainage of intersphincteric abscess REMOVAL OF PERIANAL GLAND/PERIANAL LESION    Specimen(s):  ID Type Source Tests Collected by Time Destination   1 : Perianal mass Tissue Abscess TISSUE EXAM MARK Tesfaye MD 11/15/2024 193    A :  Tissue Abscess ANAEROBIC CULTURE AND GRAM STAIN, FUNGAL CULTURE, CULTURE, TISSUE AND GRAM STAIN, AFB CULTURE WITH STAIN MARK Tesfaye MD 11/15/2024 192        Estimated Blood Loss:   Minimal    Drains:  * No LDAs found *    Anesthesia Type:   Choice    Operative Indications:  Perianal infection    Operative Findings:  Intersphincteric abscess  Intersphincteric fistula  Perianal nodule/lesion consistent with perianal gland      Complications:   None    Procedure and Technique:  The patient was placed in a prone jackknife position with the buttocks taped apart.  The patient had very large and muscular buttocks but the exposure was quite excellent after positioning.  The perianal area and buttocks were prepped using Betadine and draped in a sterile manner.  A timeout was done.    Inspection revealed a left anterior perianal wound from the previous incision and drainage attempt.  At the medial edge of that wound and just distal to the anal verge was a 1.5 cm diameter scarred mass.  Anoscopic and digital examination were normal except for at the left anterior position.  No internal fistula opening could be identified but there was a very subtle  thickening of the tissues in the left anterior region.  Rather than simply assume there was an abscess, a needle was placed into the intersphincteric space, revealing purulent material.  This was sent for culture.  The finding with the needle confirmed that this is an intersphincteric abscess.    An incision was made linearly extending radially away from the anal verge toward the previously created perianal incision.  The anal verge was identified and dissected with a hemostat without spreading the hemostat.  A tunnel was created and purulent material was identified.  This delineated the track of an intersphincteric abscess and fistula.  An internal sphincterotomy was performed over a probe passed into the intersphincteric fistula.  This unroofed a full length intersphincteric fistula.  Palpation then revealed that there was a cavity pressing into the external sphincter but without an identifiable fistula through the external sphincter into any fistula extending toward the buttock.  This seems to be an isolated cavity,  sphincter fibers rather than penetrating through to another area.  For that reason, I could not place a seton.  The area was debrided and edges of the wounds were cauterized.    We then addressed the perianal nodule which is suspected to be a scarred perianal gland.  This appears to be quite inflamed and scarred.  This is consistent with the patient's history of having 3 separate occurrences of anal abscess in the past.  This nodule was excised without dividing any sphincter fibers.  It was sent for pathologic evaluation as probable anal gland.    No remaining evidence of infection was present.  This appeared to be an acute on chronic process which was very limited in size but significant, clinically, nonetheless.    The wound was cleansed and hemostasis was achieved with cautery.  The area of incisions was anesthetized with quarter percent Marcaine and epinephrine.  Gauze dressing was  applied.      I was present for the entire procedure.    Patient Disposition:  PACU              SIGNATURE: MARIAH Tesfaye MD  DATE: November 15, 2024  TIME: 7:35 PM

## 2024-11-16 NOTE — ANESTHESIA POSTPROCEDURE EVALUATION
Post-Op Assessment Note    CV Status:  Stable    Pain management: adequate       Mental Status:  Alert   Hydration Status:  Stable   PONV Controlled:  None   Airway Patency:  Patent     Post Op Vitals Reviewed: Yes    No anethesia notable event occurred.    Staff: Anesthesiologist           Last Filed PACU Vitals:  Vitals Value Taken Time   Temp 98 °F (36.7 °C) 11/15/24 2040   Pulse 88 11/15/24 2045   /92 11/15/24 2045   Resp 18 11/15/24 2045   SpO2 97 % 11/15/24 2045       Modified Francisca:  Activity: 2 (11/15/2024  9:15 PM)  Respiration: 2 (11/15/2024  9:15 PM)  Circulation: 2 (11/15/2024  9:15 PM)  Consciousness: 2 (11/15/2024  9:15 PM)  Oxygen Saturation: 2 (11/15/2024  9:15 PM)  Modified Francisca Score: 10 (11/15/2024  9:15 PM)

## 2024-11-16 NOTE — DISCHARGE INSTR - AVS FIRST PAGE
Colorectal Surgery Discharge Instructions    Please follow-up within the next few days with the Colorectal surgery team     Activity:  - No lifting greater than 20 pounds or strenuous physical activity or exercise for 2 weeks.  - Walking and normal light activities are encouraged.  - Normal daily activities including climbing steps are okay.  - No driving until no longer using pain medications.    Return to work:    - You may return to work in 2 weeks or sooner if you are feeling well enough.    Diet:    - You may resume your normal diet.    Wound Care:  - May shower daily. No tub baths or swimming until cleared by your surgeon.  - Wash incision gently with soap and water and pat dry.  - Do not apply any creams or ointments unless instructed to do so by your surgeon.  - You may apply ice as needed (no longer than 20 minutes at a time) for the first 48 hours.  - Bruising is not unusual and will go away with a little time. You may apply a warm, moist compress that may help the bruising resolve quicker.  - You may remove the dressings the day after surgery (unless otherwise instructed). Leave any skin tapes (steri-strips) on the incision(s) in place until they fall off on their own. Any new dressings are optional.    Medications:    - You may resume all of your regular medications after discharge unless otherwise instructed. Please refer to your discharge medication list for further details.  - Please take the pain medications as directed.  - You are encouraged to use non-narcotic pain medications first and whenever possible. Reserve the use of narcotic pain medication for moderate to severe pain not controlled by non-narcotic medications.  - No driving while taking narcotic pain medications.  - You may become constipated, especially if taking pain medications. You may take any over the counter stool softeners or laxatives as needed. Examples: Milk of Magnesia, Colace, Senna.    Additional Instructions:  - If you have  any questions or concerns after discharge please call the office.  - Call office or return to ER if fever greater than 101, chills, persistent nausea/vomiting, worsening/uncontrollable pain, and/or increasing redness or purulent/foul smelling drainage from incision(s).

## 2024-11-16 NOTE — ANESTHESIA POSTPROCEDURE EVALUATION
Post-Op Assessment Note    CV Status:  Stable  Pain Score: 0    Pain management: adequate       Mental Status:  Alert and awake   Hydration Status:  Euvolemic   PONV Controlled:  Controlled   Airway Patency:  Patent  Airway: intubated     Post Op Vitals Reviewed: Yes    No anethesia notable event occurred.    Staff: CRNA           Last Filed PACU Vitals:  Vitals Value Taken Time   Temp     Pulse 84    /108    Resp 18    SpO2 100        Modified Francisca:  Activity: 2 (11/15/2024  1:58 PM)  Respiration: 2 (11/15/2024  1:58 PM)  Circulation: 2 (11/15/2024  1:58 PM)  Consciousness: 2 (11/15/2024  1:58 PM)  Oxygen Saturation: 2 (11/15/2024  1:58 PM)  Modified Francisca Score: 10 (11/15/2024  1:58 PM)

## 2024-11-17 LAB — BACTERIA SPEC ANAEROBE CULT: ABNORMAL

## 2024-11-18 LAB
BACTERIA TISS AEROBE CULT: ABNORMAL
FUNGUS SPEC CULT: NORMAL
GRAM STN SPEC: ABNORMAL
GRAM STN SPEC: ABNORMAL

## 2024-11-19 LAB
MYCOBACTERIUM SPEC CULT: NORMAL
RHODAMINE-AURAMINE STN SPEC: NORMAL

## 2024-11-25 PROCEDURE — 88342 IMHCHEM/IMCYTCHM 1ST ANTB: CPT | Performed by: PATHOLOGY

## 2024-11-25 PROCEDURE — 88341 IMHCHEM/IMCYTCHM EA ADD ANTB: CPT | Performed by: PATHOLOGY

## 2024-11-25 PROCEDURE — 88304 TISSUE EXAM BY PATHOLOGIST: CPT | Performed by: PATHOLOGY

## 2024-11-25 PROCEDURE — 88313 SPECIAL STAINS GROUP 2: CPT | Performed by: PATHOLOGY

## 2024-11-26 ENCOUNTER — RESULTS FOLLOW-UP (OUTPATIENT)
Dept: OTHER | Facility: HOSPITAL | Age: 44
End: 2024-11-26

## 2024-11-27 LAB
MYCOBACTERIUM SPEC CULT: NORMAL
RHODAMINE-AURAMINE STN SPEC: NORMAL

## 2024-12-01 LAB — FUNGUS SPEC CULT: NORMAL

## 2024-12-03 LAB
MYCOBACTERIUM SPEC CULT: NORMAL
RHODAMINE-AURAMINE STN SPEC: NORMAL

## 2024-12-05 ENCOUNTER — OFFICE VISIT (OUTPATIENT)
Dept: FAMILY MEDICINE CLINIC | Facility: CLINIC | Age: 44
End: 2024-12-05
Payer: COMMERCIAL

## 2024-12-05 VITALS
HEIGHT: 75 IN | BODY MASS INDEX: 38.3 KG/M2 | HEART RATE: 100 BPM | OXYGEN SATURATION: 95 % | WEIGHT: 308 LBS | DIASTOLIC BLOOD PRESSURE: 90 MMHG | RESPIRATION RATE: 16 BRPM | SYSTOLIC BLOOD PRESSURE: 152 MMHG

## 2024-12-05 DIAGNOSIS — F17.200 TOBACCO DEPENDENCE: Chronic | ICD-10-CM

## 2024-12-05 DIAGNOSIS — Z12.5 ENCOUNTER FOR SCREENING PROSTATE SPECIFIC ANTIGEN (PSA) MEASUREMENT: ICD-10-CM

## 2024-12-05 DIAGNOSIS — Z11.59 NEED FOR HEPATITIS C SCREENING TEST: ICD-10-CM

## 2024-12-05 DIAGNOSIS — I50.22 CHRONIC HFREF (HEART FAILURE WITH REDUCED EJECTION FRACTION) (HCC): Primary | Chronic | ICD-10-CM

## 2024-12-05 DIAGNOSIS — Z00.00 ANNUAL PHYSICAL EXAM: ICD-10-CM

## 2024-12-05 PROCEDURE — 99396 PREV VISIT EST AGE 40-64: CPT | Performed by: FAMILY MEDICINE

## 2024-12-05 PROCEDURE — 99214 OFFICE O/P EST MOD 30 MIN: CPT | Performed by: FAMILY MEDICINE

## 2024-12-05 NOTE — ASSESSMENT & PLAN NOTE
Wt Readings from Last 3 Encounters:   12/05/24 (!) 140 kg (308 lb)   11/13/24 (!) 138 kg (305 lb)   10/02/23 (!) 137 kg (303 lb)     Advised to follow-up with cardiologist on regular basis, and needs to lose weight and low-salt diet, refill on medications given          Orders:    Hemoglobin A1C; Future    Lipid panel; Future    TSH, 3rd generation; Future

## 2024-12-05 NOTE — PROGRESS NOTES
Adult Annual Physical  Name: Asif Kruse      : 1980      MRN: [id number]  Encounter Provider: Shaneka Miranda MD  Encounter Date: 2024   Encounter department: [facility]    Assessment & Plan  Tobacco dependence    Orders:    nicotine (NICODERM CQ) 7 mg/24hr TD 24 hr patch; Place 1 patch on the skin over 24 hours every 24 hours    Need for hepatitis C screening test    Orders:    Hepatitis C antibody; Future    Chronic HFrEF (heart failure with reduced ejection fraction) (HCC)  Wt Readings from Last 3 Encounters:   24 (!) 140 kg (308 lb)   24 (!) 138 kg (305 lb)   10/02/23 (!) 137 kg (303 lb)     Advised to follow-up with cardiologist on regular basis, and needs to lose weight and low-salt diet, refill on medications given          Orders:    Hemoglobin A1C; Future    Lipid panel; Future    TSH, 3rd generation; Future    Annual physical exam  Does not want vaccine for the flu ,pneumonia and tetanus       BMI 38.0-38.9,adult  Advised about low-fat diet and try to lose weight       Encounter for screening prostate specific antigen (PSA) measurement    Orders:    PSA, Total Screen; Future    Immunizations and preventive care screenings were discussed with patient today. Appropriate education was printed on patient's after visit summary.    Discussed risks and benefits of prostate cancer screening. We discussed the controversial history of PSA screening for prostate cancer in the [place] as well as the risk of over detection and over treatment of prostate cancer by way of PSA screening.  The patient understands that PSA blood testing is an imperfect way to screen for prostate cancer and that elevated PSA levels in the blood may also be caused by infection, inflammation, prostatic trauma or manipulation, urological procedures, or by benign prostatic enlargement.    The role of the digital rectal examination in prostate cancer screening was also discussed and I  discussed with him that there is large interobserver variability in the findings of digital rectal examination.    Counseling:  Dental Health: discussed importance of regular tooth brushing, flossing, and dental visits.  Exercise: the importance of regular exercise/physical activity was discussed. Recommend exercise 3-5 times per week for at least 30 minutes.          History of Present Illness     Adult Annual Physical:  Patient presents for annual physical. Annual checkup, and needs refill on medication he is out of medication and he does not follow cardiologist, history of congestive heart failure, no recent edema in his legs, denies any chest pain, works full-time, history of chronic kidney disease does not follow nephrologist, he has cut down smoking still smoking 7 cigarettes/day still use marijuana, stopped alcohol use for 2 months  Lives with wife and children.     Diet and Physical Activity:  - Diet/Nutrition: well balanced diet.  - Exercise: no formal exercise.    Depression Screening:  - PHQ-2 Score: 0    General Health:  - Sleep: sleeps well.  - Hearing: normal hearing bilateral ears.  - Vision: wears glasses.     Health:  - History of STDs: no.   - Urinary symptoms: none.     Review of Systems   Constitutional:  Negative for activity change, appetite change, chills, fatigue, fever and unexpected weight change.   HENT:  Negative for congestion, ear discharge, ear pain, nosebleeds, postnasal drip, rhinorrhea, sinus pressure, sneezing, sore throat, trouble swallowing and voice change.    Eyes:  Negative for photophobia, pain, discharge, redness and itching.   Respiratory:  Negative for cough, chest tightness, shortness of breath and wheezing.    Cardiovascular:  Negative for chest pain, palpitations and leg swelling.   Gastrointestinal:  Negative for abdominal pain, constipation, diarrhea, nausea and vomiting.   Endocrine: Negative for polyuria.   Genitourinary:  Negative for dysuria, frequency and  "urgency.   Musculoskeletal:  Negative for arthralgias, back pain, myalgias and neck pain.   Skin:  Negative for color change, pallor and rash.   Allergic/Immunologic: Negative for environmental allergies and food allergies.   Neurological:  Negative for dizziness, weakness, light-headedness and headaches.   Hematological:  Negative for adenopathy. Does not bruise/bleed easily.   Psychiatric/Behavioral:  Negative for behavioral problems. The patient is not nervous/anxious.          Objective   /90   Pulse 100   Resp 16   Ht 6' 3\" (1.905 m)   Wt (!) 140 kg (308 lb)   SpO2 95%   BMI 38.50 kg/m²     Physical Exam  Vitals and nursing note reviewed.   Constitutional:       General: He is not in acute distress.     Appearance: Normal appearance. He is not ill-appearing.   HENT:      Head: Normocephalic and atraumatic.      Right Ear: Tympanic membrane and ear canal normal.      Left Ear: Tympanic membrane and ear canal normal.      Nose: Nose normal. No rhinorrhea.      Mouth/Throat:      Mouth: Mucous membranes are moist.      Pharynx: Oropharynx is clear. No oropharyngeal exudate or posterior oropharyngeal erythema.   Eyes:      Extraocular Movements: Extraocular movements intact.      Conjunctiva/sclera: Conjunctivae normal.      Pupils: Pupils are equal, round, and reactive to light.   Cardiovascular:      Rate and Rhythm: Normal rate and regular rhythm.      Heart sounds: No murmur heard.  Pulmonary:      Effort: Pulmonary effort is normal.      Breath sounds: Normal breath sounds. No wheezing or rales.   Abdominal:      General: Abdomen is flat. Bowel sounds are normal. There is no distension.      Palpations: Abdomen is soft. There is no mass.      Tenderness: There is no abdominal tenderness. There is no guarding.   Musculoskeletal:         General: No swelling, tenderness, deformity or signs of injury. Normal range of motion.      Cervical back: Normal range of motion and neck supple.   Skin:     " Coloration: Skin is not jaundiced or pale.      Findings: No rash.   Neurological:      General: No focal deficit present.      Mental Status: He is alert and oriented to person, place, and time.      Motor: No weakness.   Psychiatric:         Mood and Affect: Mood normal.         Behavior: Behavior normal.         Thought Content: Thought content normal.         Judgment: Judgment normal.

## 2024-12-05 NOTE — PATIENT INSTRUCTIONS
"Patient Education     Routine physical for adults   The Basics   Written by the doctors and editors at [facility]   What is a physical? -- A physical is a routine visit, or \"check-up,\" with your doctor. You might also hear it called a \"wellness visit\" or \"preventive visit.\"  During each visit, the doctor will:   Ask about your physical and mental health   Ask about your habits, behaviors, and lifestyle   Do an exam   Give you vaccines if needed   Talk to you about any medicines you take   Give advice about your health   Answer your questions  Getting regular check-ups is an important part of taking care of your health. It can help your doctor find and treat any problems you have. But it's also important for preventing health problems.  A routine physical is different from a \"sick visit.\" A sick visit is when you see a doctor because of a health concern or problem. Since physicals are scheduled ahead of time, you can think about what you want to ask the doctor.  How often should I get a physical? -- It depends on your age and health. In general, for people age 21 years and older:   If you are younger than 50 years, you might be able to get a physical every 3 years.   If you are 50 years or older, your doctor might recommend a physical every year.  If you have an ongoing health condition, like diabetes or high blood pressure, your doctor will probably want to see you more often.  What happens during a physical? -- In general, each visit will include:   Physical exam - The doctor or nurse will check your height, weight, heart rate, and blood pressure. They will also look at your eyes and ears. They will ask about how you are feeling and whether you have any symptoms that bother you.   Medicines - It's a good idea to bring a list of all the medicines you take to each doctor visit. Your doctor will talk to you about your medicines and answer any questions. Tell them if you are having any side effects that bother you. You " "should also tell them if you are having trouble paying for any of your medicines.   Habits and behaviors - This includes:   Your diet   Your exercise habits   Whether you smoke, drink alcohol, or use drugs   Whether you are sexually active   Whether you feel safe at home  Your doctor will talk to you about things you can do to improve your health and lower your risk of health problems. They will also offer help and support. For example, if you want to quit smoking, they can give you advice and might prescribe medicines. If you want to improve your diet or get more physical activity, they can help you with this, too.   Lab tests, if needed - The tests you get will depend on your age and situation. For example, your doctor might want to check your:   Cholesterol   Blood sugar   Iron level   Vaccines - The recommended vaccines will depend on your age, health, and what vaccines you already had. Vaccines are very important because they can prevent certain serious or deadly infections.   Discussion of screening - \"Screening\" means checking for diseases or other health problems before they cause symptoms. Your doctor can recommend screening based on your age, risk, and preferences. This might include tests to check for:   Cancer, such as breast, prostate, cervical, ovarian, colorectal, prostate, lung, or skin cancer   Sexually transmitted infections, such as chlamydia and gonorrhea   Mental health conditions like depression and anxiety  Your doctor will talk to you about the different types of screening tests. They can help you decide which screenings to have. They can also explain what the results might mean.   Answering questions - The physical is a good time to ask the doctor or nurse questions about your health. If needed, they can refer you to other doctors or specialists, too.  Adults older than 65 years often need other care, too. As you get older, your doctor will talk to you about:   How to prevent falling at " home   Hearing or vision tests   Memory testing   How to take your medicines safely   Making sure that you have the help and support you need at home  All topics are updated as new evidence becomes available and our peer review process is complete.  This topic retrieved from [employer] on: May 02, 2024.  Topic 920837 Version 1.0  Release: 32.4.3 - C32.122  © 2024 UpToDate, Inc. and/or its affiliates. All rights reserved.  Consumer Information Use and Disclaimer   Disclaimer: This generalized information is a limited summary of diagnosis, treatment, and/or medication information. It is not meant to be comprehensive and should be used as a tool to help the user understand and/or assess potential diagnostic and treatment options. It does NOT include all information about conditions, treatments, medications, side effects, or risks that may apply to a specific patient. It is not intended to be medical advice or a substitute for the medical advice, diagnosis, or treatment of a health care provider based on the health care provider's examination and assessment of a patient's specific and unique circumstances. Patients must speak with a health care provider for complete information about their health, medical questions, and treatment options, including any risks or benefits regarding use of medications. This information does not endorse any treatments or medications as safe, effective, or approved for treating a specific patient. UpToDate, Inc. and its affiliates disclaim any warranty or liability relating to this information or the use thereof.The use of this information is governed by the Terms of Use, available at https://www.woltersEyeGate Pharmaceuticalsuwer.com/en/know/clinical-effectiveness-terms. 2024© UpToDate, Inc. and its affiliates and/or licensors. All rights reserved.  Copyright   © 2024 UpToDate, Inc. and/or its affiliates. All rights reserved.    Patient Education     Diet and health   The Basics   Written by the doctors and  "editors at [employer]   Why is it important to eat a healthy diet? -- It's important to eat a healthy diet because eating the right foods can keep you healthy now and later in life. It can lower the risk of problems like heart disease, diabetes, high blood pressure, and some types of cancer. It can also help you live longer and improve your quality of life.  What kind of diet is best? -- There is no 1 specific diet that experts recommend for everyone. People choose what foods to eat for many different reasons. These include personal preference, culture, Caodaism, allergies or intolerances, and nutritional goals. People also need to consider the cost and availability of different foods.  In general, experts recommend a diet that:   Includes lots of vegetables, fruits, beans, nuts, and whole grains   Limits red and processed meats, unhealthy fats, sugar, salt, and alcohol  What are dietary patterns? -- A dietary \"pattern\" means generally eating certain types of foods while limiting others. Some people need to follow a specific dietary pattern because of their health needs. For example, if you have high blood pressure, your doctor might recommend a diet low in salt.  If you are trying to improve your health in general, choosing a healthy dietary pattern can help. This does not have to mean being very strict about what you eat or avoid. The goal is to think about getting plenty of healthy foods while limiting less healthy foods.  Examples of dietary patterns include:   Mediterranean diet - This involves eating a lot of fruits, vegetables, nuts, and whole grains, and uses olive oil instead of other fats. It also includes some fish, poultry, and dairy products, but not a lot of red meat. Following this diet can help your overall health, and might even lower your risk of having a stroke.   Plant-based diets - These patterns focus on vegetables, fruits, grains, beans, and nuts. They limit or avoid food that comes from " "animals, such as meat and dairy. There are different types of plant-based diets, including vegetarian and vegan.   Low-fat diet - A low-fat diet involves limiting calories from fat. This might help some people keep weight off if that is their goal, but it does not have many other health benefits. If you choose to follow a low-fat diet, it is also important to focus on getting lots of whole grains, legumes, fruits, and vegetables. Limit refined grains and sugar.   Low-cholesterol diet - Cholesterol is found in foods with a lot of saturated fat, like red meat, butter, and cheese. A low-cholesterol diet focuses on limiting the amount of cholesterol that you eat. Limiting the cholesterol in your diet can also help lower the amount of unhealthy fats that you eat.  Which foods are especially healthy? -- Foods that are especially healthy include:   Fruits and vegetables - Eating a diet with lots of fruits and vegetables can help prevent heart disease and stroke. It might also help prevent certain types of cancer. Try to eat fruits and vegetables at each meal and also for snacks. If you don't have fresh fruits and vegetables available, you can eat frozen or canned ones instead. Doctors recommend eating at least 5 servings of fruits or vegetables each day.   Whole grains - Whole-grain foods include 100 percent whole-wheat bread, steel cut oats, and whole-grain pasta. These are healthier than foods made with \"refined\" grains, like white bread and white rice. Eating lots of whole grains instead of refined grains has been shown to help with weight control. It can also lower the risk of several health problems, including colon cancer, heart disease, and diabetes. Doctors recommend that most people try to eat 5 to 8 servings of whole-grain, high-fiber foods each day.   Foods with fiber - Eating foods with a lot of fiber can help prevent heart disease and stroke. If you have type 2 diabetes, it can also help control your blood " "sugar. Foods that have a lot of fiber include vegetables, fruits, beans, nuts, oatmeal, and whole-grain breads and cereals. You can tell how much fiber is in a food by reading the nutrition label (figure 1). Doctors recommend that most people eat about 25 to 34 grams of fiber each day.   Foods with calcium and vitamin D - Babies, children, and adults need calcium and vitamin D to help keep their bones strong. Adults also need calcium and vitamin D to help prevent osteoporosis. Osteoporosis is a condition that causes bones to get \"thin\" and break more easily than usual. Different foods and drinks have calcium and vitamin D in them (figure 2). People who don't get enough calcium and vitamin D in their diet might need to take a supplement. Doctors recommend that most people have 2 to 3 servings of foods with calcium and vitamin D each day.   Foods with protein - Protein helps your muscles and bones stay strong. Healthy foods with a lot of protein include chicken, fish, eggs, beans, nuts, and soy products. Red meat also has a lot of protein, but it also contains fats, which can be unhealthy. Doctors recommend that most people try to eat about 5 servings of protein each day.   Healthy fats - There are different types of fats. Some types of fats are better for your body than others. Healthy fats are \"monounsaturated\" or \"polyunsaturated\" fats. These are found in fatty fish, nuts and nut butters, and avocados. Use plant-based oils when cooking. Examples of these oils include olive, canola, safflower, sunflower, and corn oil. Eating foods with healthy fats, while avoiding or limiting foods with unhealthy fats, might lower the risk of heart disease.   Foods with folate - Folate is a vitamin that is important for pregnant people, since it helps prevent certain birth defects. It is also called \"folic acid.\" Anyone who could get pregnant should get at least 400 micrograms of folic acid daily, whether or not they are actively " "trying to get pregnant. Folate is found in many breakfast cereals, oranges, orange juice, and green leafy vegetables.  What foods should I avoid or limit? -- To eat a healthy diet, there are some things that you should avoid or limit. They include:   Unhealthy fats - \"Trans\" fats are especially unhealthy. They are found in margarines, many fast foods, and some store-bought baked goods. \"Saturated\" fats are found in animal products like meats, egg yolks, butter, cheese, and full-fat milk products. Unhealthy fats can raise your cholesterol level and increase your chance of getting heart disease.   Sugar - To have a healthy diet, it's important to limit or avoid added sugar, sweets, and refined grains. Refined grains are found in white bread, white rice, most pastas, and most packaged \"snack\" foods.  Avoiding sugar-sweetened beverages, like soda and sports drinks, can also help improve your health.  Avoid canned fruits in \"heavy\" syrup.   Red and processed meats - Studies have shown that eating a lot of red meat can increase your risk of certain health problems, including heart disease and cancer. You should limit the amount of red meat that you eat. This is also true for processed meats like sausage, hot dogs, and ledbetter.  Can I drink alcohol as part of a healthy diet? -- Not drinking alcohol at all is the healthiest choice. Regular drinking can raise a person's chances of getting liver disease and certain types of cancers. In females, even 1 drink a day can increase the risk of getting breast cancer.  If you do choose to drink, most doctors recommend limiting alcohol to no more than:   1 drink a day for females   2 drinks a day for males  The limits are different because, generally, the female body takes longer to break down alcohol.  How many calories do I need each day? -- Calories give your body energy. The number of calories that you need each day depends on your weight, height, age, sex, and how active you " "are.  Your doctor or nurse can tell you about how many calories you should eat each day. You can also work with a dietitian (nutrition expert) to learn more about your dietary needs and options.  What if I am having trouble improving my diet? -- It can be hard to change the way that you eat. Remember that even small changes can improve your health.  Here are some tips that might help:   Try to make fruits and vegetables part of every meal. If you don't have fresh fruits and vegetables, frozen or canned are good options. Look for products without added salt or sugar.   Keep a bowl of fruit out for snacking.   When you can, choose whole grains instead of refined grains. Choose chicken, fish, and beans instead of red meat and cheese.   Try to eat prepared and processed foods less often.   Try flavored seltzer or water instead of soda or juice.   When eating at fast food restaurants, look for healthier items, like broiled chicken or salad.  If you have questions about which foods you should or should not eat, ask your doctor, nurse, or dietitian. The right diet for you will depend, in part, on your health and any medical conditions you have.  All topics are updated as new evidence becomes available and our peer review process is complete.  This topic retrieved from [employer] on: Feb 28, 2024.  Topic 37256 Version 28.0  Release: 32.2.4 - C32.58  © 2024 UpToDate, Inc. and/or its affiliates. All rights reserved.  figure 1: Nutrition label - Fiber     This is an example of a nutrition label. To figure out how much fiber is in a food, look for the line that says \"Dietary Fiber.\" It's also important to look at the serving size. This food has 7 grams of fiber in each serving, and each serving is 1 cup.  Graphic 32740 Version 8.0  figure 2: Foods and drinks with calcium and vitamin D     Foods rich in calcium include ice cream, soy milk, breads, kale, broccoli, milk, cheese, cottage cheese, almonds, yogurt, ready-to-eat cereals, " "beans, and tofu. Foods rich in vitamin D include milk, fortified plant-based \"milks\" (soy, almond), canned tuna fish, cod liver oil, yogurt, ready-to-eat-cereals, cooked salmon, canned sardines, mackerel, and eggs. Some of these foods are rich in both.  Graphic 05805 Version 4.0  Consumer Information Use and Disclaimer   Disclaimer: This generalized information is a limited summary of diagnosis, treatment, and/or medication information. It is not meant to be comprehensive and should be used as a tool to help the user understand and/or assess potential diagnostic and treatment options. It does NOT include all information about conditions, treatments, medications, side effects, or risks that may apply to a specific patient. It is not intended to be medical advice or a substitute for the medical advice, diagnosis, or treatment of a health care provider based on the health care provider's examination and assessment of a patient's specific and unique circumstances. Patients must speak with a health care provider for complete information about their health, medical questions, and treatment options, including any risks or benefits regarding use of medications. This information does not endorse any treatments or medications as safe, effective, or approved for treating a specific patient. UpToDate, Inc. and its affiliates disclaim any warranty or liability relating to this information or the use thereof.The use of this information is governed by the Terms of Use, available at https://www.woltersAutoAlertuwer.com/en/know/clinical-effectiveness-terms. 2024© UpToDate, Inc. and its affiliates and/or licensors. All rights reserved.  Copyright   © 2024 UpToDate, Inc. and/or its affiliates. All rights reserved.    Patient Education     Quitting smoking   The Basics   Written by the doctors and editors at [employer]   What are the benefits of quitting smoking? -- Quitting smoking can dramatically improve your health and help you live longer. " "It lowers your risk of heart disease, lung disease, kidney failure, cancer, infection, stomach problems, and diabetes.  Quitting smoking can also lower your chances of getting osteoporosis, a condition that makes your bones weak.  Quitting is not easy for most people, and it might take several tries to completely quit. But help and support are available. Quitting smoking will improve your health no matter how old you are, even if you have smoked for a long time.  What should I do if I want to quit smoking? -- It's a good idea to start by talking with your doctor or nurse. It is possible to quit on your own, without help. But getting help greatly increases your chances of quitting successfully.  When you are ready to quit, you will make a plan to:   Set a quit date.   Tell your family and friends that you plan to quit.   Plan ahead for the challenges you will face, such as cigarette cravings.   Remove cigarettes from your home, car, and work.  How can my doctor or nurse help? -- Your doctor or nurse can give you advice on the best way to quit. They can also give you medicines to:   Reduce your craving for cigarettes   Reduce your \"withdrawal\" symptoms (symptoms that happen when you stop smoking)  Your doctor or nurse can also help you find a counselor to talk to. For most people who are trying to quit smoking, it works best to use both medicines and counseling.  You can also get help from a free phone line (1-287-QUITNOW, or 1-517.480.9290) or go online to www.smokefree.gov.  What are the symptoms of withdrawal? -- When you stop smoking, you might have symptoms such as:   Trouble sleeping   Feeling irritable, anxious, or restless   Getting frustrated or angry   Having trouble thinking clearly  These symptoms can be hard to deal with, which is why it can be so hard to quit. But medicines can help.  Some people who stop smoking become temporarily depressed. Some people need treatment for depression, such as counseling " or medicines or both. People with depression might:   No longer enjoy or care about doing the things they used to like to do   Feel sad, down, hopeless, nervous, or cranky most of the day, almost every day   Lose or gain weight   Sleep too much or too little   Feel tired or like they have no energy   Feel guilty or like they are worth nothing   Forget things or feel confused   Move and speak more slowly than usual   Act restless or have trouble staying still   Think about death or suicide  If you think you might be depressed, tell your doctor or nurse right away. They can talk to you about your symptoms and recommend treatment if needed.  Get help right away if you are thinking of hurting or killing yourself! -- Sometimes, people with depression think of hurting or killing themselves. If you ever feel like you might hurt yourself, help is available:   In the , contact the [employer] Suicide [facility] Crisis Lifeline:   To speak to someone, call or text [employer].   To talk to someone online, go to www.[employer].org/chat.   Call your doctor or nurse, and tell them that it is an emergency.   Call for an ambulance (in the [place] and [place], call 9-1-1).   Go to the emergency department at your local hospital.  If you think your partner might have depression, or if you are worried that they might hurt themselves, get them help right away.  How does counseling work? -- A counselor can help you figure out:   What triggers you to want to smoke, and how to handle these situations   How to resist cravings   What you can do differently if you have tried to quit before  You can meet with a counselor in 1-on-1 sessions or as part of a group. There are other ways to get counseling, too, such as over the phone, through text messaging, or online.  How do medicines help you stop smoking? -- Different medicines work in different ways:   Nicotine replacement therapy - Nicotine is the main drug in cigarettes, and the reason they are addictive. These  "medicines reduce your body's craving for nicotine. They also help with withdrawal symptoms.  There are different forms of nicotine replacement, including skin patches, lozenges, gum, nasal sprays, and inhalers. Most can be bought without a prescription. Also, health insurance might cover some or all of the cost.  It often helps to use 2 forms of nicotine replacement. For example, you might wear a patch all the time, plus use gum or lozenges when you get a craving to smoke.   Varenicline - Varenicline (brand names: Chantix, Champix) is a prescription medicine that reduces withdrawal symptoms and cigarette cravings. Varenicline can increase the effects of alcohol in some people. It's a good idea to limit drinking while you're taking it, at least until you know how it affects you.  Even if you are not yet ready to commit to a quit date, varenicline can help reduce cravings. This can make it easier to quit when you are ready.   Bupropion - Bupropion (sample brand names: Wellbutrin, Zyban) is a prescription medicine that reduces your desire to smoke. It is also available in a generic version, which is cheaper than the brand name medicines. Doctors do not usually prescribe bupropion for people with seizures or who have had seizures in the past.  It might also be helpful to combine nicotine replacement with bupropion or varenicline. In some cases, a person might even take both bupropion and varenicline. Your doctor or nurse can help you figure out the best combination for you.  Can vaping help me quit? -- Sometimes, people wonder if vaping can help them quit smoking. Vaping is using electronic cigarettes, or \"e-cigarettes.\"  Doctors recommend using medicines and counseling to quit smoking. These methods have been studied the most. But for people who have tried these and not been able to quit, switching to vaping might be an option. There are some things to remember:   Vaping might be less harmful than smoking regular " cigarettes. But e-cigarettes still contain nicotine as well as other substances that might be harmful.   If you decide to try vaping to help you quit, it's important to switch completely from regular cigarettes to e-cigarettes. Using both will probably not be helpful, and might increase the risks of harm.   It's not clear how vaping can affect a person's health in the long term.  For these reasons, doctors recommend that if you do try vaping to help you quit smoking, you still make a plan to quit vaping eventually.  If you are interested in trying vaping to help you quit smoking, it's a good idea to talk to your doctor or nurse first.  What if I am pregnant and I smoke? -- If you are pregnant, it's really important for the health of your baby that you quit. Ask your doctor what options you have, and what is safest for your baby.  What if I have already smoked for a long time? -- The longer you have smoked, the higher your chances are of having health problems. But it is never too late to quit smoking. It helps your health even if you are older or have smoked for many years. The best thing you can do to lower your chance of having a health problem caused by smoking is to quit.  Will I gain weight if I quit? -- You might gain a few pounds. This can be frustrating for some people. But it's important to remember that you are improving your health by quitting smoking. You can help prevent gaining a lot of weight by staying active and eating a healthy diet.  What if I am not able to quit? -- If you don't quit on your first try, or if you quit but then start smoking again, don't give up hope. Lots of people have to try more than once before they are able to completely quit.  It might help to try to understand why quitting did not work. There might be something you can do differently when you try again. It can help to figure out which situations make you want to smoke, so you can avoid them.  What else can I do to improve  my chances of quitting? -- You can:   Get regular exercise. Any type of physical activity, even gentle forms of movement, is good for your health. Physical activity can also help reduce stress.   Stay away from people who smoke and places that make you want to smoke. If people close to you smoke, ask them to quit with you or avoid smoking around you.   Carry gum, hard candy, or something to put in your mouth. If you get a craving for a cigarette, try 1 of these instead.   Don't give up, even if you start smoking again. It takes most people a few tries before they succeed.  All topics are updated as new evidence becomes available and our peer review process is complete.  This topic retrieved from [employer] on: Mar 14, 2024.  Topic 24577 Version 33.0  Release: 32.2.4 - C32.72  © 2024 [employer] and/or its affiliates. All rights reserved.  Consumer Information Use and Disclaimer   Disclaimer: This generalized information is a limited summary of diagnosis, treatment, and/or medication information. It is not meant to be comprehensive and should be used as a tool to help the user understand and/or assess potential diagnostic and treatment options. It does NOT include all information about conditions, treatments, medications, side effects, or risks that may apply to a specific patient. It is not intended to be medical advice or a substitute for the medical advice, diagnosis, or treatment of a health care provider based on the health care provider's examination and assessment of a patient's specific and unique circumstances. Patients must speak with a health care provider for complete information about their health, medical questions, and treatment options, including any risks or benefits regarding use of medications. This information does not endorse any treatments or medications as safe, effective, or approved for treating a specific patient. Up[employer], Inc[employer] and its affiliates disclaim any warranty or liability relating  to this information or the use thereof.The use of this information is governed by the Terms of Use, available at https://www.wolterskluwer.com/en/know/clinical-effectiveness-terms. 2024© UpToDate, Inc. and its affiliates and/or licensors. All rights reserved.  Copyright   © 2024 [employer] and/or its affiliates. All rights reserved.

## 2024-12-06 DIAGNOSIS — I50.9 ACUTE EXACERBATION OF CHF (CONGESTIVE HEART FAILURE) (HCC): ICD-10-CM

## 2024-12-06 DIAGNOSIS — I10 ESSENTIAL HYPERTENSION: Chronic | ICD-10-CM

## 2024-12-06 RX ORDER — LOSARTAN POTASSIUM 100 MG/1
100 TABLET ORAL DAILY
Qty: 90 TABLET | Refills: 1 | Status: SHIPPED | OUTPATIENT
Start: 2024-12-06

## 2024-12-06 RX ORDER — FUROSEMIDE 40 MG/1
40 TABLET ORAL DAILY
Qty: 90 TABLET | Refills: 1 | Status: SHIPPED | OUTPATIENT
Start: 2024-12-06

## 2024-12-06 RX ORDER — METOPROLOL SUCCINATE 50 MG/1
50 TABLET, EXTENDED RELEASE ORAL 2 TIMES DAILY
Qty: 180 TABLET | Refills: 1 | Status: SHIPPED | OUTPATIENT
Start: 2024-12-06

## 2024-12-09 LAB — FUNGUS SPEC CULT: NORMAL

## 2024-12-11 LAB
MYCOBACTERIUM SPEC CULT: NORMAL
RHODAMINE-AURAMINE STN SPEC: NORMAL

## 2024-12-16 LAB — FUNGUS SPEC CULT: NORMAL

## 2024-12-17 LAB
MYCOBACTERIUM SPEC CULT: NORMAL
RHODAMINE-AURAMINE STN SPEC: NORMAL

## 2024-12-23 LAB
MYCOBACTERIUM SPEC CULT: NORMAL
RHODAMINE-AURAMINE STN SPEC: NORMAL

## 2024-12-31 LAB
MYCOBACTERIUM SPEC CULT: NORMAL
RHODAMINE-AURAMINE STN SPEC: NORMAL

## 2025-01-04 PROBLEM — Z11.59 NEED FOR HEPATITIS C SCREENING TEST: Status: RESOLVED | Noted: 2024-12-05 | Resolved: 2025-01-04

## 2025-01-21 ENCOUNTER — OFFICE VISIT (OUTPATIENT)
Dept: URGENT CARE | Facility: CLINIC | Age: 45
End: 2025-01-21

## 2025-01-21 VITALS
OXYGEN SATURATION: 95 % | TEMPERATURE: 97.2 F | RESPIRATION RATE: 16 BRPM | BODY MASS INDEX: 38.5 KG/M2 | HEIGHT: 75 IN | SYSTOLIC BLOOD PRESSURE: 202 MMHG | DIASTOLIC BLOOD PRESSURE: 123 MMHG | HEART RATE: 97 BPM

## 2025-01-21 DIAGNOSIS — Z53.21 PATIENT LEFT WITHOUT BEING SEEN: Primary | Chronic | ICD-10-CM

## 2025-01-21 DIAGNOSIS — B34.9 VIRAL INFECTION: ICD-10-CM

## 2025-01-22 ENCOUNTER — OFFICE VISIT (OUTPATIENT)
Dept: FAMILY MEDICINE CLINIC | Facility: CLINIC | Age: 45
End: 2025-01-22
Payer: COMMERCIAL

## 2025-01-22 VITALS
BODY MASS INDEX: 38.3 KG/M2 | DIASTOLIC BLOOD PRESSURE: 96 MMHG | RESPIRATION RATE: 16 BRPM | WEIGHT: 308 LBS | SYSTOLIC BLOOD PRESSURE: 160 MMHG | OXYGEN SATURATION: 98 % | HEIGHT: 75 IN | HEART RATE: 85 BPM

## 2025-01-22 DIAGNOSIS — I10 UNCONTROLLED HYPERTENSION: Primary | ICD-10-CM

## 2025-01-22 DIAGNOSIS — I50.22 CHRONIC HFREF (HEART FAILURE WITH REDUCED EJECTION FRACTION) (HCC): Chronic | ICD-10-CM

## 2025-01-22 PROCEDURE — 99214 OFFICE O/P EST MOD 30 MIN: CPT | Performed by: FAMILY MEDICINE

## 2025-01-22 NOTE — ASSESSMENT & PLAN NOTE
Blood pressure continues to be elevated.  He is on Lasix 40, spironolactone 25, losartan 100, metoprolol XL 50 twice daily.  Advised to increase the dose of metoprolol to 50 in the morning and 100 at night.  And continue all the other medications as prescribed.  He has been advised to monitor his blood pressure at local pharmacy and bring the blood pressure log.  He has been given a follow-up with his PCP on 1/27/2025.

## 2025-01-22 NOTE — PROGRESS NOTES
Subjective:      Patient ID: Asif Kruse is a 44 y.o. male.    HPI    Patient is here, following up on his chronic medical problems.  Has uncontrolled hypertension, with chronic CHF.  Reviewing chart reveals poor compliance with his medical appointments.  Cardiac echo done in 2023 reveals EF 40%.  Last cardiology appointment 2023.  Was recently evaluated by PCP on 12/5/2024.  Patient had a cardiology appointment later today which he canceled due to his work schedule.  Medication wise he is currently on Lasix 40, losartan 100, metoprolol XL 50 twice daily, spironolactone 25 mg.  Noted to have elevated blood pressure.  Reports some headache but no dizziness or swelling in his legs.  Requesting a work excuse allowing him to work only day shifts.  Reports that his blood pressure gets uncontrolled during night shifts.  I will defer this to his PCP and advised  him to follow-up with his PCP for the note.    Past Medical History:   Diagnosis Date    Alcohol abuse, daily use     Chronic HFrEF (heart failure with reduced ejection fraction) ([facility]) 09/01/2023    Chronic kidney disease (CKD)     Hypertension     Tobacco abuse        Family History   Problem Relation Age of Onset    Heart disease Mother     Hypertension Mother     Heart failure Mother     Substance Abuse Mother     No Known Problems Father     No Known Problems Sister     No Known Problems Sister     Hypertension Brother     No Known Problems Maternal Grandmother     No Known Problems Maternal Grandfather     No Known Problems Paternal Grandmother     No Known Problems Paternal Grandfather     No Known Problems Daughter     Sudden death Neg Hx        Past Surgical History:   Procedure Laterality Date    BACK SURGERY      EXAMINATION UNDER ANESTHESIA N/A 11/15/2024    Procedure: EXAM UNDER ANESTHESIA (EUA), INTERNAL SPINCHEROTOMY, REMOVAL OF PERIANAL GLAND/PERIANAL LESION;  Surgeon: MARK Tesfaye MD;  Location: [facility];  Service: Colorectal    FINGER  "SURGERY Right     KNEE SURGERY Right     SHOULDER ARTHROPLASTY Right         reports that he has been smoking cigarettes. He has never used smokeless tobacco. He reports that he does not currently use alcohol after a past usage of about 56.0 standard drinks of alcohol per week. He reports current drug use. Drug: Marijuana.      Current Outpatient Medications:     furosemide (LASIX) 40 mg tablet, Take 1 tablet (40 mg total) by mouth daily, Disp: 90 tablet, Rfl: 1    losartan (COZAAR) 100 MG tablet, Take 1 tablet (100 mg total) by mouth daily, Disp: 90 tablet, Rfl: 1    metoprolol succinate (TOPROL-XL) 50 mg 24 hr tablet, Take 1 tablet (50 mg total) by mouth 2 (two) times a day, Disp: 180 tablet, Rfl: 1    nicotine (NICODERM CQ) 7 mg/24hr TD 24 hr patch, Place 1 patch on the skin over 24 hours every 24 hours, Disp: 28 patch, Rfl: 1    spironolactone (ALDACTONE) 25 mg tablet, Take 1 tablet (25 mg total) by mouth daily, Disp: 90 tablet, Rfl: 1    The following portions of the patient's history were reviewed and updated as appropriate: allergies, current medications, past family history, past medical history, past social history, past surgical history and problem list.    Review of Systems   Respiratory: Negative.     Cardiovascular: Negative.  Negative for leg swelling.           Objective:    /96   Pulse 85   Resp 16   Ht 6' 3\" (1.905 m)   Wt (!) 140 kg (308 lb)   SpO2 98%   BMI 38.50 kg/m²      Physical Exam  Constitutional:       Appearance: Normal appearance.   Cardiovascular:      Heart sounds: Normal heart sounds.   Pulmonary:      Breath sounds: Normal breath sounds.           No results found for this or any previous visit (from the past 6 weeks).    Assessment/Plan:    No problem-specific Assessment & Plan notes found for this encounter.           Problem List Items Addressed This Visit          Cardiovascular and Mediastinum    Chronic HFrEF (heart failure with reduced ejection fraction) (HCC) " (Chronic)    Wt Readings from Last 3 Encounters:   01/22/25 (!) 140 kg (308 lb)   12/05/24 (!) 140 kg (308 lb)   11/13/24 (!) 138 kg (305 lb)     Continue losartan, metoprolol, Lasix and spironolactone per PCP.  Cardiac echo is from 2023 with an EF of 40%.  Patient appears euvolemic.   Patient informed about the complications of uncontrolled hypertension and chronic congestive heart failure including heart attack/stroke and death.  Emphasized the importance of following up with cardiology for continued monitoring and management of his chronic medical problem.  Patient understands and reports that he will call the cardiology to make a follow-up appointment since he canceled his appointment today.                 Uncontrolled hypertension - Primary    Blood pressure continues to be elevated.  He is on Lasix 40, spironolactone 25, losartan 100, metoprolol XL 50 twice daily.  Advised to increase the dose of metoprolol to 50 in the morning and 100 at night.  And continue all the other medications as prescribed.  He has been advised to monitor his blood pressure at local pharmacy and bring the blood pressure log.  He has been given a follow-up with his PCP on 1/27/2025.

## 2025-01-22 NOTE — ASSESSMENT & PLAN NOTE
Wt Readings from Last 3 Encounters:   01/22/25 (!) 140 kg (308 lb)   12/05/24 (!) 140 kg (308 lb)   11/13/24 (!) 138 kg (305 lb)     Continue losartan, metoprolol, Lasix and spironolactone per PCP.  Cardiac echo is from 2023 with an EF of 40%.  Patient appears euvolemic.   Patient informed about the complications of uncontrolled hypertension and chronic congestive heart failure including heart attack/stroke and death.  Emphasized the importance of following up with cardiology for continued monitoring and management of his chronic medical problem.  Patient understands and reports that he will call the cardiology to make a follow-up appointment since he canceled his appointment today.

## 2025-05-06 ENCOUNTER — APPOINTMENT (OUTPATIENT)
Dept: LAB | Facility: CLINIC | Age: 45
End: 2025-05-06

## 2025-05-06 ENCOUNTER — APPOINTMENT (OUTPATIENT)
Dept: LAB | Facility: CLINIC | Age: 45
End: 2025-05-06
Attending: PATHOLOGY

## 2025-05-06 DIAGNOSIS — Z00.6 ENCOUNTER FOR EXAMINATION FOR NORMAL COMPARISON OR CONTROL IN CLINICAL RESEARCH PROGRAM: ICD-10-CM

## 2025-05-06 DIAGNOSIS — I50.22 CHRONIC HFREF (HEART FAILURE WITH REDUCED EJECTION FRACTION) (HCC): Chronic | ICD-10-CM

## 2025-05-06 DIAGNOSIS — Z12.5 ENCOUNTER FOR SCREENING PROSTATE SPECIFIC ANTIGEN (PSA) MEASUREMENT: ICD-10-CM

## 2025-05-06 DIAGNOSIS — Z11.59 NEED FOR HEPATITIS C SCREENING TEST: ICD-10-CM

## 2025-05-06 LAB
CHOLEST SERPL-MCNC: 207 MG/DL (ref ?–200)
EST. AVERAGE GLUCOSE BLD GHB EST-MCNC: 100 MG/DL
HBA1C MFR BLD: 5.1 %
HDLC SERPL-MCNC: 53 MG/DL
LDLC SERPL CALC-MCNC: 119 MG/DL (ref 0–100)
NONHDLC SERPL-MCNC: 154 MG/DL
PSA SERPL-MCNC: 0.36 NG/ML (ref 0–4)
TRIGL SERPL-MCNC: 176 MG/DL (ref ?–150)
TSH SERPL DL<=0.05 MIU/L-ACNC: 1.5 UIU/ML (ref 0.45–4.5)

## 2025-05-06 PROCEDURE — G0103 PSA SCREENING: HCPCS

## 2025-05-06 PROCEDURE — 84443 ASSAY THYROID STIM HORMONE: CPT

## 2025-05-06 PROCEDURE — 86803 HEPATITIS C AB TEST: CPT

## 2025-05-06 PROCEDURE — 83036 HEMOGLOBIN GLYCOSYLATED A1C: CPT

## 2025-05-06 PROCEDURE — 80061 LIPID PANEL: CPT

## 2025-05-06 PROCEDURE — 36415 COLL VENOUS BLD VENIPUNCTURE: CPT

## 2025-05-07 ENCOUNTER — RESULTS FOLLOW-UP (OUTPATIENT)
Dept: FAMILY MEDICINE CLINIC | Facility: CLINIC | Age: 45
End: 2025-05-07

## 2025-05-07 LAB — HCV AB SER QL: REACTIVE

## 2025-05-08 DIAGNOSIS — Z11.59 NEED FOR HEPATITIS C SCREENING TEST: Primary | ICD-10-CM

## 2025-05-08 DIAGNOSIS — R76.8 HEPATITIS C ANTIBODY TEST POSITIVE: ICD-10-CM

## 2025-05-19 LAB
APOB+LDLR+PCSK9 GENE MUT ANL BLD/T: NOT DETECTED
BRCA1+BRCA2 DEL+DUP + FULL MUT ANL BLD/T: NOT DETECTED
MLH1+MSH2+MSH6+PMS2 GN DEL+DUP+FUL M: NOT DETECTED

## 2025-06-05 DIAGNOSIS — I50.9 ACUTE EXACERBATION OF CHF (CONGESTIVE HEART FAILURE) (HCC): ICD-10-CM

## 2025-06-05 DIAGNOSIS — I10 ESSENTIAL HYPERTENSION: Chronic | ICD-10-CM

## 2025-06-06 RX ORDER — LOSARTAN POTASSIUM 100 MG/1
100 TABLET ORAL DAILY
Qty: 90 TABLET | Refills: 1 | Status: SHIPPED | OUTPATIENT
Start: 2025-06-06

## 2025-06-06 RX ORDER — FUROSEMIDE 40 MG/1
40 TABLET ORAL DAILY
Qty: 90 TABLET | Refills: 1 | Status: SHIPPED | OUTPATIENT
Start: 2025-06-06

## 2025-06-06 RX ORDER — METOPROLOL SUCCINATE 50 MG/1
50 TABLET, EXTENDED RELEASE ORAL 2 TIMES DAILY
Qty: 180 TABLET | Refills: 1 | Status: SHIPPED | OUTPATIENT
Start: 2025-06-06

## (undated) DEVICE — TUBING SUCTION 5MM X 12 FT

## (undated) DEVICE — GLOVE SRG BIOGEL 7.5

## (undated) DEVICE — SUT CHROMIC 2-0 CT-2 27 IN 883H

## (undated) DEVICE — SPONGE STICK WITH PVP-I: Brand: KENDALL

## (undated) DEVICE — ABDOMINAL PAD: Brand: DERMACEA

## (undated) DEVICE — BASIC SINGLE BASIN 2-LF: Brand: MEDLINE INDUSTRIES, INC.

## (undated) DEVICE — PLUMEPEN PRO 10FT

## (undated) DEVICE — 3M™ DURAPORE™ SURGICAL TAPE 1538-3, 3 INCH X 10 YARD (7,5CM X 9,1M), 4 ROLLS/BOX: Brand: 3M™ DURAPORE™

## (undated) DEVICE — TINCTURE OF BENZOIN SKIN PREP SPRAY IS A SKIN PREP SPRAY THAT ENHANCES THE ADHESION OF TAPE/APPLIANCE WHILE PROTECTING SENSITIVE SKIN FROM ADHESIVES AND BODY FLUIDS.: Brand: TINCTURE OF BENZOIN SPRAY 4OZ US

## (undated) DEVICE — GLOVE INDICATOR PI UNDERGLOVE SZ 8 BLUE

## (undated) DEVICE — BETHLEHEM UNIVERSAL MINOR GEN: Brand: CARDINAL HEALTH

## (undated) DEVICE — STERILE LUBRICATING JELLY, TUBE: Brand: HR LUBRICATING JELLY

## (undated) DEVICE — POOLE SUCTION HANDLE: Brand: CARDINAL HEALTH

## (undated) DEVICE — INTENDED FOR TISSUE SEPARATION, AND OTHER PROCEDURES THAT REQUIRE A SHARP SURGICAL BLADE TO PUNCTURE OR CUT.: Brand: BARD-PARKER SAFETY BLADES SIZE 10, STERILE

## (undated) DEVICE — GAUZE SPONGES,16 PLY: Brand: CURITY

## (undated) DEVICE — DECANTER: Brand: UNBRANDED